# Patient Record
Sex: MALE | Race: BLACK OR AFRICAN AMERICAN | Employment: OTHER | URBAN - METROPOLITAN AREA
[De-identification: names, ages, dates, MRNs, and addresses within clinical notes are randomized per-mention and may not be internally consistent; named-entity substitution may affect disease eponyms.]

---

## 2017-05-30 ENCOUNTER — HOSPITAL ENCOUNTER (OUTPATIENT)
Dept: LAB | Age: 77
Discharge: HOME | End: 2017-05-30
Payer: MEDICARE

## 2017-05-30 ENCOUNTER — OFFICE VISIT (OUTPATIENT)
Dept: INTERNAL MEDICINE CLINIC | Age: 77
End: 2017-05-30

## 2017-05-30 VITALS
HEART RATE: 70 BPM | SYSTOLIC BLOOD PRESSURE: 142 MMHG | DIASTOLIC BLOOD PRESSURE: 70 MMHG | RESPIRATION RATE: 16 BRPM | HEIGHT: 71 IN | WEIGHT: 185.8 LBS | TEMPERATURE: 97.7 F | BODY MASS INDEX: 26.01 KG/M2 | OXYGEN SATURATION: 95 %

## 2017-05-30 DIAGNOSIS — J45.30 MILD PERSISTENT ASTHMA WITHOUT COMPLICATION: Primary | ICD-10-CM

## 2017-05-30 DIAGNOSIS — Z13.31 SCREENING FOR DEPRESSION: ICD-10-CM

## 2017-05-30 DIAGNOSIS — Z13.39 SCREENING FOR ALCOHOLISM: ICD-10-CM

## 2017-05-30 DIAGNOSIS — Z00.00 ROUTINE GENERAL MEDICAL EXAMINATION AT A HEALTH CARE FACILITY: ICD-10-CM

## 2017-05-30 DIAGNOSIS — I10 ESSENTIAL HYPERTENSION: ICD-10-CM

## 2017-05-30 DIAGNOSIS — Z86.010 PERSONAL HISTORY OF COLONIC POLYPS: ICD-10-CM

## 2017-05-30 DIAGNOSIS — R73.01 IFG (IMPAIRED FASTING GLUCOSE): ICD-10-CM

## 2017-05-30 DIAGNOSIS — E78.5 DYSLIPIDEMIA: ICD-10-CM

## 2017-05-30 PROCEDURE — 83036 HEMOGLOBIN GLYCOSYLATED A1C: CPT

## 2017-05-30 PROCEDURE — 36415 COLL VENOUS BLD VENIPUNCTURE: CPT

## 2017-05-30 PROCEDURE — 80061 LIPID PANEL: CPT

## 2017-05-30 PROCEDURE — 80053 COMPREHEN METABOLIC PANEL: CPT

## 2017-05-30 NOTE — MR AVS SNAPSHOT
Visit Information Date & Time Provider Department Dept. Phone Encounter #  
 5/30/2017  8:30 AM Francisco Leiva MD [facility] [phone] [id number] Follow-up Instructions Return in about 6 months (around 11/30/2017) for bp, chol, ifg, asthma. Upcoming Health Maintenance Date Due  
 MEDICARE YEARLY EXAM 5/26/2017 INFLUENZA AGE 9 TO ADULT 8/1/2017 GLAUCOMA SCREENING Q2Y 8/28/2017 COLONOSCOPY 2/18/2021 DTaP/Tdap/Td series (2 - Td) 5/25/2026 Allergies as of 5/30/2017  Review Complete On: 5/30/2017 By: Yamilka Pereyra LPN Severity Noted Reaction Type Reactions Melon Flavor  07/29/2010    Swelling Facial swelling with watermelon and cantelope Current Immunizations  Reviewed on 5/30/2017 Name Date Influenza High Dose Vaccine PF 11/28/2016, 11/23/2015, 11/10/2014 Influenza Vaccine 11/8/2013 Influenza Vaccine Split 11/9/2012  9:13 AM, 10/7/2011 Influenza Vaccine Whole 10/13/2010 Pneumococcal Conjugate (PCV-13) 3/9/2015 Pneumococcal Vaccine (Unspecified Type) 3/9/2015, 1/1/2002, 1/1/1999 TD Vaccine 1/1/1999 Reviewed by Francisco Leiva MD on 5/30/2017 at  8:38 AM  
You Were Diagnosed With   
  
 Codes Comments Mild persistent asthma without complication    -  Primary ICD-10-CM: J45.30 ICD-9-CM: 493.90 Personal history of colonic polyps     ICD-10-CM: Z86.010 
ICD-9-CM: V12.72 Essential hypertension     ICD-10-CM: I10 
ICD-9-CM: 401.9 IFG (impaired fasting glucose)     ICD-10-CM: R73.01 
ICD-9-CM: 790.21 Dyslipidemia     ICD-10-CM: E78.5 ICD-9-CM: 272.4 Routine general medical examination at a health care facility     ICD-10-CM: Z00.00 ICD-9-CM: V70.0 Screening for alcoholism     ICD-10-CM: Z13.89 ICD-9-CM: V79.1 Screening for depression     ICD-10-CM: Z13.89 ICD-9-CM: V79.0 Vitals BP Pulse Temp Resp Height(growth percentile) Weight(growth percentile) 142/70 70 97.7 °F (36.5 °C) (Oral) 16 5' 11\" (1.803 m) 185 lb 12.8 oz (84.3 kg) SpO2 BMI Smoking Status 95% 25.91 kg/m2 Former Smoker Vitals History BMI and BSA Data Body Mass Index Body Surface Area  
 25.91 kg/m 2 2.05 m 2 Preferred Pharmacy Pharmacy Name Phone [facility] [phone] Your Updated Medication List  
  
   
This list is accurate as of: 5/30/17  8:56 AM.  Always use your most recent med list.  
  
  
  
  
 ASMANEX TWISTHALER 220 mcg (60 doses) inhaler Generic drug:  mometasone Inhale 2 puffs by mouth  every day  
  
 finasteride 5 mg tablet Commonly known as:  PROSCAR Take 5 mg by mouth daily. MULTIVITAMIN PO Take  by mouth daily. pantoprazole 40 mg tablet Commonly known as:  PROTONIX Take 1 Tab by mouth daily. psyllium Powd Commonly known as:  METAMUCIL Take  by mouth daily. tamsulosin 0.4 mg capsule Commonly known as:  FLOMAX Take 0.4 mg by mouth nightly. VITAMIN D3 1,000 unit tablet Generic drug:  cholecalciferol Take  by mouth daily. ZyrTEC 10 mg Cap Generic drug:  Cetirizine Take 10 mg by mouth daily. We Performed the Following [facility] 68 [[id number] [facility]] HEMOGLOBIN A1C WITH EAG [63130 CPT(R)] LIPID PANEL [28217 CPT(R)] METABOLIC PANEL, COMPREHENSIVE [30873 CPT(R)] Follow-up Instructions Return in about 6 months (around 11/30/2017) for bp, chol, ifg, asthma. Patient Instructions Medicare Wellness Visit, Male The best way to live healthy is to have a healthy lifestyle by eating a well-balanced diet, exercising regularly, limiting alcohol and stopping smoking. Regular physical exams and screening tests are another way to keep healthy.   
Preventive exams provided by your health care provider can find health problems before they become diseases or illnesses. Preventive services including immunizations, screening tests, monitoring and exams can help you take care of your own health. All people over age 72 should have a pneumovax  and and a prevnar shot to prevent pneumonia. These are once in a lifetime unless you and your provider decide differently. All people over 65 should have a yearly flu shot and a tetanus vaccine every 10 years. Screening for diabetes mellitus with a blood sugar test should be done every year. Glaucoma is a disease of the eye due to increased ocular pressure that can lead to blindness and it should be done every year by an eye professional. 
 
Cardiovascular screening tests that check for elevated lipids (fatty part of blood) which can lead to heart disease and strokes should be done every 5 years. Colorectal screening that evaluates for blood or polyps in your colon should be done yearly as a stool test or every five years as a flexible sigmoidoscope or every 10 years as a colonoscopy up to age 76. Men up to age 76 may need a screening blood test for prostate cancer at certain intervals, depending on their personal and family history. This decision is between the patient and his provider. If you have been a smoker or had family history of abdominal aortic aneurysms, you and your provider may decide to schedule an ultrasound test of your aorta. Hepatitis C screening is also recommended for anyone born between [birth year] through [address]. A shingles vaccine is also recommended once in a lifetime after age 61. Your Medicare Wellness Exam is recommended annually. Here is a list of your current Health Maintenance items with a due date: 
Health Maintenance Due Topic Date Due  
 Annual Well Visit  05/26/2017 Introducing [facility]!    
 Francisco Caceres introduces [employer] patient portal. Now you can access parts of your medical record, email your doctor's office, and request medication refills online. 1. In your internet browser, go to [email or web address] 2. Click on the First Time User? Click Here link in the Sign In box. You will see the New Member Sign Up page. 3. Enter your [employer] Access Code exactly as it appears below. You will not need to use this code after youve completed the sign-up process. If you do not sign up before the expiration date, you must request a new code. · [employer] Access Code: [email or web address] Expires: 8/28/2017  8:56 AM 
 
4. Enter the last four digits of your Social Security Number (xxxx) and Date of Birth (mm/dd/yyyy) as indicated and click Submit. You will be taken to the next sign-up page. 5. Create a [employer] ID. This will be your [employer] login ID and cannot be changed, so think of one that is secure and easy to remember. 6. Create a [employer] password. You can change your password at any time. 7. Enter your Password Reset Question and Answer. This can be used at a later time if you forget your password. 8. Enter your e-mail address. You will receive e-mail notification when new information is available in [address] 9. Click Sign Up. You can now view and download portions of your medical record. 10. Click the Download Summary menu link to download a portable copy of your medical information. If you have questions, please visit the Frequently Asked Questions section of the [employer] website. Remember, [employer] is NOT to be used for urgent needs. For medical emergencies, dial 911. Now available from your iPhone and Android! Please provide this summary of care documentation to your next provider. Your primary care clinician is listed as Felice Manning. If you have any questions after today's visit, please call [phone].

## 2017-05-30 NOTE — PROGRESS NOTES
Patient received paperwork for advance directive during previous visit but has not completed at this time     Reviewed record In preparation for visit and have obtained necessary documentation      1. Have you been to the ER, urgent care clinic since your last visit? Hospitalized since your last visit? NO    2. Have you seen or consulted any other health care providers outside of the [address] [place] [address] since your last visit? Include any pap smears or colon screening.  NO

## 2017-05-30 NOTE — PROGRESS NOTES
HPI  Mr. Jovanna See is a 78y.o. year old male, he is seen today for HTN, high cholesterol, IFG, asthma, AWV. Still walking/light jogging about 2 miles 5 days a week. Asthma overall well controlled, now only uses albuterol rarely, and asmanex consistently if he has more cough/wheezing - not needing now. No cp or sob, gerd pretty well controlled with protonix, no dyspepsia or abdominal pain. +frequent belching and h/o hiatal hernia. No dizziness, lightheadedness, no melena or brbpr. No edema. BPH controlled with proscar and flomax - will see urology next month. Doesn't check BP much. Has occasional right sided neck pain if he moves his head - worries about carotid artery disease as his friend recently had surgery. Chief Complaint   Patient presents with    Annual Wellness Visit     Room 2// Fasting     Blood Pressure Check    Cholesterol Problem    Asthma        Prior to Admission medications    Medication Sig Start Date End Date Taking? Authorizing Provider   finasteride (PROSCAR) 5 mg tablet Take 5 mg by mouth daily. Yes Historical Provider   pantoprazole (PROTONIX) 40 mg tablet Take 1 Tab by mouth daily. 11/28/16  Yes Cherise Gaxiola MD   cholecalciferol (VITAMIN D3) 1,000 unit tablet Take  by mouth daily. Yes Historical Provider   tamsulosin (FLOMAX) 0.4 mg capsule Take 0.4 mg by mouth nightly. Yes Historical Provider   psyllium (METAMUCIL) powd Take  by mouth daily. Yes Historical Provider   [facility] TWISTHALER 220 mcg (60 doses) inhaler Inhale 2 puffs by mouth  every day  Patient taking differently: Inhale 2 puffs by mouth  every day prn 10/19/15  Yes Cherise Gaxiola MD   Cetirizine (ZYRTEC) 10 mg cap Take 10 mg by mouth daily. Yes Historical Provider   MULTIVITAMINS (MULTIVITAMIN PO) Take  by mouth daily.  7/22/10  Yes Historical Provider         Allergies   Allergen Reactions    Melon Flavor Swelling     Facial swelling with watermelon and cantelope         REVIEW OF SYSTEMS:  Per HPI    PHYSICAL EXAM:  Visit Vitals    /70    Pulse 70    Temp 97.7 °F (36.5 °C) (Oral)    Resp 16    Ht 5' 11\" (1.803 m)    Wt 185 lb 12.8 oz (84.3 kg)    SpO2 95%    BMI 25.91 kg/m2     Constitutional: Appears well-developed and well-nourished. No distress. HENT:   Head: Normocephalic and atraumatic. Eyes: No scleral icterus. Cardiovascular: Normal S1/S2, regular rhythm. No murmurs, rubs, or gallops. Pulmonary/Chest: Effort normal and breath sounds normal. No respiratory distress. No wheezes, rhonchi, or rales. Ext: No edema. Neurological: Alert. Psychiatric: Normal mood and affect. Behavior is normal.     Lab Results   Component Value Date/Time    Sodium 141 11/28/2016 08:57 AM    Potassium 4.5 11/28/2016 08:57 AM    Chloride 100 11/28/2016 08:57 AM    CO2 27 11/28/2016 08:57 AM    Anion gap 8 10/24/2015 09:05 PM    Glucose 100 11/28/2016 08:57 AM    BUN 13 11/28/2016 08:57 AM    Creatinine 0.88 11/28/2016 08:57 AM    BUN/Creatinine ratio 15 11/28/2016 08:57 AM    GFR est AA 98 11/28/2016 08:57 AM    GFR est non-AA 85 11/28/2016 08:57 AM    Calcium 9.2 11/28/2016 08:57 AM    Bilirubin, total 0.4 11/28/2016 08:57 AM    AST (SGOT) 21 11/28/2016 08:57 AM    Alk.  phosphatase 53 11/28/2016 08:57 AM    Protein, total 6.5 11/28/2016 08:57 AM    Albumin 4.5 11/28/2016 08:57 AM    Globulin 2.6 10/24/2015 09:05 PM    A-G Ratio 2.3 11/28/2016 08:57 AM    ALT (SGPT) 21 11/28/2016 08:57 AM     Lab Results   Component Value Date/Time    Hemoglobin A1c 6.1 11/28/2016 08:57 AM    Hemoglobin A1c 6.0 05/25/2016 09:05 AM    Hemoglobin A1c 6.0 11/12/2015 01:34 PM      Lab Results   Component Value Date/Time    Cholesterol, total 186 11/28/2016 08:57 AM    HDL Cholesterol 53 11/28/2016 08:57 AM    LDL, calculated 114 11/28/2016 08:57 AM    VLDL, calculated 19 11/28/2016 08:57 AM    Triglyceride 94 11/28/2016 08:57 AM    CHOL/HDL Ratio 3.5 07/29/2010 09:06 AM ASSESSMENT/PLAN  Earl House was seen today for annual wellness visit, blood pressure check, cholesterol problem and asthma. Diagnoses and all orders for this visit:    Mild persistent asthma without complication  Controlled - continue current medications - only uses asmanex certain times of the year  Personal history of colonic polyps  Up to date on colonoscopy  Essential hypertension  Off medications - controlled for age - normally lower at home  IFG (impaired fasting glucose)  -     HEMOGLOBIN A1C WITH EAG  Encouraged continued walking  Dyslipidemia  -     LIPID PANEL  -     METABOLIC PANEL, COMPREHENSIVE  On statin - check labs  Routine general medical examination at a health care facility    Screening for alcoholism    Screening for depression  -     Depression Screen Annual          There are no preventive care reminders to display for this patient. Follow-up Disposition:  Return in about 6 months (around 11/30/2017) for bp, chol, ifg, asthma. Reviewed plan of care. Patient has provided input and agrees with goals. The nurse provided the patient and/or family with advanced directive information if needed and encouraged the patient to provide a copy to the office when available. This is a Subsequent Medicare Annual Wellness Visit providing Personalized Prevention Plan Services (PPPS) (Performed 12 months after initial AWV and PPPS )    I have reviewed the patient's medical history in detail and updated the computerized patient record.      History     Past Medical History:   Diagnosis Date    Asthma     BPH     Elevated PSA     Peyronie disease       Past Surgical History:   Procedure Laterality Date    ABDOMEN SURGERY PROC UNLISTED      R inguinal hernia repair    HX CYST REMOVAL      cyst removal back    HX GI      colon polyp 1998; colonoscopy - needs every 5 yrs - 3/09 (-); repeat 3/2014 - Dr. Ange TANG ear surgery - tympanomastoidectomy (revision 1st surgery 1973 fo \"hole in TM\"; s\p choleostoma; b/l cataract surgery - [facility] at [facility]      Current Outpatient Prescriptions   Medication Sig Dispense Refill    finasteride (PROSCAR) 5 mg tablet Take 5 mg by mouth daily.  pantoprazole (PROTONIX) 40 mg tablet Take 1 Tab by mouth daily. 90 Tab 4    cholecalciferol (VITAMIN D3) 1,000 unit tablet Take  by mouth daily.  tamsulosin (FLOMAX) 0.4 mg capsule Take 0.4 mg by mouth nightly.  psyllium (METAMUCIL) powd Take  by mouth daily.  ASMANEX TWISTHALER 220 mcg (60 doses) inhaler Inhale 2 puffs by mouth  every day (Patient taking differently: Inhale 2 puffs by mouth  every day prn) 3 Inhaler 4    Cetirizine (ZYRTEC) 10 mg cap Take 10 mg by mouth daily.  MULTIVITAMINS (MULTIVITAMIN PO) Take  by mouth daily.        Allergies   Allergen Reactions    Melon Flavor Swelling     Facial swelling with watermelon and cantelope     Family History   Problem Relation Age of Onset    Cancer Mother      gall baldder    Kidney Disease Sister      Social History   Substance Use Topics    Smoking status: Former Smoker     Quit date: 1/1/1970    Smokeless tobacco: Never Used      Comment: started age [age] - <1ppd stopped 1970    Alcohol use Yes     Patient Active Problem List   Diagnosis Code    GERD (gastroesophageal reflux disease) K21.9    BPH (benign prostatic hyperplasia) N40.0    Asthma J45.909    Dyslipidemia E78.5    Personal history of colonic polyps Z86.010    Degenerative arthritis of lumbar spine M47.816    H. 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 No    Do you average more than 14 drinks per week? No      Functional Ability and Level of Safety:     Hearing Loss   mild    Activities of Daily Living   Self-care. Requires assistance with: no ADLs    Fall Risk     Fall Risk Assessment, last 12 mths 5/30/2017   Able to walk? Yes   Fall in past 12 months? No   Fall with injury? -     Abuse Screen   Patient is not abused    Review of Systems   Pertinent items are noted in HPI. Physical Examination     Evaluation of Cognitive Function:  Mood/affect:  happy  Appearance: age appropriate  Family member/caregiver input: n/a        Patient Care Team:  Mark Mahmood MD as PCP - Gerardo Mendoza MD (Cardiology)  Godwin Wilson MD as Physician (Ophthalmology)    Advice/Referrals/Counseling   Education and counseling provided:  Are appropriate based on today's review and evaluation      Assessment/Plan   Ruben Olvera was seen today for annual wellness visit, blood pressure check, cholesterol problem and asthma. Diagnoses and all orders for this visit:    Mild persistent asthma without complication    Personal history of colonic polyps    Essential hypertension    IFG (impaired fasting glucose)  -     HEMOGLOBIN A1C WITH EAG    Dyslipidemia  -     LIPID PANEL  -     METABOLIC PANEL, COMPREHENSIVE    Routine general medical examination at a health care facility    Screening for alcoholism    Screening for depression  -     Depression Screen Annual      Follow-up Disposition:  Return in about 6 months (around 11/30/2017) for bp, chol, ifg, asthma. Bradley Red

## 2017-05-30 NOTE — PATIENT INSTRUCTIONS

## 2017-05-30 NOTE — ACP (ADVANCE CARE PLANNING)
Advance Care Planning (ACP) Provider Conversation Snapshot    Date of ACP Conversation: 05/30/17  Persons included in Conversation:  patient   Length of ACP Conversation in minutes:  <16 minutes (Non-Billable)    Authorized Decision Maker (if patient is incapable of making informed decisions): This person is:   wife Aisha Colbert          For Patients with Decision Making Capacity:    Values/Goals: Exploration of values, goals, and preferences if recovery is not expected, even with continued medical treatment in the event of:  Imminent death  Severe, permanent brain injury  \"In these circumstances, what matters most to you? \"  Care focused more on comfort or quality of life.     Conversation Outcomes / Follow-Up Plan:   Recommended completion of Advance Directive form after review of ACP materials and conversation with prospective healthcare agent

## 2017-05-31 LAB
ALBUMIN SERPL-MCNC: 4.3 G/DL (ref 3.5–4.8)
ALBUMIN/GLOB SERPL: 2 {RATIO} (ref 1.2–2.2)
ALP SERPL-CCNC: 59 IU/L (ref 39–117)
ALT SERPL-CCNC: 25 IU/L (ref 0–44)
AST SERPL-CCNC: 23 IU/L (ref 0–40)
BILIRUB SERPL-MCNC: 0.4 MG/DL (ref 0–1.2)
BUN SERPL-MCNC: 11 MG/DL (ref 8–27)
BUN/CREAT SERPL: 13 (ref 10–24)
CALCIUM SERPL-MCNC: 9.3 MG/DL (ref 8.6–10.2)
CHLORIDE SERPL-SCNC: 101 MMOL/L (ref 96–106)
CHOLEST SERPL-MCNC: 180 MG/DL (ref 100–199)
CO2 SERPL-SCNC: 29 MMOL/L (ref 18–29)
CREAT SERPL-MCNC: 0.84 MG/DL (ref 0.76–1.27)
EST. AVERAGE GLUCOSE BLD GHB EST-MCNC: 131 MG/DL
GLOBULIN SER CALC-MCNC: 2.2 G/DL (ref 1.5–4.5)
GLUCOSE SERPL-MCNC: 99 MG/DL (ref 65–99)
HBA1C MFR BLD: 6.2 % (ref 4.8–5.6)
HDLC SERPL-MCNC: 52 MG/DL
INTERPRETATION, 910389: NORMAL
LDLC SERPL CALC-MCNC: 107 MG/DL (ref 0–99)
POTASSIUM SERPL-SCNC: 4.5 MMOL/L (ref 3.5–5.2)
PROT SERPL-MCNC: 6.5 G/DL (ref 6–8.5)
SODIUM SERPL-SCNC: 142 MMOL/L (ref 134–144)
TRIGL SERPL-MCNC: 106 MG/DL (ref 0–149)
VLDLC SERPL CALC-MCNC: 21 MG/DL (ref 5–40)

## 2017-11-30 ENCOUNTER — HOSPITAL ENCOUNTER (OUTPATIENT)
Dept: LAB | Age: 77
Discharge: HOME | End: 2017-11-30
Payer: MEDICARE

## 2017-11-30 ENCOUNTER — OFFICE VISIT (OUTPATIENT)
Dept: INTERNAL MEDICINE CLINIC | Age: 77
End: 2017-11-30

## 2017-11-30 VITALS
RESPIRATION RATE: 14 BRPM | HEART RATE: 75 BPM | OXYGEN SATURATION: 96 % | WEIGHT: 179.4 LBS | SYSTOLIC BLOOD PRESSURE: 134 MMHG | TEMPERATURE: 97.9 F | HEIGHT: 71 IN | BODY MASS INDEX: 25.11 KG/M2 | DIASTOLIC BLOOD PRESSURE: 74 MMHG

## 2017-11-30 DIAGNOSIS — M47.816 OSTEOARTHRITIS OF LUMBAR SPINE, UNSPECIFIED SPINAL OSTEOARTHRITIS COMPLICATION STATUS: ICD-10-CM

## 2017-11-30 DIAGNOSIS — Z23 ENCOUNTER FOR IMMUNIZATION: ICD-10-CM

## 2017-11-30 DIAGNOSIS — N40.0 BENIGN PROSTATIC HYPERPLASIA, UNSPECIFIED WHETHER LOWER URINARY TRACT SYMPTOMS PRESENT: ICD-10-CM

## 2017-11-30 DIAGNOSIS — K21.00 GASTROESOPHAGEAL REFLUX DISEASE WITH ESOPHAGITIS: ICD-10-CM

## 2017-11-30 DIAGNOSIS — R73.01 IFG (IMPAIRED FASTING GLUCOSE): ICD-10-CM

## 2017-11-30 DIAGNOSIS — J45.40 MODERATE PERSISTENT ASTHMA WITHOUT COMPLICATION: ICD-10-CM

## 2017-11-30 DIAGNOSIS — T14.8XXA MUSCLE STRAIN: ICD-10-CM

## 2017-11-30 DIAGNOSIS — M54.50 BILATERAL LOW BACK PAIN WITHOUT SCIATICA, UNSPECIFIED CHRONICITY: ICD-10-CM

## 2017-11-30 DIAGNOSIS — I10 ESSENTIAL HYPERTENSION: Primary | ICD-10-CM

## 2017-11-30 LAB
BILIRUB UR QL STRIP: NEGATIVE
GLUCOSE UR-MCNC: NEGATIVE MG/DL
KETONES P FAST UR STRIP-MCNC: NEGATIVE MG/DL
PH UR STRIP: 7.5 [PH] (ref 4.6–8)
PROT UR QL STRIP: NORMAL
SP GR UR STRIP: 1.01 (ref 1–1.03)
UA UROBILINOGEN AMB POC: NORMAL (ref 0.2–1)
URINALYSIS CLARITY POC: CLEAR
URINALYSIS COLOR POC: YELLOW
URINE BLOOD POC: NEGATIVE
URINE LEUKOCYTES POC: NEGATIVE
URINE NITRITES POC: NEGATIVE

## 2017-11-30 PROCEDURE — 80053 COMPREHEN METABOLIC PANEL: CPT

## 2017-11-30 PROCEDURE — 36415 COLL VENOUS BLD VENIPUNCTURE: CPT

## 2017-11-30 PROCEDURE — 80061 LIPID PANEL: CPT

## 2017-11-30 PROCEDURE — 83036 HEMOGLOBIN GLYCOSYLATED A1C: CPT

## 2017-11-30 RX ORDER — ALBUTEROL SULFATE 90 UG/1
1 AEROSOL, METERED RESPIRATORY (INHALATION)
Qty: 3 INHALER | Refills: 4 | Status: SHIPPED | OUTPATIENT
Start: 2017-11-30

## 2017-11-30 RX ORDER — OMEPRAZOLE 40 MG/1
40 CAPSULE, DELAYED RELEASE ORAL DAILY
COMMUNITY
End: 2018-11-14

## 2017-11-30 NOTE — PATIENT INSTRUCTIONS
Vaccine Information Statement    Influenza (Flu) Vaccine (Inactivated or Recombinant): What you need to know    Many Vaccine Information Statements are available in Korean and other languages. See www.immunize.org/vis  Hojas de Información Sobre Vacunas están disponibles en Español y en muchos otros idiomas. Visite www.immunize.org/vis    1. Why get vaccinated? Influenza (flu) is a contagious disease that spreads around the [place] every year, usually between October and May. Flu is caused by influenza viruses, and is spread mainly by coughing, sneezing, and close contact. Anyone can get flu. Flu strikes suddenly and can last several days. Symptoms vary by age, but can include:   fever/chills   sore throat   muscle aches   fatigue   cough   headache    runny or stuffy nose    Flu can also lead to pneumonia and blood infections, and cause diarrhea and seizures in children. If you have a medical condition, such as heart or lung disease, flu can make it worse. Flu is more dangerous for some people. Infants and young children, people 72years of age and older, pregnant women, and people with certain health conditions or a weakened immune system are at greatest risk. Each year thousands of people in the [facility] die from flu, and many more are hospitalized. Flu vaccine can:   keep you from getting flu,   make flu less severe if you do get it, and   keep you from spreading flu to your family and other people. 2. Inactivated and recombinant flu vaccines    A dose of flu vaccine is recommended every flu season. Children 6 months through 6years of age may need two doses during the same flu season. Everyone else needs only one dose each flu season.        Some inactivated flu vaccines contain a very small amount of a mercury-based preservative called thimerosal. Studies have not shown thimerosal in vaccines to be harmful, but flu vaccines that do not contain thimerosal are available. There is no live flu virus in flu shots. They cannot cause the flu. There are many flu viruses, and they are always changing. Each year a new flu vaccine is made to protect against three or four viruses that are likely to cause disease in the upcoming flu season. But even when the vaccine doesnt exactly match these viruses, it may still provide some protection    Flu vaccine cannot prevent:   flu that is caused by a virus not covered by the vaccine, or   illnesses that look like flu but are not. It takes about 2 weeks for protection to develop after vaccination, and protection lasts through the flu season. 3. Some people should not get this vaccine    Tell the person who is giving you the vaccine:     If you have any severe, life-threatening allergies. If you ever had a life-threatening allergic reaction after a dose of flu vaccine, or have a severe allergy to any part of this vaccine, you may be advised not to get vaccinated. Most, but not all, types of flu vaccine contain a small amount of egg protein.  If you ever had Guillain-Barré Syndrome (also called GBS). Some people with a history of GBS should not get this vaccine. This should be discussed with your doctor.  If you are not feeling well. It is usually okay to get flu vaccine when you have a mild illness, but you might be asked to come back when you feel better. 4. Risks of a vaccine reaction    With any medicine, including vaccines, there is a chance of reactions. These are usually mild and go away on their own, but serious reactions are also possible. Most people who get a flu shot do not have any problems with it.      Minor problems following a flu shot include:    soreness, redness, or swelling where the shot was given     hoarseness   sore, red or itchy eyes   cough   fever   aches   headache   itching   fatigue  If these problems occur, they usually begin soon after the shot and last 1 or 2 days. More serious problems following a flu shot can include the following:     There may be a small increased risk of Guillain-Barré Syndrome (GBS) after inactivated flu vaccine. This risk has been estimated at 1 or 2 additional cases per million people vaccinated. This is much lower than the risk of severe complications from flu, which can be prevented by flu vaccine.  Young children who get the flu shot along with pneumococcal vaccine (PCV13) and/or DTaP vaccine at the same time might be slightly more likely to have a seizure caused by fever. Ask your doctor for more information. Tell your doctor if a child who is getting flu vaccine has ever had a seizure. Problems that could happen after any injected vaccine:      People sometimes faint after a medical procedure, including vaccination. Sitting or lying down for about 15 minutes can help prevent fainting, and injuries caused by a fall. Tell your doctor if you feel dizzy, or have vision changes or ringing in the ears.  Some people get severe pain in the shoulder and have difficulty moving the arm where a shot was given. This happens very rarely.  Any medication can cause a severe allergic reaction. Such reactions from a vaccine are very rare, estimated at about 1 in a million doses, and would happen within a few minutes to a few hours after the vaccination. As with any medicine, there is a very remote chance of a vaccine causing a serious injury or death. The safety of vaccines is always being monitored. For more information, visit: www.cdc.gov/vaccinesafety/    5. What if there is a serious reaction? What should I look for?  Look for anything that concerns you, such as signs of a severe allergic reaction, very high fever, or unusual behavior.     Signs of a severe allergic reaction can include hives, swelling of the face and throat, difficulty breathing, a fast heartbeat, dizziness, and weakness - usually within a few minutes to a few hours after the vaccination. What should I do?  If you think it is a severe allergic reaction or other emergency that cant wait, call 9-1-1 and get the person to the nearest hospital. Otherwise, call your doctor.  Reactions should be reported to the Vaccine Adverse Event Reporting System (VAERS). Your doctor should file this report, or you can do it yourself through  the VAERS web site at www.vaers. [facility].gov, or by calling 5-741.379.3821. VAERS does not give medical advice. 6. The National Vaccine Injury Compensation Program    The [facility] Vaccine Injury Compensation Program (VICP) is a federal program that was created to compensate people who may have been injured by certain vaccines. Persons who believe they may have been injured by a vaccine can learn about the program and about filing a claim by calling 4-105.764.2477 or visiting the [employer] website at www.[facility].gov/vaccinecompensation. There is a time limit to file a claim for compensation. 7. How can I learn more?  Ask your healthcare provider. He or she can give you the vaccine package insert or suggest other sources of information.  Call your local or state health department.  Contact the Centers for Disease Control and Prevention (CDC):  - Call 8-902.829.6775 (0-688-YIM-INFO) or  - Visit CDCs website at www.cdc.gov/flu    Vaccine Information Statement   Inactivated Influenza Vaccine   8/7/2015  42 JANAY Gallagher [id number]    Department of Health and Human Services  Centers for Disease Control and Prevention    Office Use Only       Back Strain: Care Instructions  Your Care Instructions    Back strain happens when you overstretch, or pull, a muscle in your back. You may hurt your back in an accident or when you exercise or lift something. Most back pain will get better with rest and time. You can take care of yourself at home to help your back heal.  Follow-up care is a key part of your treatment and safety.  Be sure to make and go to all appointments, and call your doctor if you are having problems. It's also a good idea to know your test results and keep a list of the medicines you take. How can you care for yourself at home? · Try to stay as active as you can, but stop or reduce any activity that causes pain. · Put ice or a cold pack on the sore muscle for 10 to 20 minutes at a time to stop swelling. Try this every 1 to 2 hours for 3 days (when you are awake) or until the swelling goes down. Put a thin cloth between the ice pack and your skin. · After 2 or 3 days, apply a heating pad on low or a warm cloth to your back. Some doctors suggest that you go back and forth between hot and cold treatments. · Take pain medicines exactly as directed. ¨ If the doctor gave you a prescription medicine for pain, take it as prescribed. ¨ If you are not taking a prescription pain medicine, ask your doctor if you can take an over-the-counter medicine. · Try sleeping on your side with a pillow between your legs. Or put a pillow under your knees when you lie on your back. These measures can ease pain in your lower back. · Return to your usual level of activity slowly. When should you call for help? Call 911 anytime you think you may need emergency care. For example, call if:  ? · You are unable to move a leg at all. ?Call your doctor now or seek immediate medical care if:  ? · You have new or worse symptoms in your legs, belly, or buttocks. Symptoms may include:  ¨ Numbness or tingling. ¨ Weakness. ¨ Pain. ? · You lose bladder or bowel control. ? Watch closely for changes in your health, and be sure to contact your doctor if you are not getting better as expected. Where can you learn more? Go to http://sajan-jos.info/. Enter I204 in the search box to learn more about \"Back Strain: Care Instructions. \"  Current as of: March 21, 2017  Content Version: 11.4  © 4103-3592 [employer], [employer].  Care instructions adapted under license by [employer] (which disclaims liability or warranty for this information). If you have questions about a medical condition or this instruction, always ask your healthcare professional. [address] any warranty or liability for your use of this information.

## 2017-11-30 NOTE — PROGRESS NOTES
HPI  Mr. Wesley Phelps is a 78y.o. year old male, he is seen today for follow up HTN, high cholesterol, asthma, IFG. New mid right posterior back pain for the past few weeks. Worse with certain positions. Occasionally takes aleve for pain which helps. No pain with coughing or deep breath. No cp or sob. Can't remember any injury but is very active, yard work, using leaf blower. No n/v/abd pain. Has urinary frequency, not new. Asthma pretty well controlled. Only uses asmanex for spells intermittently when more sob. Has lost 6# with diet changes. Wife had a stroke and has been eating differently - cutting back on carbs. Walks 2 miles 5 days a week. Followed by urology for bph. Chief Complaint   Patient presents with    Blood Pressure Check     Room 1// Fasting     Cholesterol Problem    Back Pain     \"off and on pain in my middle back for a few weeks\"    Urinary Frequency        Prior to Admission medications    Medication Sig Start Date End Date Taking? Authorizing Provider   omeprazole (PRILOSEC) 40 mg capsule Take 40 mg by mouth daily. Yes Historical Provider   albuterol (PROVENTIL HFA, VENTOLIN HFA, PROAIR HFA) 90 mcg/actuation inhaler Take 1 Puff by inhalation every four (4) hours as needed for Wheezing. 11/30/17  Yes Deniz Woodruff MD   finasteride (PROSCAR) 5 mg tablet Take 5 mg by mouth daily. Yes Historical Provider   cholecalciferol (VITAMIN D3) 1,000 unit tablet Take  by mouth daily. Yes Historical Provider   tamsulosin (FLOMAX) 0.4 mg capsule Take 0.4 mg by mouth nightly. Yes Historical Provider   psyllium (METAMUCIL) powd Take  by mouth daily. Yes Historical Provider   [facility] TWISTHALER 220 mcg (60 doses) inhaler Inhale 2 puffs by mouth  every day  Patient taking differently: Inhale 2 puffs by mouth  every day prn 10/19/15  Yes Deniz Woodruff MD   Cetirizine (ZYRTEC) 10 mg cap Take 10 mg by mouth daily.    Yes Historical Provider   MULTIVITAMINS (MULTIVITAMIN PO) Take  by mouth daily. 7/22/10  Yes Historical Provider         Allergies   Allergen Reactions    Melon Flavor Swelling     Facial swelling with watermelon and cantelope         REVIEW OF SYSTEMS:  Per HPI    PHYSICAL EXAM:  Visit Vitals    /74 (BP 1 Location: Right arm, BP Patient Position: Sitting)    Pulse 75    Temp 97.9 °F (36.6 °C) (Oral)    Resp 14    Ht 5' 11\" (1.803 m)    Wt 179 lb 6.4 oz (81.4 kg)    SpO2 96%    BMI 25.02 kg/m2     Constitutional: Appears well-developed and well-nourished. No distress. HENT:   Head: Normocephalic and atraumatic. Eyes: No scleral icterus. Neck: no lad, no tm, supple   Cardiovascular: Normal S1/S2, regular rhythm. No murmurs, rubs, or gallops. Pulmonary/Chest: Effort normal and breath sounds normal. No respiratory distress. No wheezes, rhonchi, or rales. Abdomen: Soft, NT/ND, +BS, no rebound or guarding, no masses, no HSM appreciated. Back: no pain on palpation, FROM without pain, sitting up from lying down on exam table elicits pain right mid back  Ext: No edema. Neurological: Alert. Strength 5/5 b/l UE and LE. Psychiatric: Normal mood and affect. Behavior is normal.     Lab Results   Component Value Date/Time    Sodium 142 05/30/2017 08:57 AM    Potassium 4.5 05/30/2017 08:57 AM    Chloride 101 05/30/2017 08:57 AM    CO2 29 05/30/2017 08:57 AM    Anion gap 8 10/24/2015 09:05 PM    Glucose 99 05/30/2017 08:57 AM    BUN 11 05/30/2017 08:57 AM    Creatinine 0.84 05/30/2017 08:57 AM    BUN/Creatinine ratio 13 05/30/2017 08:57 AM    GFR est  05/30/2017 08:57 AM    GFR est non-AA 86 05/30/2017 08:57 AM    Calcium 9.3 05/30/2017 08:57 AM    Bilirubin, total 0.4 05/30/2017 08:57 AM    AST (SGOT) 23 05/30/2017 08:57 AM    Alk.  phosphatase 59 05/30/2017 08:57 AM    Protein, total 6.5 05/30/2017 08:57 AM    Albumin 4.3 05/30/2017 08:57 AM    Globulin 2.6 10/24/2015 09:05 PM    A-G Ratio 2.0 05/30/2017 08:57 AM    ALT (SGPT) 25 05/30/2017 08:57 AM     Lab Results   Component Value Date/Time    Hemoglobin A1c 6.2 05/30/2017 08:57 AM    Hemoglobin A1c 6.1 11/28/2016 08:57 AM    Hemoglobin A1c 6.0 05/25/2016 09:05 AM      Lab Results   Component Value Date/Time    Cholesterol, total 180 05/30/2017 08:57 AM    HDL Cholesterol 52 05/30/2017 08:57 AM    LDL, calculated 107 05/30/2017 08:57 AM    VLDL, calculated 21 05/30/2017 08:57 AM    Triglyceride 106 05/30/2017 08:57 AM    CHOL/HDL Ratio 3.5 07/29/2010 09:06 AM          ASSESSMENT/PLAN  Diagnoses and all orders for this visit:    1. Essential hypertension  -     LIPID PANEL  -     METABOLIC PANEL, COMPREHENSIVE  Controlled without medications  2. Encounter for immunization  -     Influenza virus vaccine ([address]) 72 years and older ([id number])  -     Administration fee () for Medicare insured patients    3. Bilateral low back pain without sciatica, unspecified chronicity  -     AMB POC URINALYSIS DIP STICK AUTO W/O MICRO  ua wnl  4. IFG (impaired fasting glucose)  -     HEMOGLOBIN A1C WITH EAG  Check labs - should be improved with lower carb diet  5. Moderate persistent asthma without complication  -     albuterol (PROVENTIL HFA, VENTOLIN HFA, PROAIR HFA) 90 mcg/actuation inhaler; Take 1 Puff by inhalation every four (4) hours as needed for Wheezing. Controlled- needs refill inhaler  6. Gastroesophageal reflux disease with esophagitis  Controlled with prilosec  7. Benign prostatic hyperplasia, unspecified whether lower urinary tract symptoms present  Stable, followed by urology  8. Muscle strain  Right side back - heat, lidocaine topical prn   9. Osteoarthritis of lumbar spine, unspecified spinal osteoarthritis complication status  Intermittent low back pain, stable        Health Maintenance Due   Topic Date Due    Influenza Age 5 to Adult  08/01/2017    GLAUCOMA SCREENING Q2Y  08/28/2017        Follow-up Disposition:  Return in about 6 months (around 5/30/2018) for bp, chol, ifg, asthma. Reviewed plan of care. Patient has provided input and agrees with goals. The nurse provided the patient and/or family with advanced directive information if needed and encouraged the patient to provide a copy to the office when available.

## 2017-11-30 NOTE — MR AVS SNAPSHOT
Visit Information Date & Time Provider Department Dept. Phone Encounter #  
 11/30/2017  8:30 AM MD Melissa Baker [phone] [id number] Follow-up Instructions Return in about 6 months (around 5/30/2018) for bp, chol, ifg, asthma. Upcoming Health Maintenance Date Due Influenza Age 5 to Adult 8/1/2017 GLAUCOMA SCREENING Q2Y 8/28/2017 MEDICARE YEARLY EXAM 5/31/2018 COLONOSCOPY 2/18/2021 DTaP/Tdap/Td series (2 - Td) 5/25/2026 Allergies as of 11/30/2017  Review Complete On: 11/30/2017 By: Tacho Magana LPN Severity Noted Reaction Type Reactions Melon Flavor  07/29/2010    Swelling Facial swelling with watermelon and cantelope Current Immunizations  Reviewed on 5/30/2017 Name Date Influenza High Dose Vaccine PF  Incomplete, 11/28/2016, 11/23/2015, 11/10/2014 Influenza Vaccine 11/8/2013 Influenza Vaccine Split 11/9/2012  9:13 AM, 10/7/2011 Influenza Vaccine Whole 10/13/2010 Pneumococcal Conjugate (PCV-13) 3/9/2015 Pneumococcal Vaccine (Unspecified Type) 3/9/2015 TD Vaccine 1/1/1999 ZZZ-RETIRED (DO NOT USE) Pneumococcal Vaccine (Unspecified Type) 1/1/2002, 1/1/1999 Not reviewed this visit You Were Diagnosed With   
  
 Codes Comments Bilateral low back pain without sciatica, unspecified chronicity    -  Primary ICD-10-CM: M54.5 ICD-9-CM: 724.2 Encounter for immunization     ICD-10-CM: G39 ICD-9-CM: V03.89 Essential hypertension     ICD-10-CM: I10 
ICD-9-CM: 401.9 IFG (impaired fasting glucose)     ICD-10-CM: R73.01 
ICD-9-CM: 790.21 Moderate persistent asthma without complication     UWA-49-DS: J45.40 ICD-9-CM: 493.90 Gastroesophageal reflux disease with esophagitis     ICD-10-CM: K21.0 ICD-9-CM: 530.11 Benign prostatic hyperplasia, unspecified whether lower urinary tract symptoms present     ICD-10-CM: N40.0 ICD-9-CM: 600.00 Muscle strain     ICD-10-CM: T14. Rajani [place] ICD-9-CM: 848.9 Osteoarthritis of lumbar spine, unspecified spinal osteoarthritis complication status     YYA-35-LW: M47.816 ICD-9-CM: 721.3 Vitals BP Pulse Temp Resp Height(growth percentile) Weight(growth percentile) 134/74 (BP 1 Location: Right arm, BP Patient Position: Sitting) 75 97.9 °F (36.6 °C) (Oral) 14 5' 11\" (1.803 m) 179 lb 6.4 oz (81.4 kg) SpO2 BMI Smoking Status 96% 25.02 kg/m2 Former Smoker Vitals History BMI and BSA Data Body Mass Index Body Surface Area 25.02 kg/m 2 2.02 m 2 Preferred Pharmacy Pharmacy Name Phone [address], [facility] [phone] Your Updated Medication List  
  
   
This list is accurate as of: 11/30/17  8:53 AM.  Always use your most recent med list.  
  
  
  
  
 albuterol 90 mcg/actuation inhaler Commonly known as:  PROVENTIL HFA, VENTOLIN HFA, PROAIR HFA Take 1 Puff by inhalation every four (4) hours as needed for Wheezing. ASMANEX TWISTHALER 220 mcg (60 doses) inhaler Generic drug:  mometasone Inhale 2 puffs by mouth  every day  
  
 finasteride 5 mg tablet Commonly known as:  PROSCAR Take 5 mg by mouth daily. MULTIVITAMIN PO Take  by mouth daily. omeprazole 40 mg capsule Commonly known as:  PRILOSEC Take 40 mg by mouth daily. psyllium Powd Commonly known as:  METAMUCIL Take  by mouth daily. tamsulosin 0.4 mg capsule Commonly known as:  FLOMAX Take 0.4 mg by mouth nightly. VITAMIN D3 1,000 unit tablet Generic drug:  cholecalciferol Take  by mouth daily. ZyrTEC 10 mg Cap Generic drug:  Cetirizine Take 10 mg by mouth daily. Prescriptions Sent to Pharmacy Refills  
 albuterol (PROVENTIL HFA, VENTOLIN HFA, PROAIR HFA) 90 mcg/actuation inhaler 4  Sig: Take 1 Puff by inhalation every four (4) hours as needed for Wheezing. Class: Normal  
 Pharmacy: [address], [address] [place] [address] #: [phone] Route: Inhalation We Performed the Following ADMIN INFLUENZA VIRUS VAC [ HCPCS] AMB POC URINALYSIS DIP STICK AUTO W/O MICRO [67389 CPT(R)] HEMOGLOBIN A1C WITH EAG [19164 CPT(R)] INFLUENZA VIRUS VACCINE, HIGH DOSE SEASONAL, PRESERVATIVE FREE [02078 CPT(R)] LIPID PANEL [10472 CPT(R)] METABOLIC PANEL, COMPREHENSIVE [68675 CPT(R)] Follow-up Instructions Return in about 6 months (around 5/30/2018) for bp, chol, ifg, asthma. Patient Instructions Vaccine Information Statement Influenza (Flu) Vaccine (Inactivated or Recombinant): What you need to know Many Vaccine Information Statements are available in English and other languages. See www.immunize.org/vis Hojas de Información Sobre Vacunas están disponibles en Español y en muchos otros idiomas. Visite www.immunize.org/vis 1. Why get vaccinated? Influenza (flu) is a contagious disease that spreads around the [facility] every year, usually between October and May. Flu is caused by influenza viruses, and is spread mainly by coughing, sneezing, and close contact. Anyone can get flu. Flu strikes suddenly and can last several days. Symptoms vary by age, but can include: 
 fever/chills  sore throat  muscle aches  fatigue  cough  headache  runny or stuffy nose Flu can also lead to pneumonia and blood infections, and cause diarrhea and seizures in children. If you have a medical condition, such as heart or lung disease, flu can make it worse. Flu is more dangerous for some people. Infants and young children, people 72years of age and older, pregnant women, and people with certain health conditions or a weakened immune system are at greatest risk. Each year thousands of people in the [facility] die from flu, and many more are hospitalized. Flu vaccine can: 
 keep you from getting flu, 
 make flu less severe if you do get it, and 
 keep you from spreading flu to your family and other people. 2. Inactivated and recombinant flu vaccines A dose of flu vaccine is recommended every flu season. Children 6 months through 6years of age may need two doses during the same flu season. Everyone else needs only one dose each flu season. Some inactivated flu vaccines contain a very small amount of a mercury-based preservative called thimerosal. Studies have not shown thimerosal in vaccines to be harmful, but flu vaccines that do not contain thimerosal are available. There is no live flu virus in flu shots. They cannot cause the flu. There are many flu viruses, and they are always changing. Each year a new flu vaccine is made to protect against three or four viruses that are likely to cause disease in the upcoming flu season. But even when the vaccine doesnt exactly match these viruses, it may still provide some protection Flu vaccine cannot prevent: 
 flu that is caused by a virus not covered by the vaccine, or 
 illnesses that look like flu but are not. It takes about 2 weeks for protection to develop after vaccination, and protection lasts through the flu season. 3. Some people should not get this vaccine Tell the person who is giving you the vaccine:  If you have any severe, life-threatening allergies. If you ever had a life-threatening allergic reaction after a dose of flu vaccine, or have a severe allergy to any part of this vaccine, you may be advised not to get vaccinated. Most, but not all, types of flu vaccine contain a small amount of egg protein.  If you ever had Guillain-Barré Syndrome (also called GBS). Some people with a history of GBS should not get this vaccine. This should be discussed with your doctor.  If you are not feeling well. It is usually okay to get flu vaccine when you have a mild illness, but you might be asked to come back when you feel better. 4. Risks of a vaccine reaction With any medicine, including vaccines, there is a chance of reactions. These are usually mild and go away on their own, but serious reactions are also possible. Most people who get a flu shot do not have any problems with it. Minor problems following a flu shot include:  
 soreness, redness, or swelling where the shot was given  hoarseness  sore, red or itchy eyes  cough  fever  aches  headache  itching  fatigue If these problems occur, they usually begin soon after the shot and last 1 or 2 days. More serious problems following a flu shot can include the following:  There may be a small increased risk of Guillain-Barré Syndrome (GBS) after inactivated flu vaccine. This risk has been estimated at 1 or 2 additional cases per million people vaccinated. This is much lower than the risk of severe complications from flu, which can be prevented by flu vaccine.  Young children who get the flu shot along with pneumococcal vaccine (PCV13) and/or DTaP vaccine at the same time might be slightly more likely to have a seizure caused by fever. Ask your doctor for more information. Tell your doctor if a child who is getting flu vaccine has ever had a seizure. Problems that could happen after any injected vaccine:  People sometimes faint after a medical procedure, including vaccination. Sitting or lying down for about 15 minutes can help prevent fainting, and injuries caused by a fall. Tell your doctor if you feel dizzy, or have vision changes or ringing in the ears.  Some people get severe pain in the shoulder and have difficulty moving the arm where a shot was given. This happens very rarely.  Any medication can cause a severe allergic reaction.  Such reactions from a vaccine are very rare, estimated at about 1 in a million doses, and would happen within a few minutes to a few hours after the vaccination. As with any medicine, there is a very remote chance of a vaccine causing a serious injury or death. The safety of vaccines is always being monitored. For more information, visit: www.cdc.gov/vaccinesafety/ 
 
5. What if there is a serious reaction? What should I look for?  Look for anything that concerns you, such as signs of a severe allergic reaction, very high fever, or unusual behavior. Signs of a severe allergic reaction can include hives, swelling of the face and throat, difficulty breathing, a fast heartbeat, dizziness, and weakness  usually within a few minutes to a few hours after the vaccination. What should I do?  If you think it is a severe allergic reaction or other emergency that cant wait, call 9-1-1 and get the person to the nearest hospital. Otherwise, call your doctor.  Reactions should be reported to the Vaccine Adverse Event Reporting System (VAERS). Your doctor should file this report, or you can do it yourself through  the VAERS web site at www.vaers. [facility].gov, or by calling 5-701.940.3632. VAERS does not give medical advice. 6. The National Vaccine Injury Compensation Program 
 
The [facility] Vaccine Injury Compensation Program (VICP) is a federal program that was created to compensate people who may have been injured by certain vaccines. Persons who believe they may have been injured by a vaccine can learn about the program and about filing a claim by calling 6-631.209.1568 or visiting the [employer] [place] [employer] website at www.[facility].gov/vaccinecompensation. There is a time limit to file a claim for compensation. 7. How can I learn more?  Ask your healthcare provider. He or she can give you the vaccine package insert or suggest other sources of information.  Call your local or state health department.  Contact the Centers for Disease Control and Prevention (CDC): 
- Call 3-518.562.5744 (1-800-CDC-INFO) or 
- Visit CDCs website at www.cdc.gov/flu Vaccine Information Statement Inactivated Influenza Vaccine 8/7/2015 
Lety Rasmussen [id number]-[id number] Department of [facility] and [employer] Centers for Disease Control and Prevention Office Use Only Back Strain: Care Instructions Your Care Instructions Back strain happens when you overstretch, or pull, a muscle in your back. You may hurt your back in an accident or when you exercise or lift something. Most back pain will get better with rest and time. You can take care of yourself at home to help your back heal. 
Follow-up care is a key part of your treatment and safety. Be sure to make and go to all appointments, and call your doctor if you are having problems. It's also a good idea to know your test results and keep a list of the medicines you take. How can you care for yourself at home? · Try to stay as active as you can, but stop or reduce any activity that causes pain. · Put ice or a cold pack on the sore muscle for 10 to 20 minutes at a time to stop swelling. Try this every 1 to 2 hours for 3 days (when you are awake) or until the swelling goes down. Put a thin cloth between the ice pack and your skin. · After 2 or 3 days, apply a heating pad on low or a warm cloth to your back. Some doctors suggest that you go back and forth between hot and cold treatments. · Take pain medicines exactly as directed. ¨ If the doctor gave you a prescription medicine for pain, take it as prescribed. ¨ If you are not taking a prescription pain medicine, ask your doctor if you can take an over-the-counter medicine. · Try sleeping on your side with a pillow between your legs. Or put a pillow under your knees when you lie on your back. These measures can ease pain in your lower back. · Return to your usual level of activity slowly. When should you call for help? Call 911 anytime you think you may need emergency care. For example, call if: 
? · You are unable to move a leg at all. ?Call your doctor now or seek immediate medical care if: 
? · You have new or worse symptoms in your legs, belly, or buttocks. Symptoms may include: ¨ Numbness or tingling. ¨ Weakness. ¨ Pain. ? · You lose bladder or bowel control. ? Watch closely for changes in your health, and be sure to contact your doctor if you are not getting better as expected. Where can you learn more? Go to http://sajan-jos.info/. Enter U627 in the search box to learn more about \"Back Strain: Care Instructions. \" Current as of: March 21, 2017 Content Version: 11.4 © 1753-9864 [employer]. Care instructions adapted under license by [employer] (which disclaims liability or warranty for this information). If you have questions about a medical condition or this instruction, always ask your healthcare professional. [place] [address] any warranty or liability for your use of this information. Introducing [facility]! Angelina Dave introduces The [employer] patient portal. Now you can access parts of your medical record, email your doctor's office, and request medication refills online. 1. In your internet browser, go to [email or web address] 2. Click on the First Time User? Click Here link in the Sign In box. You will see the New Member Sign Up page. 3. Enter your The [employer] Access Code exactly as it appears below. You will not need to use this code after youve completed the sign-up process. If you do not sign up before the expiration date, you must request a new code. · The [employer]ator Access Code: [email or web address] Expires: 2/28/2018  8:50 AM 
 
4. Enter the last four digits of your Social Security Number (xxxx) and Date of Birth (mm/dd/yyyy) as indicated and click Submit.  You will be taken to the next sign-up page. 5. Create a [employer] ID. This will be your [employer] login ID and cannot be changed, so think of one that is secure and easy to remember. 6. Create a [employer] password. You can change your password at any time. 7. Enter your Password Reset Question and Answer. This can be used at a later time if you forget your password. 8. Enter your e-mail address. You will receive e-mail notification when new information is available in [address]. 9. Click Sign Up. You can now view and download portions of your medical record. 10. Click the Download Summary menu link to download a portable copy of your medical information. If you have questions, please visit the Frequently Asked Questions section of the [employer] website. Remember, [employer] is NOT to be used for urgent needs. For medical emergencies, dial 911. Now available from your iPhone and Android! Please provide this summary of care documentation to your next provider. Your primary care clinician is listed as Felice Manning. If you have any questions after today's visit, please call [phone].

## 2017-12-01 LAB
ALBUMIN SERPL-MCNC: 4.7 G/DL (ref 3.5–4.8)
ALBUMIN/GLOB SERPL: 2.4 {RATIO} (ref 1.2–2.2)
ALP SERPL-CCNC: 58 IU/L (ref 39–117)
ALT SERPL-CCNC: 26 IU/L (ref 0–44)
AST SERPL-CCNC: 23 IU/L (ref 0–40)
BILIRUB SERPL-MCNC: 0.6 MG/DL (ref 0–1.2)
BUN SERPL-MCNC: 16 MG/DL (ref 8–27)
BUN/CREAT SERPL: 18 (ref 10–24)
CALCIUM SERPL-MCNC: 9.3 MG/DL (ref 8.6–10.2)
CHLORIDE SERPL-SCNC: 99 MMOL/L (ref 96–106)
CHOLEST SERPL-MCNC: 173 MG/DL (ref 100–199)
CO2 SERPL-SCNC: 30 MMOL/L (ref 18–29)
CREAT SERPL-MCNC: 0.87 MG/DL (ref 0.76–1.27)
EST. AVERAGE GLUCOSE BLD GHB EST-MCNC: 120 MG/DL
GFR SERPLBLD CREATININE-BSD FMLA CKD-EPI: 84 ML/MIN/1.73
GFR SERPLBLD CREATININE-BSD FMLA CKD-EPI: 98 ML/MIN/1.73
GLOBULIN SER CALC-MCNC: 2 G/DL (ref 1.5–4.5)
GLUCOSE SERPL-MCNC: 97 MG/DL (ref 65–99)
HBA1C MFR BLD: 5.8 % (ref 4.8–5.6)
HDLC SERPL-MCNC: 51 MG/DL
INTERPRETATION, 910389: NORMAL
LDLC SERPL CALC-MCNC: 105 MG/DL (ref 0–99)
POTASSIUM SERPL-SCNC: 4.4 MMOL/L (ref 3.5–5.2)
PROT SERPL-MCNC: 6.7 G/DL (ref 6–8.5)
SODIUM SERPL-SCNC: 141 MMOL/L (ref 134–144)
TRIGL SERPL-MCNC: 86 MG/DL (ref 0–149)
VLDLC SERPL CALC-MCNC: 17 MG/DL (ref 5–40)

## 2017-12-19 ENCOUNTER — TELEPHONE (OUTPATIENT)
Dept: INTERNAL MEDICINE CLINIC | Age: 77
End: 2017-12-19

## 2017-12-19 NOTE — TELEPHONE ENCOUNTER
Pt states received his lab results, some things were higher than he'd like,would like return call from nurse to further discuss   May be reached at [phone]

## 2018-01-05 ENCOUNTER — OFFICE VISIT (OUTPATIENT)
Dept: INTERNAL MEDICINE CLINIC | Age: 78
End: 2018-01-05

## 2018-01-05 VITALS
TEMPERATURE: 97.8 F | WEIGHT: 180.8 LBS | HEART RATE: 90 BPM | BODY MASS INDEX: 25.31 KG/M2 | OXYGEN SATURATION: 97 % | DIASTOLIC BLOOD PRESSURE: 70 MMHG | SYSTOLIC BLOOD PRESSURE: 130 MMHG | HEIGHT: 71 IN | RESPIRATION RATE: 14 BRPM

## 2018-01-05 DIAGNOSIS — M54.12 ACUTE CERVICAL RADICULOPATHY: Primary | ICD-10-CM

## 2018-01-05 DIAGNOSIS — I10 ESSENTIAL HYPERTENSION: ICD-10-CM

## 2018-01-05 RX ORDER — METHYLPREDNISOLONE 4 MG/1
TABLET ORAL
Qty: 1 DOSE PACK | Refills: 0 | Status: SHIPPED | OUTPATIENT
Start: 2018-01-05 | End: 2018-01-11

## 2018-01-05 RX ORDER — PANTOPRAZOLE SODIUM 40 MG/1
TABLET, DELAYED RELEASE ORAL
COMMUNITY
Start: 2017-11-16 | End: 2018-01-05 | Stop reason: ALTCHOICE

## 2018-01-05 NOTE — PROGRESS NOTES
HPI  Mr. Dipesh Espinoza is a 78y.o. year old male, he is seen today for HTN. Complains of right shoulder pain starting yesterday, aching, followed by tingling in fingers 2-3 right hand, feels like aching pain in shoulder and fingers. No weakness in arm, no neck pain. No known injury. Took 2 aleve for pain yesterday which helped some. No chest pain or sob. Checked BP yesterday and was normal when he had aching. This morning BP was 162/83. Chief Complaint   Patient presents with    Blood Pressure Check     Room 1// Pt reports high BP this am and tingling in 2 fingers on R hand // NON fasting     Shoulder Pain     \"bursitis\"         Prior to Admission medications    Medication Sig Start Date End Date Taking? Authorizing Provider   methylPREDNISolone (MEDROL DOSEPACK) 4 mg tablet use as directed 1/5/18 1/11/18 Yes Yazmin Rivas MD   omeprazole (PRILOSEC) 40 mg capsule Take 40 mg by mouth daily. Yes Historical Provider   albuterol (PROVENTIL HFA, VENTOLIN HFA, PROAIR HFA) 90 mcg/actuation inhaler Take 1 Puff by inhalation every four (4) hours as needed for Wheezing. 11/30/17  Yes Yazmin Rivas MD   finasteride (PROSCAR) 5 mg tablet Take 5 mg by mouth daily. Yes Historical Provider   cholecalciferol (VITAMIN D3) 1,000 unit tablet Take  by mouth daily. Yes Historical Provider   tamsulosin (FLOMAX) 0.4 mg capsule Take 0.4 mg by mouth nightly. Yes Historical Provider   psyllium (METAMUCIL) powd Take  by mouth daily. Yes Historical Provider   [facility] TWISTHALER 220 mcg (60 doses) inhaler Inhale 2 puffs by mouth  every day  Patient taking differently: Inhale 2 puffs by mouth  every day prn 10/19/15  Yes Yazmin Rivas MD   Cetirizine (ZYRTEC) 10 mg cap Take 10 mg by mouth daily. Yes Historical Provider   MULTIVITAMINS (MULTIVITAMIN PO) Take  by mouth daily.  7/22/10  Yes Historical Provider         Allergies   Allergen Reactions    Melon Flavor Swelling     Facial swelling with watermelon and vicenta         REVIEW OF SYSTEMS:  Per HPI    PHYSICAL EXAM:  Visit Vitals    /70    Pulse 90    Temp 97.8 °F (36.6 °C) (Oral)    Resp 14    Ht 5' 11\" (1.803 m)    Wt 180 lb 12.8 oz (82 kg)    SpO2 97%    BMI 25.22 kg/m2     Constitutional: Appears well-developed and well-nourished. No distress. HENT:   Head: Normocephalic and atraumatic. Eyes: No scleral icterus. Neck: no pain on palpation neck but looking to right and left elicits pain in right shoulder down right arm  Cardiovascular: Normal S1/S2, regular rhythm. No murmurs, rubs, or gallops. Pulmonary/Chest: Effort normal and breath sounds normal. No respiratory distress. No wheezes, rhonchi, or rales. Ext: No edema. 2+ right radial pulse  Neurological: Alert. Strength 5/5 b/l UE, +decreased sensation to light touch finger tips 2-3 right hand  Psychiatric: Normal mood and affect. Behavior is normal.     Lab Results   Component Value Date/Time    Sodium 141 11/30/2017 12:23 PM    Potassium 4.4 11/30/2017 12:23 PM    Chloride 99 11/30/2017 12:23 PM    CO2 30 11/30/2017 12:23 PM    Anion gap 8 10/24/2015 09:05 PM    Glucose 97 11/30/2017 12:23 PM    BUN 16 11/30/2017 12:23 PM    Creatinine 0.87 11/30/2017 12:23 PM    BUN/Creatinine ratio 18 11/30/2017 12:23 PM    GFR est AA 98 11/30/2017 12:23 PM    GFR est non-AA 84 11/30/2017 12:23 PM    Calcium 9.3 11/30/2017 12:23 PM    Bilirubin, total 0.6 11/30/2017 12:23 PM    AST (SGOT) 23 11/30/2017 12:23 PM    Alk.  phosphatase 58 11/30/2017 12:23 PM    Protein, total 6.7 11/30/2017 12:23 PM    Albumin 4.7 11/30/2017 12:23 PM    Globulin 2.6 10/24/2015 09:05 PM    A-G Ratio 2.4 11/30/2017 12:23 PM    ALT (SGPT) 26 11/30/2017 12:23 PM     Lab Results   Component Value Date/Time    Hemoglobin A1c 5.8 11/30/2017 12:23 PM    Hemoglobin A1c 6.2 05/30/2017 08:57 AM    Hemoglobin A1c 6.1 11/28/2016 08:57 AM      Lab Results   Component Value Date/Time    Cholesterol, total 173 11/30/2017 12:23 PM    HDL Cholesterol 51 11/30/2017 12:23 PM    LDL, calculated 105 11/30/2017 12:23 PM    VLDL, calculated 17 11/30/2017 12:23 PM    Triglyceride 86 11/30/2017 12:23 PM    CHOL/HDL Ratio 3.5 07/29/2010 09:06 AM          ASSESSMENT/PLAN  Diagnoses and all orders for this visit:    1. 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 Refer ortho  2. BMI 25.0-25.9,adult  Discussed weight - stable, he is active  3. Essential hypertension  Repeat bp normal - may have been high due to pain, aleve last night        There are no preventive care reminders to display for this patient. Follow-up Disposition:  Return if symptoms worsen or fail to improve. Reviewed plan of care. Patient has provided input and agrees with goals. The nurse provided the patient and/or family with advanced directive information if needed and encouraged the patient to provide a copy to the office when available.

## 2018-01-05 NOTE — PROGRESS NOTES
Lily Cordoba  Identified pt with two pt identifiers(name and ). Chief Complaint   Patient presents with    Blood Pressure Check     Room 1// Pt reports high BP this am and tingling in 2 fingers on R hand        1. Have you been to the ER, urgent care clinic since your last visit? Hospitalized since your last visit? NO    2. Have you seen or consulted any other health care providers outside of the [facility] since your last visit? Include any pap smears or colon screening.  NO      Dr Trang Jacobsen notified of reason for visit and vitals    Patient received paperwork for advance directive during previous visit but has not completed at this time     Reviewed record In preparation for visit, huddled with provider and have obtained necessary documentation      Health Maintenance Due   Topic    GLAUCOMA SCREENING Q2Y        Wt Readings from Last 3 Encounters:   18 180 lb 12.8 oz (82 kg)   17 179 lb 6.4 oz (81.4 kg)   17 185 lb 12.8 oz (84.3 kg)     Temp Readings from Last 3 Encounters:   17 97.9 °F (36.6 °C) (Oral)   17 97.7 °F (36.5 °C) (Oral)   16 97.8 °F (36.6 °C) (Oral)     BP Readings from Last 3 Encounters:   17 134/74   17 142/70   16 118/74     Pulse Readings from Last 3 Encounters:   17 75   17 70   16 75         Learning Assessment:  :     Learning Assessment 11/10/2014   PRIMARY LEARNER Patient   HIGHEST LEVEL OF EDUCATION - PRIMARY LEARNER  GRADUATED HIGH SCHOOL OR GED   BARRIERS PRIMARY LEARNER NONE     NONE   CO-LEARNER CAREGIVER No   CO-LEARNER NAME none   PRIMARY LANGUAGE ENGLISH   LEARNER PREFERENCE PRIMARY DEMONSTRATION   ANSWERED BY patients   RELATIONSHIP SELF       Depression Screening:  :     PHQ over the last two weeks 2017   Little interest or pleasure in doing things Not at all   Feeling down, depressed or hopeless Not at all   Total Score PHQ 2 0       Fall Risk Assessment:  :     Fall Risk Assessment, last 12 mths 5/30/2017   Able to walk? Yes   Fall in past 12 months? No   Fall with injury? -       Abuse Screening:  :     Abuse Screening Questionnaire 11/28/2016 11/10/2014   Do you ever feel afraid of your partner? N N   Are you in a relationship with someone who physically or mentally threatens you? N N   Is it safe for you to go home? Y Y           Patient is accompanied by wife. I have received verbal consent from Dany Trejo to discuss any/all medical information while they are present in the room. Medication reconciliation up to date and corrected with patient at this time.

## 2018-01-05 NOTE — PATIENT INSTRUCTIONS
Pinched Nerve in the Neck: Care Instructions  Your Care Instructions  A pinched nerve in the neck happens when a vertebra or disc in the upper part of your spine is damaged. This damage can happen because of an injury. Or it can just happen with age. The changes caused by the damage may put pressure on a nearby nerve root, pinching it. This causes symptoms such as sharp pain in your neck, shoulder, arm, hand, or back. You may also have tingling or numbness. Sometimes it makes your arm weaker. The symptoms are usually worse when you turn your head or strain your neck. For many people, the symptoms get better over time and finally go away. Early treatment usually includes medicines for pain and swelling. Sometimes physical therapy and special exercises may help. Follow-up care is a key part of your treatment and safety. Be sure to make and go to all appointments, and call your doctor if you are having problems. It's also a good idea to know your test results and keep a list of the medicines you take. How can you care for yourself at home? · Be safe with medicines. Read and follow all instructions on the label. ¨ If the doctor gave you a prescription medicine for pain, take it as prescribed. ¨ If you are not taking a prescription pain medicine, ask your doctor if you can take an over-the-counter medicine. · Try using a heating pad on a low or medium setting for 15 to 20 minutes every 2 or 3 hours. Try a warm shower in place of one session with the heating pad. You can also buy single-use heat wraps that last up to 8 hours. · You can also try an ice pack for 10 to 15 minutes every 2 to 3 hours. There isn't strong evidence that either heat or ice will help. But you can try them to see if they help you. · Don't spend too long in one position. Take short breaks to move around and change positions. · Wear a seat belt and shoulder harness when you are in a car.   · Sleep with a pillow under your head and neck that keeps your neck straight. · If you were given a neck brace (cervical collar) to limit neck motion, wear it as instructed for as many days as your doctor tells you to. Do not wear it longer than you were told to. Wearing a brace for too long can lead to neck stiffness and can weaken the neck muscles. · Follow your doctor's instructions for gentle neck-stretching exercises. · Do not smoke. Smoking can slow healing of your discs. If you need help quitting, talk to your doctor about stop-smoking programs and medicines. These can increase your chances of quitting for good. · Avoid strenuous work or exercise until your doctor says it is okay. When should you call for help? Call 911 anytime you think you may need emergency care. For example, call if:  ? · You are unable to move an arm or a leg at all. ?Call your doctor now or seek immediate medical care if:  ? · You have new or worse symptoms in your arms, legs, chest, belly, or buttocks. Symptoms may include:  ¨ Numbness or tingling. ¨ Weakness. ¨ Pain. ? · You lose bladder or bowel control. ? Watch closely for changes in your health, and be sure to contact your doctor if:  ? · You are not getting better as expected. Where can you learn more? Go to http://sajan-jos.info/. Enter B971 in the search box to learn more about \"Pinched Nerve in the Neck: Care Instructions. \"  Current as of: March 21, 2017  Content Version: 11.4  © 1795-7067 [employer]. Care instructions adapted under license by [employer] (which disclaims liability or warranty for this information). If you have questions about a medical condition or this instruction, always ask your healthcare professional. [address] any warranty or liability for your use of this information.

## 2018-01-12 ENCOUNTER — TELEPHONE (OUTPATIENT)
Dept: INTERNAL MEDICINE CLINIC | Age: 78
End: 2018-01-12

## 2018-01-12 RX ORDER — METHYLPREDNISOLONE 4 MG/1
TABLET ORAL
Qty: 1 DOSE PACK | Refills: 0 | Status: SHIPPED | OUTPATIENT
Start: 2018-01-12 | End: 2018-01-18

## 2018-01-12 NOTE — TELEPHONE ENCOUNTER
----- Message from Rubi Milton LPN sent at 8/23/0791  8:26 AM EST -----  Regarding: FW: Dr. Tracie Dickinson / Telephone      ----- Message -----     From: Denise Murdock     Sent: 1/11/2018  12:43 PM       To: Rubi Milton LPN  Subject: FW: Dr. Tracie Dickinson / Telephone                            ----- Message -----     From: Swati Cope     Sent: 1/11/2018  12:37 PM       To: [facility]  Subject: Dr. Tracie Dickinson / Telephone                            Pt stated he is still having pain from the pinched nereve.  (h)[phone]

## 2018-01-12 NOTE — TELEPHONE ENCOUNTER
Patient states his right arm is still hurting, pain is a 5/10 at minimum and a 8-9/10 at its worst. He reports pain down his right arm into his fingers. While he was taking prednisone the pain was eased, but since completing the prednisone the pain is back. The pain(now) is no worse but no better since visit on 1/5/18. He wants to know if he can have a repeat dose of prednisone or get a referral to ortho. Please advise.

## 2018-01-12 NOTE — TELEPHONE ENCOUNTER
Pt called and stated that he is waiting for a call back from the nurse regarding his nerve pain. Pt can be reached at [phone].

## 2018-05-31 ENCOUNTER — OFFICE VISIT (OUTPATIENT)
Dept: INTERNAL MEDICINE CLINIC | Facility: CLINIC | Age: 78
End: 2018-05-31

## 2018-05-31 ENCOUNTER — HOSPITAL ENCOUNTER (OUTPATIENT)
Dept: LAB | Age: 78
Discharge: HOME | End: 2018-05-31
Payer: MEDICARE

## 2018-05-31 VITALS
RESPIRATION RATE: 16 BRPM | TEMPERATURE: 97.8 F | DIASTOLIC BLOOD PRESSURE: 70 MMHG | WEIGHT: 182.2 LBS | HEART RATE: 66 BPM | HEIGHT: 71 IN | BODY MASS INDEX: 25.51 KG/M2 | SYSTOLIC BLOOD PRESSURE: 116 MMHG | OXYGEN SATURATION: 95 %

## 2018-05-31 DIAGNOSIS — Z13.39 SCREENING FOR ALCOHOLISM: ICD-10-CM

## 2018-05-31 DIAGNOSIS — I10 ESSENTIAL HYPERTENSION: ICD-10-CM

## 2018-05-31 DIAGNOSIS — Z00.00 MEDICARE ANNUAL WELLNESS VISIT, SUBSEQUENT: Primary | ICD-10-CM

## 2018-05-31 DIAGNOSIS — J45.40 MODERATE PERSISTENT ASTHMA WITHOUT COMPLICATION: ICD-10-CM

## 2018-05-31 DIAGNOSIS — Z13.31 SCREENING FOR DEPRESSION: ICD-10-CM

## 2018-05-31 DIAGNOSIS — Z71.89 ADVANCED DIRECTIVES, COUNSELING/DISCUSSION: ICD-10-CM

## 2018-05-31 DIAGNOSIS — K21.00 GASTROESOPHAGEAL REFLUX DISEASE WITH ESOPHAGITIS: ICD-10-CM

## 2018-05-31 DIAGNOSIS — E78.5 DYSLIPIDEMIA: ICD-10-CM

## 2018-05-31 DIAGNOSIS — Z86.010 PERSONAL HISTORY OF COLONIC POLYPS: ICD-10-CM

## 2018-05-31 PROCEDURE — 36415 COLL VENOUS BLD VENIPUNCTURE: CPT

## 2018-05-31 PROCEDURE — 80053 COMPREHEN METABOLIC PANEL: CPT

## 2018-05-31 PROCEDURE — 80061 LIPID PANEL: CPT

## 2018-05-31 NOTE — PROGRESS NOTES
HPI  Mr. Jin Bishop is a 78y.o. year old male, he is seen today for follow up HTN, high cholesterol. Has had some cough with post nasal drip, no wheezing or sob. Seems to be worse in the morning. No heartburn or indigestion. No chest pain, no weakness. Walks 2 miles 5 days a week. Sometimes tired in afternoon, will take a nap and feel better. Gets 6 - 6 1/2 hours at night, sleeps through the night. Hasn't been checking bp lately. No n/v/abd pain. Chief Complaint   Patient presents with    Blood Pressure Check     Room 2B// Fasting // eye exam up to date w/ Dr Tejal Peace Cholesterol Problem    Annual Wellness Visit        Prior to Admission medications    Medication Sig Start Date End Date Taking? Authorizing Provider   omeprazole (PRILOSEC) 40 mg capsule Take 40 mg by mouth daily. Yes Historical Provider   albuterol (PROVENTIL HFA, VENTOLIN HFA, PROAIR HFA) 90 mcg/actuation inhaler Take 1 Puff by inhalation every four (4) hours as needed for Wheezing. 11/30/17  Yes Jazmine Canseco MD   finasteride (PROSCAR) 5 mg tablet Take 5 mg by mouth daily. Yes Historical Provider   cholecalciferol (VITAMIN D3) 1,000 unit tablet Take  by mouth daily. Yes Historical Provider   tamsulosin (FLOMAX) 0.4 mg capsule Take 0.4 mg by mouth nightly. Yes Historical Provider   psyllium (METAMUCIL) powd Take  by mouth daily. Yes Historical Provider   [facility] TWISTHALER 220 mcg (60 doses) inhaler Inhale 2 puffs by mouth  every day  Patient taking differently: Inhale 2 puffs by mouth  every day prn 10/19/15  Yes Jazmine Canseco MD   Cetirizine (ZYRTEC) 10 mg cap Take 10 mg by mouth daily. Yes Historical Provider   MULTIVITAMINS (MULTIVITAMIN PO) Take  by mouth daily.  7/22/10  Yes Historical Provider         Allergies   Allergen Reactions    Melon Flavor Swelling     Facial swelling with watermelon and cantelope         REVIEW OF SYSTEMS:  Per HPI    PHYSICAL EXAM:  Visit Vitals    /70    Pulse 66  Temp 97.8 °F (36.6 °C) (Oral)    Resp 16    Ht 5' 11\" (1.803 m)    Wt 182 lb 3.2 oz (82.6 kg)    SpO2 95%    BMI 25.41 kg/m2     Constitutional: Appears well-developed and well-nourished. No distress. HENT:   Head: Normocephalic and atraumatic. Eyes: No scleral icterus. Cardiovascular: Normal S1/S2, regular rhythm. No murmurs, rubs, or gallops. Pulmonary/Chest: Effort normal and breath sounds normal. No respiratory distress. No wheezes, rhonchi, or rales. Ext: No edema. Neurological: Alert. Psychiatric: Normal mood and affect. Behavior is normal.     Lab Results   Component Value Date/Time    Sodium 141 11/30/2017 12:23 PM    Potassium 4.4 11/30/2017 12:23 PM    Chloride 99 11/30/2017 12:23 PM    CO2 30 (H) 11/30/2017 12:23 PM    Anion gap 8 10/24/2015 09:05 PM    Glucose 97 11/30/2017 12:23 PM    BUN 16 11/30/2017 12:23 PM    Creatinine 0.87 11/30/2017 12:23 PM    BUN/Creatinine ratio 18 11/30/2017 12:23 PM    GFR est AA 98 11/30/2017 12:23 PM    GFR est non-AA 84 11/30/2017 12:23 PM    Calcium 9.3 11/30/2017 12:23 PM    Bilirubin, total 0.6 11/30/2017 12:23 PM    AST (SGOT) 23 11/30/2017 12:23 PM    Alk. phosphatase 58 11/30/2017 12:23 PM    Protein, total 6.7 11/30/2017 12:23 PM    Albumin 4.7 11/30/2017 12:23 PM    Globulin 2.6 10/24/2015 09:05 PM    A-G Ratio 2.4 (H) 11/30/2017 12:23 PM    ALT (SGPT) 26 11/30/2017 12:23 PM     Lab Results   Component Value Date/Time    Hemoglobin A1c 5.8 (H) 11/30/2017 12:23 PM    Hemoglobin A1c 6.2 (H) 05/30/2017 08:57 AM    Hemoglobin A1c 6.1 (H) 11/28/2016 08:57 AM      Lab Results   Component Value Date/Time    Cholesterol, total 173 11/30/2017 12:23 PM    HDL Cholesterol 51 11/30/2017 12:23 PM    LDL, calculated 105 (H) 11/30/2017 12:23 PM    VLDL, calculated 17 11/30/2017 12:23 PM    Triglyceride 86 11/30/2017 12:23 PM    CHOL/HDL Ratio 3.5 07/29/2010 09:06 AM          ASSESSMENT/PLAN  Diagnoses and all orders for this visit:    1.  Medicare annual wellness visit, subsequent    2. Essential hypertension  Controlled on current regimen, continue   3. Dyslipidemia  -     METABOLIC PANEL, COMPREHENSIVE  -     LIPID PANEL  Check lipids  4. Moderate persistent asthma without complication  Controlled on current regimen, continue   5. Personal history of colonic polyps  Colonoscopy utd  6. Gastroesophageal reflux disease with esophagitis  Controlled on current regimen, continue   7. Advanced directives, counseling/discussion    8. Screening for alcoholism  -     Annual  Alcohol Screen 15 min ()    9. Screening for depression  -     Depression Screen Annual          Health Maintenance Due   Topic Date Due    GLAUCOMA SCREENING Q2Y  08/28/2017    MEDICARE YEARLY EXAM  05/31/2018        Follow-up Disposition:  Return in about 6 months (around 11/30/2018) for bp, chol, ifg, asthma. Reviewed plan of care. Patient has provided input and agrees with goals. The nurse provided the patient and/or family with advanced directive information if needed and encouraged the patient to provide a copy to the office when available. This is the Subsequent Medicare Annual Wellness Exam, performed 12 months or more after the Initial AWV or the last Subsequent AWV    I have reviewed the patient's medical history in detail and updated the computerized patient record.      History     Past Medical History:   Diagnosis Date    Asthma     BPH     Elevated PSA     Peyronie disease       Past Surgical History:   Procedure Laterality Date    ABDOMEN SURGERY PROC UNLISTED      R inguinal hernia repair    HX CYST REMOVAL      cyst removal back    HX GI      colon polyp 1998; colonoscopy - needs every 5 yrs - 3/09 (-); repeat 3/2014 - Dr. Cesia Alas HX HEENT      L ear surgery - tympanomastoidectomy (revision 1st surgery 1973 fo \"hole in TM\"; s\p choleostoma; b/l cataract surgery - [facility]      Current Outpatient Prescriptions   Medication Sig Dispense Refill    omeprazole (PRILOSEC) 40 mg capsule Take 40 mg by mouth daily.  albuterol (PROVENTIL HFA, VENTOLIN HFA, PROAIR HFA) 90 mcg/actuation inhaler Take 1 Puff by inhalation every four (4) hours as needed for Wheezing. 3 Inhaler 4    finasteride (PROSCAR) 5 mg tablet Take 5 mg by mouth daily.  cholecalciferol (VITAMIN D3) 1,000 unit tablet Take  by mouth daily.  tamsulosin (FLOMAX) 0.4 mg capsule Take 0.4 mg by mouth nightly.  psyllium (METAMUCIL) powd Take  by mouth daily.  ASMANEX TWISTHALER 220 mcg (60 doses) inhaler Inhale 2 puffs by mouth  every day (Patient taking differently: Inhale 2 puffs by mouth  every day prn) 3 Inhaler 4    Cetirizine (ZYRTEC) 10 mg cap Take 10 mg by mouth daily.  MULTIVITAMINS (MULTIVITAMIN PO) Take  by mouth daily.        Allergies   Allergen Reactions    Melon Flavor Swelling     Facial swelling with watermelon and cantelope     Family History   Problem Relation Age of Onset    Cancer Mother      gall baldder    Kidney Disease Sister      Social History   Substance Use Topics    Smoking status: Former Smoker     Quit date: 1/1/1970    Smokeless tobacco: Never Used      Comment: started age [age] - <1ppd stopped 1970    Alcohol use Yes     Patient Active Problem List   Diagnosis Code    GERD (gastroesophageal reflux disease) K21.9    BPH (benign prostatic hyperplasia) N40.0    Asthma J45.909    Dyslipidemia E78.5    Personal history of colonic polyps Z86.010    Degenerative arthritis of lumbar spine M47.816    H. 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 Functional Ability and Level of Safety:   Hearing Loss  Hearing is good. Activities of Daily Living  The home contains: no safety equipment. Patient does total self care    Fall Risk  Fall Risk Assessment, last 12 mths 5/31/2018   Able to walk? Yes   Fall in past 12 months? No   Fall with injury? -       Abuse Screen  Patient is not abused    Cognitive Screening   Evaluation of Cognitive Function:  Has your family/caregiver stated any concerns about your memory: no  Normal    Patient Care Team   Patient Care Team:  Robbie Rg MD as PCP - Preeti Yo MD (Cardiology)  Neo Vo MD as Physician (Ophthalmology)    Assessment/Plan   Education and counseling provided:  Are appropriate based on today's review and evaluation    Diagnoses and all orders for this visit:    1. Medicare annual wellness visit, subsequent    2. Essential hypertension    3. Dyslipidemia  -     METABOLIC PANEL, COMPREHENSIVE  -     LIPID PANEL    4. Moderate persistent asthma without complication    5. Personal history of colonic polyps    6. Gastroesophageal reflux disease with esophagitis    7. Advanced directives, counseling/discussion    8. Screening for alcoholism  -     Annual  Alcohol Screen 15 min ()    9.  Screening for depression  -     Depression Screen Annual        Health Maintenance Due   Topic Date Due    GLAUCOMA SCREENING Q2Y  08/28/2017

## 2018-05-31 NOTE — PATIENT INSTRUCTIONS
Medicare Wellness Visit, Male    The best way to live healthy is to have a lifestyle where you eat a well-balanced diet, exercise regularly, limit alcohol use, and quit all forms of tobacco/nicotine, if applicable. Regular preventive services are another way to keep healthy. Preventive services (vaccines, screening tests, monitoring & exams) can help personalize your care plan, which helps you manage your own care. Screening tests can find health problems at the earliest stages, when they are easiest to treat. [facility] follows the current, evidence-based guidelines published by the [facility] ([facility]) when recommending preventive services for our patients. Because we follow these guidelines, sometimes recommendations change over time as research supports it. (For example, a prostate screening blood test is no longer routinely recommended for men with no symptoms.)    Of course, you and your provider may decide to screen more often for some diseases, based on your risk and co-morbidities (chronic disease you are already diagnosed with). Preventive services for you include:    - Medicare offers their members a free annual wellness visit, which is time for you and your primary care provider to discuss and plan for your preventive service needs. Take advantage of this benefit every year!    -All people over age 72 should receive the recommended pneumonia vaccines. Current USPSTF guidelines recommend a series of two vaccines for the best pneumonia protection.     -All adults should have a yearly flu vaccine and a tetanus vaccine every 10 years.  All adults age 61 years should receive a shingles vaccine once in their lifetime.      -All adults age 38-68 years who are overweight should have a diabetes screening test once every three years.     -Other screening tests & preventive services for persons with diabetes include: an eye exam to screen for diabetic retinopathy, a kidney function test, a foot exam, and stricter control over your cholesterol.     -Cardiovascular screening for adults with routine risk involves an electrocardiogram (ECG) at intervals determined by the provider.     -Colorectal cancer screenings should be done for adults age 54-65 years with normal risk. There are a number of acceptable methods of screening for this type of cancer. Each test has its own benefits and drawbacks. Discuss with your provider what is most appropriate for you during your annual wellness visit. The different tests include: colonoscopy (considered the best screening method), a fecal occult blood test, a fecal DNA test, and sigmoidoscopy.    -All adults born between [facility] should be screened once for Hepatitis C.    -An Abdominal Aortic Aneurysm (AAA) Screening is recommended for men age 73-68 who has ever smoked in their lifetime.      Here is a list of your current Health Maintenance items (your personalized list of preventive services) with a due date:  Health Maintenance Due   Topic Date Due    Glaucoma Screening   08/28/2017    Annual Well Visit  05/31/2018

## 2018-05-31 NOTE — MR AVS SNAPSHOT
[address] [address] [phone] Patient: Alex Brown MRN: [id number] LJA:[id number] Visit Information Date & Time Provider Department Dept. Phone Encounter #  
 5/31/2018 12:15 PM Leobardo Gutierrez MD [facility] Internal Medicine of [address] [place] [address] [id number] Follow-up Instructions Return in about 6 months (around 11/30/2018) for bp, chol, ifg, asthma. Your Appointments 5/31/2018 12:15 PM  
ROUTINE CARE with Leobardo Gutierrez MD  
[facility] Internal Medicine of [facility]) Appt Note: 6mth f/u; bp, chol, ifg, asthma [address] [phone]  
  
   
 [address] [facility] Upcoming Health Maintenance Date Due  
 GLAUCOMA SCREENING Q2Y 8/28/2017 MEDICARE YEARLY EXAM 5/31/2018 Influenza Age 5 to Adult 8/1/2018 COLONOSCOPY 2/18/2021 DTaP/Tdap/Td series (2 - Td) 5/25/2026 Allergies as of 5/31/2018  Review Complete On: 5/31/2018 By: Leobardo Gutierrez MD  
  
 Severity Noted Reaction Type Reactions Melon Flavor  07/29/2010    Swelling Facial swelling with watermelon and cantelope Current Immunizations  Reviewed on 5/31/2018 Name Date Influenza High Dose Vaccine PF 11/30/2017, 11/28/2016, 11/23/2015, 11/10/2014 Influenza Vaccine 11/8/2013 Influenza Vaccine Split 11/9/2012  9:13 AM, 10/7/2011 Influenza Vaccine Whole 10/13/2010 Pneumococcal Conjugate (PCV-13) 3/9/2015 Pneumococcal Vaccine (Unspecified Type) 3/9/2015 TD Vaccine 1/1/1999 ZZZ-RETIRED (DO NOT USE) Pneumococcal Vaccine (Unspecified Type) 1/1/2002, 1/1/1999 Reviewed by Leobardo Gutierrez MD on 5/31/2018 at  9:30 AM  
You Were Diagnosed With   
  
 Codes Comments Dyslipidemia    -  Primary ICD-10-CM: E78.5 ICD-9-CM: 272.4 Essential hypertension     ICD-10-CM: I10 
ICD-9-CM: 401.9 Moderate persistent asthma without complication     BFL-16-NQ: J45.40 ICD-9-CM: 493.90 Personal history of colonic polyps     ICD-10-CM: Z86.010 
ICD-9-CM: V12.72 Gastroesophageal reflux disease with esophagitis     ICD-10-CM: K21.0 ICD-9-CM: 530.11 Medicare annual wellness visit, subsequent     ICD-10-CM: Z00.00 ICD-9-CM: V70.0 Advanced directives, counseling/discussion     ICD-10-CM: Z71.89 ICD-9-CM: V65.49 Screening for alcoholism     ICD-10-CM: Z13.89 ICD-9-CM: V79.1 Screening for depression     ICD-10-CM: Z13.89 ICD-9-CM: V79.0 Vitals BP Pulse Temp Resp Height(growth percentile) Weight(growth percentile) 116/70 66 97.8 °F (36.6 °C) (Oral) 16 5' 11\" (1.803 m) 182 lb 3.2 oz (82.6 kg) SpO2 BMI Smoking Status 95% 25.41 kg/m2 Former Smoker Vitals History BMI and BSA Data Body Mass Index Body Surface Area  
 25.41 kg/m 2 2.03 m 2 Preferred Pharmacy Pharmacy Name Phone [facility], [facility] [phone] Your Updated Medication List  
  
   
This list is accurate as of 5/31/18  9:40 AM.  Always use your most recent med list.  
  
  
  
  
 albuterol 90 mcg/actuation inhaler Commonly known as:  PROVENTIL HFA, VENTOLIN HFA, PROAIR HFA Take 1 Puff by inhalation every four (4) hours as needed for Wheezing. ASMANEX TWISTHALER 220 mcg (60 doses) inhaler Generic drug:  mometasone Inhale 2 puffs by mouth  every day  
  
 finasteride 5 mg tablet Commonly known as:  PROSCAR Take 5 mg by mouth daily. MULTIVITAMIN PO Take  by mouth daily. omeprazole 40 mg capsule Commonly known as:  PRILOSEC Take 40 mg by mouth daily. psyllium Powd Commonly known as:  METAMUCIL Take  by mouth daily. tamsulosin 0.4 mg capsule Commonly known as:  FLOMAX Take 0.4 mg by mouth nightly. VITAMIN D3 1,000 unit tablet Generic drug:  cholecalciferol Take  by mouth daily. ZyrTEC 10 mg Cap Generic drug:  Cetirizine Take 10 mg by mouth daily. We Performed the Following [address] [[id number] [facility]] LIPID PANEL [62382 CPT(R)] METABOLIC PANEL, COMPREHENSIVE [86496 CPT(R)] MS ANNUAL ALCOHOL SCREEN 15 MIN [id number] [facility]] Follow-up Instructions Return in about 6 months (around 11/30/2018) for bp, chol, ifg, asthma. Patient Instructions Medicare Wellness Visit, Male The best way to live healthy is to have a lifestyle where you eat a well-balanced diet, exercise regularly, limit alcohol use, and quit all forms of tobacco/nicotine, if applicable. Regular preventive services are another way to keep healthy. Preventive services (vaccines, screening tests, monitoring & exams) can help personalize your care plan, which helps you manage your own care. Screening tests can find health problems at the earliest stages, when they are easiest to treat. [facility] follows the current, evidence-based guidelines published by the [facility] Chava Ward ([facility]) when recommending preventive services for our patients. Because we follow these guidelines, sometimes recommendations change over time as research supports it. (For example, a prostate screening blood test is no longer routinely recommended for men with no symptoms.) Of course, you and your provider may decide to screen more often for some diseases, based on your risk and co-morbidities (chronic disease you are already diagnosed with). Preventive services for you include: - Medicare offers their members a free annual wellness visit, which is time for you and your primary care provider to discuss and plan for your preventive service needs. Take advantage of this benefit every year! 
 
-All people over age 72 should receive the recommended pneumonia vaccines. Current USPSTF guidelines recommend a series of two vaccines for the best pneumonia protection.  
 
-All adults should have a yearly flu vaccine and a tetanus vaccine every 10 years. All adults age 61 years should receive a shingles vaccine once in their lifetime.   
 
-All adults age 38-68 years who are overweight should have a diabetes screening test once every three years.  
 
-Other screening tests & preventive services for persons with diabetes include: an eye exam to screen for diabetic retinopathy, a kidney function test, a foot exam, and stricter control over your cholesterol.  
 
-Cardiovascular screening for adults with routine risk involves an electrocardiogram (ECG) at intervals determined by the provider.  
 
-Colorectal cancer screenings should be done for adults age 54-65 years with normal risk. There are a number of acceptable methods of screening for this type of cancer. Each test has its own benefits and drawbacks. Discuss with your provider what is most appropriate for you during your annual wellness visit. The different tests include: colonoscopy (considered the best screening method), a fecal occult blood test, a fecal DNA test, and sigmoidoscopy. 
 
-All adults born between [facility] should be screened once for Hepatitis C. 
 
-An Abdominal Aortic Aneurysm (AAA) Screening is recommended for men age 73-68 who has ever smoked in their lifetime. Here is a list of your current Health Maintenance items (your personalized list of preventive services) with a due date: 
Health Maintenance Due Topic Date Due  Glaucoma Screening   08/28/2017 [facility] Annual Well Visit  05/31/2018 Introducing [address]! [facility] introduces [employer] patient portal. Now you can access parts of your medical record, email your doctor's office, and request medication refills online. 1. In your internet browser, go to [email or web address] 2. Click on the First Time User? Click Here link in the Sign In box. You will see the New Member Sign Up page. 3. Enter your [employer] Access Code exactly as it appears below. You will not need to use this code after youve completed the sign-up process. If you do not sign up before the expiration date, you must request a new code. · [employer] Access Code: [email or web address] Expires: 8/29/2018  9:39 AM 
 
4. Enter the last four digits of your Social Security Number (xxxx) and Date of Birth (mm/dd/yyyy) as indicated and click Submit. You will be taken to the next sign-up page. 5. Create a [employer] ID. This will be your [employer]Post login ID and cannot be changed, so think of one that is secure and easy to remember. 6. Create a [employer] password. You can change your password at any time. 7. Enter your Password Reset Question and Answer. This can be used at a later time if you forget your password. 8. Enter your e-mail address. You will receive e-mail notification when new information is available in [address] 9. Click Sign Up. You can now view and download portions of your medical record. 10. Click the Download Summary menu link to download a portable copy of your medical information. If you have questions, please visit the Frequently Asked Questions section of the [employer] website. Remember, [employer] is NOT to be used for urgent needs. For medical emergencies, dial 911. Now available from your iPhone and Android! Please provide this summary of care documentation to your next provider. Your primary care clinician is listed as Felice Manning. If you have any questions after today's visit, please call [phone].

## 2018-05-31 NOTE — PROGRESS NOTES
Daryl Bourne  Identified pt with two pt identifiers(name and ). Chief Complaint   Patient presents with    Blood Pressure Check     Room 2B// Fasting     Cholesterol Problem       1. Have you been to the ER, urgent care clinic since your last visit? Hospitalized since your last visit? NO    2. Have you seen or consulted any other health care providers outside of the [address] [place] [address] since your last visit? Include any pap smears or colon screening. NO      Provider notified of reason for visit, vitals and flowsheets obtained on patients.      Patient received paperwork for advance directive during previous visit but has not completed at this time     Reviewed record In preparation for visit, huddled with provider and have obtained necessary documentation      Health Maintenance Due   Topic    GLAUCOMA SCREENING Q2Y     MEDICARE YEARLY EXAM        Wt Readings from Last 3 Encounters:   18 182 lb 3.2 oz (82.6 kg)   18 180 lb 12.8 oz (82 kg)   17 179 lb 6.4 oz (81.4 kg)     Temp Readings from Last 3 Encounters:   18 97.8 °F (36.6 °C) (Oral)   17 97.9 °F (36.6 °C) (Oral)   17 97.7 °F (36.5 °C) (Oral)     BP Readings from Last 3 Encounters:   18 130/70   17 134/74   17 142/70     Pulse Readings from Last 3 Encounters:   18 90   17 75   17 70     Vitals:    18 0914   Resp: 16   Weight: 182 lb 3.2 oz (82.6 kg)   Height: 5' 11\" (1.803 m)         Learning Assessment:  :     Learning Assessment 11/10/2014   PRIMARY LEARNER Patient   HIGHEST LEVEL OF EDUCATION - PRIMARY LEARNER  GRADUATED HIGH SCHOOL OR GED   BARRIERS PRIMARY LEARNER NONE     NONE   CO-LEARNER CAREGIVER No   CO-LEARNER NAME none   PRIMARY LANGUAGE ENGLISH   LEARNER PREFERENCE PRIMARY DEMONSTRATION   ANSWERED BY patients   RELATIONSHIP SELF       Depression Screening:  :     PHQ over the last two weeks 2017   Little interest or pleasure in doing things Not at all   Feeling down, depressed or hopeless Not at all   Total Score PHQ 2 0       Fall Risk Assessment:  :     Fall Risk Assessment, last 12 mths 5/30/2017   Able to walk? Yes   Fall in past 12 months? No   Fall with injury? -       Abuse Screening:  :     Abuse Screening Questionnaire 11/28/2016 11/10/2014   Do you ever feel afraid of your partner? N N   Are you in a relationship with someone who physically or mentally threatens you? N N   Is it safe for you to go home? Y Y       ADL Screening:  :     ADL Assessment 5/30/2017   Feeding yourself No Help Needed   Getting from bed to chair No Help Needed   Getting dressed No Help Needed   Bathing or showering No Help Needed   Walk across the room (includes cane/walker) No Help Needed   Using the telphone No Help Needed   Taking your medications No Help Needed   Preparing meals No Help Needed   Managing money (expenses/bills) No Help Needed   Moderately strenuous housework (laundry) No Help Needed   Shopping for personal items (toiletries/medicines) No Help Needed   Shopping for groceries No Help Needed   Driving No Help Needed   Climbing a flight of stairs No Help Needed   Getting to places beyond walking distances No Help Needed         Medication reconciliation up to date and corrected with patient at this time.

## 2018-06-01 LAB
ALBUMIN SERPL-MCNC: 4.4 G/DL (ref 3.5–4.8)
ALBUMIN/GLOB SERPL: 2.4 {RATIO} (ref 1.2–2.2)
ALP SERPL-CCNC: 51 IU/L (ref 39–117)
ALT SERPL-CCNC: 26 IU/L (ref 0–44)
AST SERPL-CCNC: 26 IU/L (ref 0–40)
BILIRUB SERPL-MCNC: 0.4 MG/DL (ref 0–1.2)
BUN SERPL-MCNC: 13 MG/DL (ref 8–27)
BUN/CREAT SERPL: 15 (ref 10–24)
CALCIUM SERPL-MCNC: 9.3 MG/DL (ref 8.6–10.2)
CHLORIDE SERPL-SCNC: 103 MMOL/L (ref 96–106)
CHOLEST SERPL-MCNC: 170 MG/DL (ref 100–199)
CO2 SERPL-SCNC: 28 MMOL/L (ref 18–29)
CREAT SERPL-MCNC: 0.84 MG/DL (ref 0.76–1.27)
GFR SERPLBLD CREATININE-BSD FMLA CKD-EPI: 86 ML/MIN/1.73
GFR SERPLBLD CREATININE-BSD FMLA CKD-EPI: 99 ML/MIN/1.73
GLOBULIN SER CALC-MCNC: 1.8 G/DL (ref 1.5–4.5)
GLUCOSE SERPL-MCNC: 99 MG/DL (ref 65–99)
HDLC SERPL-MCNC: 53 MG/DL
LDLC SERPL CALC-MCNC: 101 MG/DL (ref 0–99)
POTASSIUM SERPL-SCNC: 4.4 MMOL/L (ref 3.5–5.2)
PROT SERPL-MCNC: 6.2 G/DL (ref 6–8.5)
SODIUM SERPL-SCNC: 142 MMOL/L (ref 134–144)
TRIGL SERPL-MCNC: 81 MG/DL (ref 0–149)
VLDLC SERPL CALC-MCNC: 16 MG/DL (ref 5–40)

## 2018-07-30 ENCOUNTER — OFFICE VISIT (OUTPATIENT)
Dept: INTERNAL MEDICINE CLINIC | Facility: CLINIC | Age: 78
End: 2018-07-30

## 2018-07-30 VITALS
BODY MASS INDEX: 25.42 KG/M2 | TEMPERATURE: 98.2 F | RESPIRATION RATE: 18 BRPM | OXYGEN SATURATION: 93 % | SYSTOLIC BLOOD PRESSURE: 138 MMHG | HEART RATE: 83 BPM | HEIGHT: 71 IN | WEIGHT: 181.6 LBS | DIASTOLIC BLOOD PRESSURE: 70 MMHG

## 2018-07-30 DIAGNOSIS — G89.29 CHRONIC BILATERAL THORACIC BACK PAIN: ICD-10-CM

## 2018-07-30 DIAGNOSIS — J01.90 ACUTE NON-RECURRENT SINUSITIS, UNSPECIFIED LOCATION: Primary | ICD-10-CM

## 2018-07-30 DIAGNOSIS — M54.6 CHRONIC BILATERAL THORACIC BACK PAIN: ICD-10-CM

## 2018-07-30 DIAGNOSIS — J30.89 NON-SEASONAL ALLERGIC RHINITIS, UNSPECIFIED TRIGGER: ICD-10-CM

## 2018-07-30 RX ORDER — AMOXICILLIN AND CLAVULANATE POTASSIUM 875; 125 MG/1; MG/1
1 TABLET, FILM COATED ORAL EVERY 12 HOURS
Qty: 20 TAB | Refills: 0 | Status: SHIPPED | OUTPATIENT
Start: 2018-07-30 | End: 2018-08-09

## 2018-07-30 NOTE — PROGRESS NOTES
HPI  Tyshawn Fuller is a 70y.o. year old male patient of Jerrica Mayers MD who presents with c/o cough and hoarseness. Pt has history of has GERD (gastroesophageal reflux disease), BPH (benign prostatic hyperplasia), Asthma, Dyslipidemia, Personal history of colonic polyps, Degenerative arthritis of lumbar spine, H. pylori infection, HTN (hypertension), Allergic rhinosinusitis, Chronic mastoiditis, Dizziness, Advance care planning, and Prediabetes on his problem list..    Pt c/o cough and hoarsemess x 1 month. Coughing up yellow sputum. Denies SOB or wheezing. Has used albuterol three times in the last month. When he is in a conversation his voice will get hoarse and has to clear up throat a lot, states this is new over the last month. C/o persistent nasal congestion, can't smell. Taking zyrtec everyday. Not taking any cold or cough medicine. No coughing at night. No hemoptysis. Former smoker, 1970s quit, was a [employer] smoker. C/o fatigue over the last month. Denies fevers or chills. Denies sore throat. Drinks ETOH 1 drink a month. In the past 1960-1980s was a heavy drinker. No hx of cancer. C/o back aches in upper back, been going on for \"a long time\". Flares up more with movement. No pain with coughing or sitting still. Pain is 5/10, achy, feels like pulled muscle. Not taking anything for pain. Has done some banded stretches at home in past that helped. Pt saw Dr. Ramona Walker two months ago and c/o cough and PND and was continued on his zyrtec, asmanex and albuterol.     Patient Active Problem List   Diagnosis Code    GERD (gastroesophageal reflux disease) K21.9    BPH (benign prostatic hyperplasia) N40.0    Asthma J45.909    Dyslipidemia E78.5    Personal history of colonic polyps Z86.010    Degenerative arthritis of lumbar spine M47.816    H. pylori infection A04.8    HTN (hypertension) I10    Allergic rhinosinusitis J30.9    Chronic mastoiditis H70.10    Dizziness R42    Advance care planning Z71.89    Prediabetes R73.03     Past Medical History:   Diagnosis Date    Asthma     BPH     Elevated PSA     Peyronie disease      Past Surgical History:   Procedure Laterality Date    ABDOMEN SURGERY PROC UNLISTED      R inguinal hernia repair    HX CYST REMOVAL      cyst removal back    HX GI      colon polyp 1998; colonoscopy - needs every 5 yrs - 3/09 (-); repeat 3/2014 - Dr. Sheila Martinez HX HEENT      L ear surgery - tympanomastoidectomy (revision 1st surgery 1973 fo \"hole in TM\"; s\p choleostoma; b/l cataract surgery - followed at [address] [place] [address] Marital status:      Spouse name: N/A    Number of children: N/A    Years of education: N/A     Social History Main Topics    Smoking status: Former Smoker     Quit date: 1/1/1970    Smokeless tobacco: Never Used      Comment: started age [age] - <1ppd stopped 1970    Alcohol use Yes    Drug use: No    Sexual activity: Yes     Partners: Female     Other Topics Concern    None     Social History Narrative     Family History   Problem Relation Age of Onset    Cancer Mother      gall baldder    Kidney Disease Sister      Allergies   Allergen Reactions    Melon Flavor Swelling     Facial swelling with watermelon and cantelope       MEDICATIONS  Current Outpatient Prescriptions   Medication Sig    omeprazole (PRILOSEC) 40 mg capsule Take 40 mg by mouth daily.  albuterol (PROVENTIL HFA, VENTOLIN HFA, PROAIR HFA) 90 mcg/actuation inhaler Take 1 Puff by inhalation every four (4) hours as needed for Wheezing.  finasteride (PROSCAR) 5 mg tablet Take 5 mg by mouth daily.  cholecalciferol (VITAMIN D3) 1,000 unit tablet Take  by mouth daily.  tamsulosin (FLOMAX) 0.4 mg capsule Take 0.4 mg by mouth nightly.  psyllium (METAMUCIL) powd Take  by mouth daily.     ASMANEX TWISTHALER 220 mcg (60 doses) inhaler Inhale 2 puffs by mouth  every day (Patient taking differently: Inhale 2 puffs by mouth every day prn)    Cetirizine (ZYRTEC) 10 mg cap Take 10 mg by mouth daily.  MULTIVITAMINS (MULTIVITAMIN PO) Take  by mouth daily. No current facility-administered medications for this visit. REVIEW OF SYSTEMS  Per HPI        Visit Vitals    /70 (BP 1 Location: Left arm, BP Patient Position: Sitting)    Pulse 83    Temp 98.2 °F (36.8 °C) (Oral)    Resp 18    Ht 5' 11\" (1.803 m)    Wt 181 lb 9.6 oz (82.4 kg)    SpO2 93%    BMI 25.33 kg/m2         General: Well-developed, well-nourished. In no distress. A&O x 3. Head: Normocephalic, atraumatic. Eyes: Conjunctiva clear. Ears/Nose: TM's and ear canals normal bilaterally. Nares normal. Septum midline. Normal nasal mucosa. No drainage or sinus tenderness. Mouth/Throat: Lips, mucosa, and tongue normal. Oropharynx erythematous consistent with PND. Voice with mild hoarseness. Neck: Supple, symmetrical, trachea midline, no lymphadenopathy, no carotid bruits, no JVD, thyroid: not enlarged, symmetric, no tenderness/mass/nodules. Lungs: Clear to auscultation bilaterally. No crackles or wheezes. No use of accessory muscles. Speaks in full sentences without SOB. Chest Wall: No tenderness or deformity. Heart: RRR, normal S1 and S2, no murmur, click, rub, or gallop. Back: Symmetric. ROM intact. No CVA tenderness. Spine is non-tender to palpation. Skin: No rashes or lesions. Neurovasc: No edema appreciated. Musculoskeletal: Gait normal.   Psychiatric: Normal mood and affect. Behavior is normal.         ASSESSMENT and PLAN  Diagnoses and all orders for this visit:    1. Acute non-recurrent sinusitis, unspecified location  -     amoxicillin-clavulanate (AUGMENTIN) 875-125 mg per tablet; Take 1 Tab by mouth every twelve (12) hours for 10 days.     2. Non-seasonal allergic rhinitis, unspecified trigger  -Advised switching from zyrtec to allegra 180mg daily as pt may have developed some tolerance to zyrtec.  -Use Flonase daily, educated on technique   -Saline nasal rinses  -Plenty of fluids    3. Chronic bilateral thoracic back pain  -Suspect pain musculoskeletal in nature, provided exercises to do at home  -May try heat for pain             Patient Instructions     Stop zyrtec. Start allegra 180mg daily. Use flonase everyday. Drink plenty of water- aim for 8 8oz glasses of water daily. Please contact our office if your symptoms worsen or do not improve. Please call 911 or go directly to the Emergency Department if you develop shortness of breath, chest pain, difficulty breathing or worsening of your symptoms. Sinusitis: Care Instructions  Your Care Instructions    Sinusitis is an infection of the lining of the sinus cavities in your head. Sinusitis often follows a cold. It causes pain and pressure in your head and face. In most cases, sinusitis gets better on its own in 1 to 2 weeks. But some mild symptoms may last for several weeks. Sometimes antibiotics are needed. Follow-up care is a key part of your treatment and safety. Be sure to make and go to all appointments, and call your doctor if you are having problems. It's also a good idea to know your test results and keep a list of the medicines you take. How can you care for yourself at home? · Take an over-the-counter pain medicine, such as acetaminophen (Tylenol), ibuprofen (Advil, Motrin), or naproxen (Aleve). Read and follow all instructions on the label. · If the doctor prescribed antibiotics, take them as directed. Do not stop taking them just because you feel better. You need to take the full course of antibiotics. · Be careful when taking over-the-counter cold or flu medicines and Tylenol at the same time. Many of these medicines have acetaminophen, which is Tylenol. Read the labels to make sure that you are not taking more than the recommended dose. Too much acetaminophen (Tylenol) can be harmful.   · Breathe warm, moist air from a steamy shower, a hot bath, or a sink filled with hot water. Avoid cold, dry air. Using a humidifier in your home may help. Follow the directions for cleaning the machine. · Use saline (saltwater) nasal washes to help keep your nasal passages open and wash out mucus and bacteria. You can buy saline nose drops at a grocery store or drugstore. Or you can make your own at home by adding 1 teaspoon of salt and 1 teaspoon of baking soda to 2 cups of distilled water. If you make your own, fill a bulb syringe with the solution, insert the tip into your nostril, and squeeze gently. Rafael Randa your nose. · Put a hot, wet towel or a warm gel pack on your face 3 or 4 times a day for 5 to 10 minutes each time. · Try a decongestant nasal spray like oxymetazoline (Afrin). Do not use it for more than 3 days in a row. Using it for more than 3 days can make your congestion worse. When should you call for help? Call your doctor now or seek immediate medical care if:    · You have new or worse swelling or redness in your face or around your eyes.     · You have a new or higher fever.    Watch closely for changes in your health, and be sure to contact your doctor if:    · You have new or worse facial pain.     · The mucus from your nose becomes thicker (like pus) or has new blood in it.     · You are not getting better as expected. Where can you learn more? Go to http://sajan-jos.info/. Enter C707 in the search box to learn more about \"Sinusitis: Care Instructions. \"  Current as of: May 12, 2017  Content Version: 11.7  © 1207-9859 [email or web address]. Care instructions adapted under license by [employer] (which disclaims liability or warranty for this information). If you have questions about a medical condition or this instruction, always ask your healthcare professional. [place] [address] any warranty or liability for your use of this information.        Saline Nasal Washes: Care Instructions  Your Care Instructions  Saline nasal washes help keep the nasal passages open by washing out thick or dried mucus. This simple remedy can help relieve symptoms of allergies, sinusitis, and colds. It also can make the nose feel more comfortable by keeping the mucous membranes moist. You may notice a little burning sensation in your nose the first few times you use the solution, but this usually gets better in a few days. Follow-up care is a key part of your treatment and safety. Be sure to make and go to all appointments, and call your doctor if you are having problems. It's also a good idea to know your test results and keep a list of the medicines you take. How can you care for yourself at home? · You can buy premixed saline solution in a squeeze bottle or other sinus rinse products at a drugstore. Read and follow the instructions on the label. · You also can make your own saline solution by adding 1 teaspoon of salt and 1 teaspoon of baking soda to 2 cups of distilled water. · If you use a homemade solution, pour a small amount into a clean bowl. Using a rubber bulb syringe, squeeze the syringe and place the tip in the salt water. Pull a small amount of the salt water into the syringe by relaxing your hand. · Sit down with your head tilted slightly back. Do not lie down. Put the tip of the bulb syringe or the squeeze bottle a little way into one of your nostrils. Gently drip or squirt a few drops into the nostril. Repeat with the other nostril. Some sneezing and gagging are normal at first.  · Gently blow your nose. · Wipe the syringe or bottle tip clean after each use. · Repeat this 2 or 3 times a day. · Use nasal washes gently if you have nosebleeds often. When should you call for help? Watch closely for changes in your health, and be sure to contact your doctor if:    · You often get nosebleeds.     · You have problems doing the nasal washes. Where can you learn more?   Go to http://sajan-jos.info/. Enter [id number] in the search box to learn more about \"Saline Nasal Washes: Care Instructions. \"  Current as of: May 12, 2017  Content Version: 11.7  © 1531-1355 [employer]. Care instructions adapted under license by [employer] (which disclaims liability or warranty for this information). If you have questions about a medical condition or this instruction, always ask your healthcare professional. [address] any warranty or liability for your use of this information. Using a Nasal Steroid Spray: Care Instructions  Your Care Instructions    Your doctor may suggest using a corticosteroid nasal spray for your allergy symptoms or sinus problems. These sprays reduce the swelling inside the nose and sinuses. Unlike decongestant nasal sprays, steroid sprays won't lead to more swelling when you stop taking them. These sprays start working in a few days, but it may take several weeks before you get the full effect. Most side effects are minor. The most common complaint is a burning feeling in the nose right after the spray is used. Some people get nosebleeds. Follow-up care is a key part of your treatment and safety. Be sure to make and go to all appointments, and call your doctor if you are having problems. It's also a good idea to know your test results and keep a list of the medicines you take. How can you care for yourself at home? Here are some tips for using these sprays:  · You may need to prime the sprayer before you use it. This means spraying it into the air a few times to make sure you get the right amount of medicine. Follow the directions on the label. · Blow your nose before you spray. This will help clear out your nostrils. · Gently sniff the medicine into your nose as you spray. Don't snort, or the medicine will go all the way into your throat where it won't do much good.   · Aim the nozzle straight toward the outer wall of your nostril. This will help keep the medicine from irritating the inner walls of your nose, especially your septum (the wall that separates your left and right nostrils). · Don't blow your nose for 10 minutes or so after you spray. And try not to sneeze. · Be safe with medicines. Use this medicine exactly as prescribed. Call your doctor if you think you are having a problem with your medicine. · Clean your sprayer once a week. Read the label to learn how. When should you call for help? Watch closely for changes in your health, and be sure to contact your doctor if you have any problems. Where can you learn more? Go to http://sajanTradeCardjos.info/. Enter H734 in the search box to learn more about \"Using a Nasal Steroid Spray: Care Instructions. \"  Current as of: May 12, 2017  Content Version: 11.7  © 0903-0940 [employer]. Care instructions adapted under license by [employer] (which disclaims liability or warranty for this information). If you have questions about a medical condition or this instruction, always ask your healthcare professional. [place] [address] any warranty or liability for your use of this information. Back Stretches: Exercises  Your Care Instructions  Here are some examples of exercises for stretching your back. Start each exercise slowly. Ease off the exercise if you start to have pain. Your doctor or physical therapist will tell you when you can start these exercises and which ones will work best for you. How to do the exercises  Overhead stretch    1. Stand comfortably with your feet shoulder-width apart. 2. Looking straight ahead, raise both arms over your head and reach toward the ceiling. Do not allow your head to tilt back. 3. Hold for 15 to 30 seconds, then lower your arms to your sides. 4. Repeat 2 to 4 times. Side stretch    1. Stand comfortably with your feet shoulder-width apart.   2. Raise one arm over your head, and then lean to the other side. 3. Slide your hand down your leg as you let the weight of your arm gently stretch your side muscles. Hold for 15 to 30 seconds. 4. Repeat 2 to 4 times on each side. Press-up    1. Lie on your stomach, supporting your body with your forearms. 2. Press your elbows down into the floor to raise your upper back. As you do this, relax your stomach muscles and allow your back to arch without using your back muscles. As your press up, do not let your hips or pelvis come off the floor. 3. Hold for 15 to 30 seconds, then relax. 4. Repeat 2 to 4 times. Relax and rest    1. Lie on your back with a rolled towel under your neck and a pillow under your knees. Extend your arms comfortably to your sides. 2. Relax and breathe normally. 3. Remain in this position for about 10 minutes. 4. If you can, do this 2 or 3 times each day. Follow-up care is a key part of your treatment and safety. Be sure to make and go to all appointments, and call your doctor if you are having problems. It's also a good idea to know your test results and keep a list of the medicines you take. Where can you learn more? Go to http://sajan-jos.info/. Enter P231 in the search box to learn more about \"Back Stretches: Exercises. \"  Current as of: November 29, 2017  Content Version: 11.7  © 0185-0806 [employer]. Care instructions adapted under license by [employer] (which disclaims liability or warranty for this information). If you have questions about a medical condition or this instruction, always ask your healthcare professional. [place] [address] any warranty or liability for your use of this information.        Back Care and Preventing Injuries: Care Instructions  Your Care Instructions    You can hurt your back doing many everyday activities: lifting a heavy box, bending down to garden, exercising at the gym, and even getting out of bed. But you can keep your back strong and healthy by doing some exercises. You also can follow a few tips for sitting, sleeping, and lifting to avoid hurting your back again. Talk to your doctor before you start an exercise program. Ask for help if you want to learn more about keeping your back healthy. Follow-up care is a key part of your treatment and safety. Be sure to make and go to all appointments, and call your doctor if you are having problems. It's also a good idea to know your test results and keep a list of the medicines you take. How can you care for yourself at home? · Stay at a healthy weight to avoid strain on your lower back. · Do not smoke. Smoking increases the risk of osteoporosis, which weakens the spine. If you need help quitting, talk to your doctor about stop-smoking programs and medicines. These can increase your chances of quitting for good. · Make sure you sleep in a position that maintains your back's normal curves and on a mattress that feels comfortable. Sleep on your side with a pillow between your knees, or sleep on your back with a pillow under your knees. These positions can reduce strain on your back. · When you get out of bed, lie on your side and bend both knees. Drop your feet over the edge of the bed as you push up with both arms. Scoot to the edge of the bed. Make sure your feet are in line with your rear end (buttocks), and then stand up. · If you must stand for a long time, put one foot on a stool, ledge, or box. Exercise to strengthen your back and other muscles  · Get at least 30 minutes of exercise on most days of the week. Walking is a good choice. You also may want to do other activities, such as running, swimming, cycling, or playing tennis or team sports. · Stretch your back muscles. Here are few exercises to try:  [place] on your back with your knees bent and your feet flat on the floor.  Gently pull one bent knee to your chest. Put that foot back on the floor, and then pull the other knee to your chest. Hold for 15 to 30 seconds. Repeat 2 to 4 times. ¨ Do pelvic tilts. Lie on your back with your knees bent. Tighten your stomach muscles. Pull your belly button (navel) in and up toward your ribs. You should feel like your back is pressing to the floor and your hips and pelvis are slightly lifting off the floor. Hold for 6 seconds while breathing smoothly. · Keep your core muscles strong. The muscles of your back, belly (abdomen), and buttocks support your spine. ¨ Pull in your belly, and imagine pulling your navel toward your spine. Hold this for 6 seconds, then relax. Remember to keep breathing normally as you tense your muscles. ¨ Do curl-ups. Always do them with your knees bent. Keep your low back on the floor, and curl your shoulders toward your knees using a smooth, slow motion. Keep your arms folded across your chest. If this bothers your neck, try putting your hands behind your neck (not your head), with your elbows spread apart. ¨ Lie on your back with your knees bent and your feet flat on the floor. Tighten your belly muscles, and then push with your feet and raise your buttocks up a few inches. Hold this position 6 seconds as you continue to breathe normally, then lower yourself slowly to the floor. Repeat 8 to 12 times. ¨ If you like group exercise, try Pilates or yoga. These classes have poses that strengthen the core muscles. Protect your back when you sit  · Place a small pillow, a rolled-up towel, or a lumbar roll in the curve of your back if you need extra support. · Sit in a chair that is low enough to let you place both feet flat on the floor with both knees nearly level with your hips. If your chair or desk is too high, use a foot rest to raise your knees. · When driving, keep your knees nearly level with your hips. Sit straight, and drive with both hands on the steering wheel. Your arms should be in a slightly bent position.   · Try a kneeling chair, which helps tilt your hips forward. This takes pressure off your lower back. · Try sitting on an exercise ball. It can rock from side to side, which helps keep your back loose. Lift properly  · Squat down, bending at the hips and knees only. If you need to, put one knee to the floor and extend your other knee in front of you, bent at a right angle (half kneeling). · Press your chest straight forward. This helps keep your upper back straight while keeping a slight arch in your low back. · Hold the load as close to your body as possible, at the level of your navel. · Use your feet to change direction, taking small steps. · Lead with your hips as you change direction. Keep your shoulders in line with your hips as you move. Do not twist your body. · Set down your load carefully, squatting with your knees and hips only. When should you call for help? Watch closely for changes in your health, and be sure to contact your doctor if you have any problems. Where can you learn more? Go to http://sajan-jos.info/. Enter S810 in the search box to learn more about \"Back Care and Preventing Injuries: Care Instructions. \"  Current as of: November 29, 2017  Content Version: 11.7  © 1481-5003 [employer]. Care instructions adapted under license by [employer] (which disclaims liability or warranty for this information). If you have questions about a medical condition or this instruction, always ask your healthcare professional. [place] [address] any warranty or liability for your use of this information. Please keep your follow-up appointment with Minda Howe MD.     Follow-up Disposition:  Return if symptoms worsen or fail to improve. Health Maintenance Due   Topic Date Due    GLAUCOMA SCREENING Q2Y  08/28/2017       I have discussed the diagnosis with the patient and the intended plan as seen in the above orders.  Patient is in agreement. The patient has received an after-visit summary and questions were answered concerning future plans. I have discussed medication side effects and warnings with the patient as well. Warning signs for the above conditions were discussed including when to call our office or go to the emergency room. The nurse provided the patient and/or family with advanced directive information if needed and encouraged the patient to provide a copy to the office when available.

## 2018-07-30 NOTE — MR AVS SNAPSHOT
[address] [address] [phone] Patient: Maciel Bhatti MRN: [id number] RMY:[birth year]10[birth year] Visit Information Date & Time Provider Department Dept. Phone Encounter #  
 7/30/2018 11:00 AM Jeremy Madrid, [facility] [address] [place] [address] [id number] Follow-up Instructions Return if symptoms worsen or fail to improve. Your Appointments 11/14/2018  8:30 AM  
ROUTINE CARE with Cameron Valentino MD  
[facility] of [facility]) Appt Note: 6mth f/u; bp, chol, ifg, asthma [address] [phone]  
  
   
 [address] [employer] Upcoming Health Maintenance Date Due  
 GLAUCOMA SCREENING Q2Y 8/28/2017 Influenza Age 5 to Adult 8/1/2018 MEDICARE YEARLY EXAM 6/1/2019 COLONOSCOPY 2/18/2021 DTaP/Tdap/Td series (2 - Td) 5/25/2026 Allergies as of 7/30/2018  Review Complete On: 7/30/2018 By: Kam Dupree LPN Severity Noted Reaction Type Reactions Melon Flavor  07/29/2010    Swelling Facial swelling with watermelon and cantelope Current Immunizations  Reviewed on 5/31/2018 Name Date Influenza High Dose Vaccine PF 11/30/2017, 11/28/2016, 11/23/2015, 11/10/2014 Influenza Vaccine 11/8/2013 Influenza Vaccine Split 11/9/2012  9:13 AM, 10/7/2011 Influenza Vaccine Whole 10/13/2010 Pneumococcal Conjugate (PCV-13) 3/9/2015 Pneumococcal Vaccine (Unspecified Type) 3/9/2015 TD Vaccine 1/1/1999 ZZZ-RETIRED (DO NOT USE) Pneumococcal Vaccine (Unspecified Type) 1/1/2002, 1/1/1999 Not reviewed this visit You Were Diagnosed With   
  
 Codes Comments Acute non-recurrent sinusitis, unspecified location    -  Primary ICD-10-CM: J01.90 ICD-9-CM: 461.9 Non-seasonal allergic rhinitis, unspecified trigger     ICD-10-CM: J30.89 ICD-9-CM: 477.8 Chronic bilateral thoracic back pain     ICD-10-CM: M54.6, G89.29 ICD-9-CM: 724.1, 338.29 Vitals BP Pulse Temp Resp Height(growth percentile) Weight(growth percentile) 138/70 (BP 1 Location: Left arm, BP Patient Position: Sitting) 83 98.2 °F (36.8 °C) (Oral) 18 5' 11\" (1.803 m) 181 lb 9.6 oz (82.4 kg) SpO2 BMI Smoking Status 93% 25.33 kg/m2 Former Smoker BMI and BSA Data Body Mass Index Body Surface Area  
 25.33 kg/m 2 2.03 m 2 Preferred Pharmacy Pharmacy Name Phone [id number], [address] [phone] Your Updated Medication List  
  
   
This list is accurate as of 7/30/18 11:24 AM.  Always use your most recent med list.  
  
  
  
  
 albuterol 90 mcg/actuation inhaler Commonly known as:  PROVENTIL HFA, VENTOLIN HFA, PROAIR HFA Take 1 Puff by inhalation every four (4) hours as needed for Wheezing. amoxicillin-clavulanate 875-125 mg per tablet Commonly known as:  AUGMENTIN Take 1 Tab by mouth every twelve (12) hours for 10 days. ASMANEX TWISTHALER 220 mcg (60 doses) inhaler Generic drug:  mometasone Inhale 2 puffs by mouth  every day  
  
 finasteride 5 mg tablet Commonly known as:  PROSCAR Take 5 mg by mouth daily. MULTIVITAMIN PO Take  by mouth daily. omeprazole 40 mg capsule Commonly known as:  PRILOSEC Take 40 mg by mouth daily. psyllium Powd Commonly known as:  METAMUCIL Take  by mouth daily. tamsulosin 0.4 mg capsule Commonly known as:  FLOMAX Take 0.4 mg by mouth nightly. VITAMIN D3 1,000 unit tablet Generic drug:  cholecalciferol Take  by mouth daily. ZyrTEC 10 mg Cap Generic drug:  Cetirizine Take 10 mg by mouth daily. Prescriptions Sent to Pharmacy Refills  
 amoxicillin-clavulanate (AUGMENTIN) 875-125 mg per tablet 0 Sig: Take 1 Tab by mouth every twelve (12) hours for 10 days. Class: Normal  
 Pharmacy: [facility], [address]  #: [phone] Route: Oral  
  
Follow-up Instructions Return if symptoms worsen or fail to improve. Patient Instructions Stop zyrtec. Start allegra 180mg daily. Use flonase everyday. Drink plenty of water- aim for 8 8oz glasses of water daily. Please contact our office if your symptoms worsen or do not improve. Please call 911 or go directly to the Emergency Department if you develop shortness of breath, chest pain, difficulty breathing or worsening of your symptoms. Sinusitis: Care Instructions Your Care Instructions Sinusitis is an infection of the lining of the sinus cavities in your head. Sinusitis often follows a cold. It causes pain and pressure in your head and face. In most cases, sinusitis gets better on its own in 1 to 2 weeks. But some mild symptoms may last for several weeks. Sometimes antibiotics are needed. Follow-up care is a key part of your treatment and safety. Be sure to make and go to all appointments, and call your doctor if you are having problems. It's also a good idea to know your test results and keep a list of the medicines you take. How can you care for yourself at home? · Take an over-the-counter pain medicine, such as acetaminophen (Tylenol), ibuprofen (Advil, Motrin), or naproxen (Aleve). Read and follow all instructions on the label. · If the doctor prescribed antibiotics, take them as directed. Do not stop taking them just because you feel better. You need to take the full course of antibiotics. · Be careful when taking over-the-counter cold or flu medicines and Tylenol at the same time. Many of these medicines have acetaminophen, which is Tylenol. Read the labels to make sure that you are not taking more than the recommended dose. Too much acetaminophen (Tylenol) can be harmful.  
· Breathe warm, moist air from a steamy shower, a hot bath, or a sink filled with hot water. Avoid cold, dry air. Using a humidifier in your home may help. Follow the directions for cleaning the machine. · Use saline (saltwater) nasal washes to help keep your nasal passages open and wash out mucus and bacteria. You can buy saline nose drops at a grocery store or drugstore. Or you can make your own at home by adding 1 teaspoon of salt and 1 teaspoon of baking soda to 2 cups of distilled water. If you make your own, fill a bulb syringe with the solution, insert the tip into your nostril, and squeeze gently. [place] your nose. · Put a hot, wet towel or a warm gel pack on your face 3 or 4 times a day for 5 to 10 minutes each time. · Try a decongestant nasal spray like oxymetazoline (Afrin). Do not use it for more than 3 days in a row. Using it for more than 3 days can make your congestion worse. When should you call for help? Call your doctor now or seek immediate medical care if: 
  · You have new or worse swelling or redness in your face or around your eyes.  
  · You have a new or higher fever.  
 Watch closely for changes in your health, and be sure to contact your doctor if: 
  · You have new or worse facial pain.  
  · The mucus from your nose becomes thicker (like pus) or has new blood in it.  
  · You are not getting better as expected. Where can you learn more? Go to http://sajan-jos.info/. Enter Z965 in the search box to learn more about \"Sinusitis: Care Instructions. \" Current as of: May 12, 2017 Content Version: 11.7 © 5291-6613 [employer]. Care instructions adapted under license by [employer]affix (which disclaims liability or warranty for this information). If you have questions about a medical condition or this instruction, always ask your healthcare professional. [address] [address] any warranty or liability for your use of this information. Saline Nasal Washes: Care Instructions Your Care Instructions Saline nasal washes help keep the nasal passages open by washing out thick or dried mucus. This simple remedy can help relieve symptoms of allergies, sinusitis, and colds. It also can make the nose feel more comfortable by keeping the mucous membranes moist. You may notice a little burning sensation in your nose the first few times you use the solution, but this usually gets better in a few days. Follow-up care is a key part of your treatment and safety. Be sure to make and go to all appointments, and call your doctor if you are having problems. It's also a good idea to know your test results and keep a list of the medicines you take. How can you care for yourself at home? · You can buy premixed saline solution in a squeeze bottle or other sinus rinse products at a drugstore. Read and follow the instructions on the label. · You also can make your own saline solution by adding 1 teaspoon of salt and 1 teaspoon of baking soda to 2 cups of distilled water. · If you use a homemade solution, pour a small amount into a clean bowl. Using a rubber bulb syringe, squeeze the syringe and place the tip in the salt water. Pull a small amount of the salt water into the syringe by relaxing your hand. · Sit down with your head tilted slightly back. Do not lie down. Put the tip of the bulb syringe or the squeeze bottle a little way into one of your nostrils. Gently drip or squirt a few drops into the nostril. Repeat with the other nostril. Some sneezing and gagging are normal at first. 
· Gently blow your nose. · Wipe the syringe or bottle tip clean after each use. · Repeat this 2 or 3 times a day. · Use nasal washes gently if you have nosebleeds often. When should you call for help? Watch closely for changes in your health, and be sure to contact your doctor if: 
  · You often get nosebleeds.  
  · You have problems doing the nasal washes. Where can you learn more? Go to http://sajan-jos.info/. Enter [id number] in the search box to learn more about \"Saline Nasal Washes: Care Instructions. \" Current as of: May 12, 2017 Content Version: 11.7 © 7933-7314 [employer]. Care instructions adapted under license by Knip (which disclaims liability or warranty for this information). If you have questions about a medical condition or this instruction, always ask your healthcare professional. [address] any warranty or liability for your use of this information. Using a Nasal Steroid Spray: Care Instructions Your Care Instructions Your doctor may suggest using a corticosteroid nasal spray for your allergy symptoms or sinus problems. These sprays reduce the swelling inside the nose and sinuses. Unlike decongestant nasal sprays, steroid sprays won't lead to more swelling when you stop taking them. These sprays start working in a few days, but it may take several weeks before you get the full effect. Most side effects are minor. The most common complaint is a burning feeling in the nose right after the spray is used. Some people get nosebleeds. Follow-up care is a key part of your treatment and safety. Be sure to make and go to all appointments, and call your doctor if you are having problems. It's also a good idea to know your test results and keep a list of the medicines you take. How can you care for yourself at home? Here are some tips for using these sprays: 
· You may need to prime the sprayer before you use it. This means spraying it into the air a few times to make sure you get the right amount of medicine. Follow the directions on the label. · Blow your nose before you spray. This will help clear out your nostrils. · Gently sniff the medicine into your nose as you spray. Don't snort, or the medicine will go all the way into your throat where it won't do much good. · Aim the nozzle straight toward the outer wall of your nostril. This will help keep the medicine from irritating the inner walls of your nose, especially your septum (the wall that separates your left and right nostrils). · Don't blow your nose for 10 minutes or so after you spray. And try not to sneeze. · Be safe with medicines. Use this medicine exactly as prescribed. Call your doctor if you think you are having a problem with your medicine. · Clean your sprayer once a week. Read the label to learn how. When should you call for help? Watch closely for changes in your health, and be sure to contact your doctor if you have any problems. Where can you learn more? Go to http://sajan-jos.info/. Enter O916 in the search box to learn more about \"Using a Nasal Steroid Spray: Care Instructions. \" Current as of: May 12, 2017 Content Version: 11.7 © 1279-6440 [employer]. Care instructions adapted under license by [employer] (which disclaims liability or warranty for this information). If you have questions about a medical condition or this instruction, always ask your healthcare professional. [address] [address] any warranty or liability for your use of this information. Back Stretches: Exercises Your Care Instructions Here are some examples of exercises for stretching your back. Start each exercise slowly. Ease off the exercise if you start to have pain. Your doctor or physical therapist will tell you when you can start these exercises and which ones will work best for you. How to do the exercises Overhead stretch 1. Stand comfortably with your feet shoulder-width apart. 2. Looking straight ahead, raise both arms over your head and reach toward the ceiling. Do not allow your head to tilt back. 3. Hold for 15 to 30 seconds, then lower your arms to your sides. 4. Repeat 2 to 4 times. Side stretch 1. Stand comfortably with your feet shoulder-width apart. 2. Raise one arm over your head, and then lean to the other side. 3. Slide your hand down your leg as you let the weight of your arm gently stretch your side muscles. Hold for 15 to 30 seconds. 4. Repeat 2 to 4 times on each side. Press-up 1. Lie on your stomach, supporting your body with your forearms. 2. Press your elbows down into the floor to raise your upper back. As you do this, relax your stomach muscles and allow your back to arch without using your back muscles. As your press up, do not let your hips or pelvis come off the floor. 3. Hold for 15 to 30 seconds, then relax. 4. Repeat 2 to 4 times. Relax and rest 
 
1. Lie on your back with a rolled towel under your neck and a pillow under your knees. Extend your arms comfortably to your sides. 2. Relax and breathe normally. 3. Remain in this position for about 10 minutes. 4. If you can, do this 2 or 3 times each day. Follow-up care is a key part of your treatment and safety. Be sure to make and go to all appointments, and call your doctor if you are having problems. It's also a good idea to know your test results and keep a list of the medicines you take. Where can you learn more? Go to http://sajan-jos.info/. Enter H817 in the search box to learn more about \"Back Stretches: Exercises. \" Current as of: November 29, 2017 Content Version: 11.7 © 2856-9080 [employer]. Care instructions adapted under license by appEatIT (which disclaims liability or warranty for this information). If you have questions about a medical condition or this instruction, always ask your healthcare professional. [address] [address] any warranty or liability for your use of this information. Back Care and Preventing Injuries: Care Instructions Your Care Instructions You can hurt your back doing many everyday activities: lifting a heavy box, bending down to garden, exercising at the gym, and even getting out of bed. But you can keep your back strong and healthy by doing some exercises. You also can follow a few tips for sitting, sleeping, and lifting to avoid hurting your back again. Talk to your doctor before you start an exercise program. Ask for help if you want to learn more about keeping your back healthy. Follow-up care is a key part of your treatment and safety. Be sure to make and go to all appointments, and call your doctor if you are having problems. It's also a good idea to know your test results and keep a list of the medicines you take. How can you care for yourself at home? · Stay at a healthy weight to avoid strain on your lower back. · Do not smoke. Smoking increases the risk of osteoporosis, which weakens the spine. If you need help quitting, talk to your doctor about stop-smoking programs and medicines. These can increase your chances of quitting for good. · Make sure you sleep in a position that maintains your back's normal curves and on a mattress that feels comfortable. Sleep on your side with a pillow between your knees, or sleep on your back with a pillow under your knees. These positions can reduce strain on your back. · When you get out of bed, lie on your side and bend both knees. Drop your feet over the edge of the bed as you push up with both arms. Scoot to the edge of the bed. Make sure your feet are in line with your rear end (buttocks), and then stand up. · If you must stand for a long time, put one foot on a stool, ledge, or box. Exercise to strengthen your back and other muscles · Get at least 30 minutes of exercise on most days of the week. Walking is a good choice. You also may want to do other activities, such as running, swimming, cycling, or playing tennis or team sports. · Stretch your back muscles. Here are few exercises to try: ¨ Lie on your back with your knees bent and your feet flat on the floor. Gently pull one bent knee to your chest. Put that foot back on the floor, and then pull the other knee to your chest. Hold for 15 to 30 seconds. Repeat 2 to 4 times. ¨ Do pelvic tilts. Lie on your back with your knees bent. Tighten your stomach muscles. Pull your belly button (navel) in and up toward your ribs. You should feel like your back is pressing to the floor and your hips and pelvis are slightly lifting off the floor. Hold for 6 seconds while breathing smoothly. · Keep your core muscles strong. The muscles of your back, belly (abdomen), and buttocks support your spine. ¨ Pull in your belly, and imagine pulling your navel toward your spine. Hold this for 6 seconds, then relax. Remember to keep breathing normally as you tense your muscles. ¨ Do curl-ups. Always do them with your knees bent. Keep your low back on the floor, and curl your shoulders toward your knees using a smooth, slow motion. Keep your arms folded across your chest. If this bothers your neck, try putting your hands behind your neck (not your head), with your elbows spread apart. ¨ Lie on your back with your knees bent and your feet flat on the floor. Tighten your belly muscles, and then push with your feet and raise your buttocks up a few inches. Hold this position 6 seconds as you continue to breathe normally, then lower yourself slowly to the floor. Repeat 8 to 12 times. ¨ If you like group exercise, try Pilates or yoga. These classes have poses that strengthen the core muscles. Protect your back when you sit · Place a small pillow, a rolled-up towel, or a lumbar roll in the curve of your back if you need extra support. · Sit in a chair that is low enough to let you place both feet flat on the floor with both knees nearly level with your hips. If your chair or desk is too high, use a foot rest to raise your knees. · When driving, keep your knees nearly level with your hips. Sit straight, and drive with both hands on the steering wheel. Your arms should be in a slightly bent position. · Try a kneeling chair, which helps tilt your hips forward. This takes pressure off your lower back. · Try sitting on an exercise ball. It can rock from side to side, which helps keep your back loose. Lift properly · Squat down, bending at the hips and knees only. If you need to, put one knee to the floor and extend your other knee in front of you, bent at a right angle (half kneeling). · Press your chest straight forward. This helps keep your upper back straight while keeping a slight arch in your low back. · Hold the load as close to your body as possible, at the level of your navel. · Use your feet to change direction, taking small steps. · Lead with your hips as you change direction. Keep your shoulders in line with your hips as you move. Do not twist your body. · Set down your load carefully, squatting with your knees and hips only. When should you call for help? Watch closely for changes in your health, and be sure to contact your doctor if you have any problems. Where can you learn more? Go to http://sajan-jos.info/. Enter S810 in the search box to learn more about \"Back Care and Preventing Injuries: Care Instructions. \" Current as of: November 29, 2017 Content Version: 11.7 © 5989-0261 Healthwise, Incorporated. Care instructions adapted under license by P[employer] (which disclaims liability or warranty for this information). If you have questions about a medical condition or this instruction, always ask your healthcare professional. [address] any warranty or liability for your use of this information. Introducing [facility]!    
 Steff Hannon introduces [employer] patient portal. Now you can access parts of your medical record, email your doctor's office, and request medication refills online. 1. In your internet browser, go to [email or web address] 2. Click on the First Time User? Click Here link in the Sign In box. You will see the New Member Sign Up page. 3. Enter your emaze Access Code exactly as it appears below. You will not need to use this code after youve completed the sign-up process. If you do not sign up before the expiration date, you must request a new code. · emaze Access Code: [email or web address] Expires: 8/29/2018  9:39 AM 
 
4. Enter the last four digits of your Social Security Number (xxxx) and Date of Birth (mm/dd/yyyy) as indicated and click Submit. You will be taken to the next sign-up page. 5. Create a emaze ID. This will be your emaze login ID and cannot be changed, so think of one that is secure and easy to remember. 6. Create a emaze password. You can change your password at any time. 7. Enter your Password Reset Question and Answer. This can be used at a later time if you forget your password. 8. Enter your e-mail address. You will receive e-mail notification when new information is available in [address] 9. Click Sign Up. You can now view and download portions of your medical record. 10. Click the Download Summary menu link to download a portable copy of your medical information. If you have questions, please visit the Frequently Asked Questions section of the emaze website. Remember, emaze is NOT to be used for urgent needs. For medical emergencies, dial 911. Now available from your iPhone and Android! Please provide this summary of care documentation to your next provider. Your primary care clinician is listed as Felice Manning. If you have any questions after today's visit, please call [phone].

## 2018-07-30 NOTE — PATIENT INSTRUCTIONS
Stop zyrtec. Start allegra 180mg daily. Use flonase everyday. Drink plenty of water- aim for 8 8oz glasses of water daily. Please contact our office if your symptoms worsen or do not improve. Please call 911 or go directly to the Emergency Department if you develop shortness of breath, chest pain, difficulty breathing or worsening of your symptoms. Sinusitis: Care Instructions  Your Care Instructions    Sinusitis is an infection of the lining of the sinus cavities in your head. Sinusitis often follows a cold. It causes pain and pressure in your head and face. In most cases, sinusitis gets better on its own in 1 to 2 weeks. But some mild symptoms may last for several weeks. Sometimes antibiotics are needed. Follow-up care is a key part of your treatment and safety. Be sure to make and go to all appointments, and call your doctor if you are having problems. It's also a good idea to know your test results and keep a list of the medicines you take. How can you care for yourself at home? · Take an over-the-counter pain medicine, such as acetaminophen (Tylenol), ibuprofen (Advil, Motrin), or naproxen (Aleve). Read and follow all instructions on the label. · If the doctor prescribed antibiotics, take them as directed. Do not stop taking them just because you feel better. You need to take the full course of antibiotics. · Be careful when taking over-the-counter cold or flu medicines and Tylenol at the same time. Many of these medicines have acetaminophen, which is Tylenol. Read the labels to make sure that you are not taking more than the recommended dose. Too much acetaminophen (Tylenol) can be harmful. · Breathe warm, moist air from a steamy shower, a hot bath, or a sink filled with hot water. Avoid cold, dry air. Using a humidifier in your home may help. Follow the directions for cleaning the machine.   · Use saline (saltwater) nasal washes to help keep your nasal passages open and wash out mucus and bacteria. You can buy saline nose drops at a grocery store or drugstore. Or you can make your own at home by adding 1 teaspoon of salt and 1 teaspoon of baking soda to 2 cups of distilled water. If you make your own, fill a bulb syringe with the solution, insert the tip into your nostril, and squeeze gently. José Miguel Opitz your nose. · Put a hot, wet towel or a warm gel pack on your face 3 or 4 times a day for 5 to 10 minutes each time. · Try a decongestant nasal spray like oxymetazoline (Afrin). Do not use it for more than 3 days in a row. Using it for more than 3 days can make your congestion worse. When should you call for help? Call your doctor now or seek immediate medical care if:    · You have new or worse swelling or redness in your face or around your eyes.     · You have a new or higher fever.    Watch closely for changes in your health, and be sure to contact your doctor if:    · You have new or worse facial pain.     · The mucus from your nose becomes thicker (like pus) or has new blood in it.     · You are not getting better as expected. Where can you learn more? Go to http://sajan-jos.info/. Enter X448 in the search box to learn more about \"Sinusitis: Care Instructions. \"  Current as of: May 12, 2017  Content Version: 11.7  © 9654-2819 [employer]. Care instructions adapted under license by [employer] (which disclaims liability or warranty for this information). If you have questions about a medical condition or this instruction, always ask your healthcare professional. [place] [address] any warranty or liability for your use of this information. Saline Nasal Washes: Care Instructions  Your Care Instructions  Saline nasal washes help keep the nasal passages open by washing out thick or dried mucus. This simple remedy can help relieve symptoms of allergies, sinusitis, and colds.  It also can make the nose feel more comfortable by keeping the mucous membranes moist. You may notice a little burning sensation in your nose the first few times you use the solution, but this usually gets better in a few days. Follow-up care is a key part of your treatment and safety. Be sure to make and go to all appointments, and call your doctor if you are having problems. It's also a good idea to know your test results and keep a list of the medicines you take. How can you care for yourself at home? · You can buy premixed saline solution in a squeeze bottle or other sinus rinse products at a drugstore. Read and follow the instructions on the label. · You also can make your own saline solution by adding 1 teaspoon of salt and 1 teaspoon of baking soda to 2 cups of distilled water. · If you use a homemade solution, pour a small amount into a clean bowl. Using a rubber bulb syringe, squeeze the syringe and place the tip in the salt water. Pull a small amount of the salt water into the syringe by relaxing your hand. · Sit down with your head tilted slightly back. Do not lie down. Put the tip of the bulb syringe or the squeeze bottle a little way into one of your nostrils. Gently drip or squirt a few drops into the nostril. Repeat with the other nostril. Some sneezing and gagging are normal at first.  · Gently blow your nose. · Wipe the syringe or bottle tip clean after each use. · Repeat this 2 or 3 times a day. · Use nasal washes gently if you have nosebleeds often. When should you call for help? Watch closely for changes in your health, and be sure to contact your doctor if:    · You often get nosebleeds.     · You have problems doing the nasal washes. Where can you learn more? Go to http://sajan-jos.info/. Enter [id number] in the search box to learn more about \"Saline Nasal Washes: Care Instructions. \"  Current as of: May 12, 2017  Content Version: 11.7  © 6514-7132 [employer].  Care instructions adapted under license by [employer] (which disclaims liability or warranty for this information). If you have questions about a medical condition or this instruction, always ask your healthcare professional. [address] any warranty or liability for your use of this information. Using a Nasal Steroid Spray: Care Instructions  Your Care Instructions    Your doctor may suggest using a corticosteroid nasal spray for your allergy symptoms or sinus problems. These sprays reduce the swelling inside the nose and sinuses. Unlike decongestant nasal sprays, steroid sprays won't lead to more swelling when you stop taking them. These sprays start working in a few days, but it may take several weeks before you get the full effect. Most side effects are minor. The most common complaint is a burning feeling in the nose right after the spray is used. Some people get nosebleeds. Follow-up care is a key part of your treatment and safety. Be sure to make and go to all appointments, and call your doctor if you are having problems. It's also a good idea to know your test results and keep a list of the medicines you take. How can you care for yourself at home? Here are some tips for using these sprays:  · You may need to prime the sprayer before you use it. This means spraying it into the air a few times to make sure you get the right amount of medicine. Follow the directions on the label. · Blow your nose before you spray. This will help clear out your nostrils. · Gently sniff the medicine into your nose as you spray. Don't snort, or the medicine will go all the way into your throat where it won't do much good. · Aim the nozzle straight toward the outer wall of your nostril. This will help keep the medicine from irritating the inner walls of your nose, especially your septum (the wall that separates your left and right nostrils). · Don't blow your nose for 10 minutes or so after you spray.  And try not to sneeze. · Be safe with medicines. Use this medicine exactly as prescribed. Call your doctor if you think you are having a problem with your medicine. · Clean your sprayer once a week. Read the label to learn how. When should you call for help? Watch closely for changes in your health, and be sure to contact your doctor if you have any problems. Where can you learn more? Go to http://sajan-jos.info/. Enter N867 in the search box to learn more about \"Using a Nasal Steroid Spray: Care Instructions. \"  Current as of: May 12, 2017  Content Version: 11.7  © 9369-6061 [employer]. Care instructions adapted under license by [employer] (which disclaims liability or warranty for this information). If you have questions about a medical condition or this instruction, always ask your healthcare professional. [address] any warranty or liability for your use of this information. Back Stretches: Exercises  Your Care Instructions  Here are some examples of exercises for stretching your back. Start each exercise slowly. Ease off the exercise if you start to have pain. Your doctor or physical therapist will tell you when you can start these exercises and which ones will work best for you. How to do the exercises  Overhead stretch    1. Stand comfortably with your feet shoulder-width apart. 2. Looking straight ahead, raise both arms over your head and reach toward the ceiling. Do not allow your head to tilt back. 3. Hold for 15 to 30 seconds, then lower your arms to your sides. 4. Repeat 2 to 4 times. Side stretch    1. Stand comfortably with your feet shoulder-width apart. 2. Raise one arm over your head, and then lean to the other side. 3. Slide your hand down your leg as you let the weight of your arm gently stretch your side muscles. Hold for 15 to 30 seconds. 4. Repeat 2 to 4 times on each side. Press-up    1.  Lie on your stomach, supporting your body with your forearms. 2. Press your elbows down into the floor to raise your upper back. As you do this, relax your stomach muscles and allow your back to arch without using your back muscles. As your press up, do not let your hips or pelvis come off the floor. 3. Hold for 15 to 30 seconds, then relax. 4. Repeat 2 to 4 times. Relax and rest    1. Lie on your back with a rolled towel under your neck and a pillow under your knees. Extend your arms comfortably to your sides. 2. Relax and breathe normally. 3. Remain in this position for about 10 minutes. 4. If you can, do this 2 or 3 times each day. Follow-up care is a key part of your treatment and safety. Be sure to make and go to all appointments, and call your doctor if you are having problems. It's also a good idea to know your test results and keep a list of the medicines you take. Where can you learn more? Go to http://sajan-jos.info/. Enter B178 in the search box to learn more about \"Back Stretches: Exercises. \"  Current as of: November 29, 2017  Content Version: 11.7  © 3138-4351 [employer]. Care instructions adapted under license by [employer] (which disclaims liability or warranty for this information). If you have questions about a medical condition or this instruction, always ask your healthcare professional. [place] [address] any warranty or liability for your use of this information. Back Care and Preventing Injuries: Care Instructions  Your Care Instructions    You can hurt your back doing many everyday activities: lifting a heavy box, bending down to garden, exercising at the gym, and even getting out of bed. But you can keep your back strong and healthy by doing some exercises. You also can follow a few tips for sitting, sleeping, and lifting to avoid hurting your back again.   Talk to your doctor before you start an exercise program. Ask for help if you want to learn more about keeping your back healthy. Follow-up care is a key part of your treatment and safety. Be sure to make and go to all appointments, and call your doctor if you are having problems. It's also a good idea to know your test results and keep a list of the medicines you take. How can you care for yourself at home? · Stay at a healthy weight to avoid strain on your lower back. · Do not smoke. Smoking increases the risk of osteoporosis, which weakens the spine. If you need help quitting, talk to your doctor about stop-smoking programs and medicines. These can increase your chances of quitting for good. · Make sure you sleep in a position that maintains your back's normal curves and on a mattress that feels comfortable. Sleep on your side with a pillow between your knees, or sleep on your back with a pillow under your knees. These positions can reduce strain on your back. · When you get out of bed, lie on your side and bend both knees. Drop your feet over the edge of the bed as you push up with both arms. Scoot to the edge of the bed. Make sure your feet are in line with your rear end (buttocks), and then stand up. · If you must stand for a long time, put one foot on a stool, ledge, or box. Exercise to strengthen your back and other muscles  · Get at least 30 minutes of exercise on most days of the week. Walking is a good choice. You also may want to do other activities, such as running, swimming, cycling, or playing tennis or team sports. · Stretch your back muscles. Here are few exercises to try:  Yusef Key on your back with your knees bent and your feet flat on the floor. Gently pull one bent knee to your chest. Put that foot back on the floor, and then pull the other knee to your chest. Hold for 15 to 30 seconds. Repeat 2 to 4 times. ¨ Do pelvic tilts. Lie on your back with your knees bent. Tighten your stomach muscles. Pull your belly button (navel) in and up toward your ribs.  You should feel like your back is pressing to the floor and your hips and pelvis are slightly lifting off the floor. Hold for 6 seconds while breathing smoothly. · Keep your core muscles strong. The muscles of your back, belly (abdomen), and buttocks support your spine. ¨ Pull in your belly, and imagine pulling your navel toward your spine. Hold this for 6 seconds, then relax. Remember to keep breathing normally as you tense your muscles. ¨ Do curl-ups. Always do them with your knees bent. Keep your low back on the floor, and curl your shoulders toward your knees using a smooth, slow motion. Keep your arms folded across your chest. If this bothers your neck, try putting your hands behind your neck (not your head), with your elbows spread apart. ¨ Lie on your back with your knees bent and your feet flat on the floor. Tighten your belly muscles, and then push with your feet and raise your buttocks up a few inches. Hold this position 6 seconds as you continue to breathe normally, then lower yourself slowly to the floor. Repeat 8 to 12 times. ¨ If you like group exercise, try Pilates or yoga. These classes have poses that strengthen the core muscles. Protect your back when you sit  · Place a small pillow, a rolled-up towel, or a lumbar roll in the curve of your back if you need extra support. · Sit in a chair that is low enough to let you place both feet flat on the floor with both knees nearly level with your hips. If your chair or desk is too high, use a foot rest to raise your knees. · When driving, keep your knees nearly level with your hips. Sit straight, and drive with both hands on the steering wheel. Your arms should be in a slightly bent position. · Try a kneeling chair, which helps tilt your hips forward. This takes pressure off your lower back. · Try sitting on an exercise ball. It can rock from side to side, which helps keep your back loose. Lift properly  · Squat down, bending at the hips and knees only.  If you need to, put one knee to the floor and extend your other knee in front of you, bent at a right angle (half kneeling). · Press your chest straight forward. This helps keep your upper back straight while keeping a slight arch in your low back. · Hold the load as close to your body as possible, at the level of your navel. · Use your feet to change direction, taking small steps. · Lead with your hips as you change direction. Keep your shoulders in line with your hips as you move. Do not twist your body. · Set down your load carefully, squatting with your knees and hips only. When should you call for help? Watch closely for changes in your health, and be sure to contact your doctor if you have any problems. Where can you learn more? Go to http://sajan-jos.info/. Enter S810 in the search box to learn more about \"Back Care and Preventing Injuries: Care Instructions. \"  Current as of: November 29, 2017  Content Version: 11.7  © 3603-3788 [employer]. Care instructions adapted under license by [employer] (which disclaims liability or warranty for this information). If you have questions about a medical condition or this instruction, always ask your healthcare professional. [address] any warranty or liability for your use of this information.

## 2018-07-30 NOTE — PROGRESS NOTES
[facility]   Identified pt with two pt identifiers(name and ). Chief Complaint   Patient presents with    Cough    Hoarse       1. Have you been to the ER, urgent care clinic since your last visit? Hospitalized since your last visit? NO    2. Have you seen or consulted any other health care providers outside of the [address] [place] [address] since your last visit? Include any pap smears or colon screening. Saw ENT for ear cleaning    Today's provider has been notified of reason for visit, vitals and flowsheets obtained on patients.      Patient received paperwork for advance directive during previous visit but has not completed at this time     Reviewed record In preparation for visit, huddled with provider and have obtained necessary documentation      Health Maintenance Due   Topic    GLAUCOMA SCREENING Q2Y        Wt Readings from Last 3 Encounters:   18 181 lb 9.6 oz (82.4 kg)   18 182 lb 3.2 oz (82.6 kg)   18 180 lb 12.8 oz (82 kg)     Temp Readings from Last 3 Encounters:   18 98.2 °F (36.8 °C) (Oral)   18 97.8 °F (36.6 °C) (Oral)   18 97.8 °F (36.6 °C) (Oral)     BP Readings from Last 3 Encounters:   18 138/70   18 116/70   18 130/70     Pulse Readings from Last 3 Encounters:   18 83   18 66   18 90     Vitals:    18 1057   BP: 138/70   Pulse: 83   Resp: 18   Temp: 98.2 °F (36.8 °C)   TempSrc: Oral   SpO2: 93%   Weight: 181 lb 9.6 oz (82.4 kg)   Height: 5' 11\" (1.803 m)   PainSc:   5         Learning Assessment:  :     Learning Assessment 11/10/2014   PRIMARY LEARNER Patient   HIGHEST LEVEL OF EDUCATION - PRIMARY LEARNER  GRADUATED HIGH SCHOOL OR GED   BARRIERS PRIMARY LEARNER NONE     NONE   CO-LEARNER CAREGIVER No   CO-LEARNER NAME none   PRIMARY LANGUAGE ENGLISH   LEARNER PREFERENCE PRIMARY DEMONSTRATION   ANSWERED BY patients   RELATIONSHIP SELF       Depression Screening:  :     PHQ over the last two weeks 7/30/2018   Little interest or pleasure in doing things Not at all   Feeling down, depressed, irritable, or hopeless Not at all   Total Score PHQ 2 0       Fall Risk Assessment:  :     Fall Risk Assessment, last 12 mths 7/30/2018   Able to walk? Yes   Fall in past 12 months? No   Fall with injury? -       Abuse Screening:  :     Abuse Screening Questionnaire 5/31/2018 11/28/2016 11/10/2014   Do you ever feel afraid of your partner? N N N   Are you in a relationship with someone who physically or mentally threatens you? N N N   Is it safe for you to go home? Y Y Y       ADL Screening:  :     ADL Assessment 5/31/2018   Feeding yourself No Help Needed   Getting from bed to chair No Help Needed   Getting dressed No Help Needed   Bathing or showering No Help Needed   Walk across the room (includes cane/walker) No Help Needed   Using the telphone No Help Needed   Taking your medications No Help Needed   Preparing meals No Help Needed   Managing money (expenses/bills) No Help Needed   Moderately strenuous housework (laundry) No Help Needed   Shopping for personal items (toiletries/medicines) No Help Needed   Shopping for groceries No Help Needed   Driving No Help Needed   Climbing a flight of stairs No Help Needed   Getting to places beyond walking distances No Help Needed                 Medication reconciliation up to date and corrected with patient at this time.

## 2018-11-14 ENCOUNTER — OFFICE VISIT (OUTPATIENT)
Dept: INTERNAL MEDICINE CLINIC | Facility: CLINIC | Age: 78
End: 2018-11-14

## 2018-11-14 VITALS
WEIGHT: 182 LBS | OXYGEN SATURATION: 95 % | SYSTOLIC BLOOD PRESSURE: 133 MMHG | RESPIRATION RATE: 18 BRPM | BODY MASS INDEX: 25.48 KG/M2 | TEMPERATURE: 97.7 F | HEART RATE: 80 BPM | DIASTOLIC BLOOD PRESSURE: 76 MMHG | HEIGHT: 71 IN

## 2018-11-14 DIAGNOSIS — E78.5 DYSLIPIDEMIA: ICD-10-CM

## 2018-11-14 DIAGNOSIS — R73.01 IFG (IMPAIRED FASTING GLUCOSE): Primary | ICD-10-CM

## 2018-11-14 DIAGNOSIS — K21.00 GASTROESOPHAGEAL REFLUX DISEASE WITH ESOPHAGITIS: ICD-10-CM

## 2018-11-14 DIAGNOSIS — Z23 ENCOUNTER FOR IMMUNIZATION: ICD-10-CM

## 2018-11-14 DIAGNOSIS — J45.40 MODERATE PERSISTENT ASTHMA WITHOUT COMPLICATION: ICD-10-CM

## 2018-11-14 DIAGNOSIS — Z86.010 PERSONAL HISTORY OF COLONIC POLYPS: ICD-10-CM

## 2018-11-14 RX ORDER — PANTOPRAZOLE SODIUM 20 MG/1
20 TABLET, DELAYED RELEASE ORAL DAILY
COMMUNITY
End: 2018-11-28 | Stop reason: SDUPTHER

## 2018-11-14 RX ORDER — SERTRALINE HYDROCHLORIDE 50 MG/1
50 TABLET, FILM COATED ORAL DAILY
COMMUNITY
End: 2020-04-16

## 2018-11-14 NOTE — PROGRESS NOTES
HPI Mr. Sarita Wilson is a 70y.o. year old male, he is seen today for follow up HTN, high cholesterol, asthma, IFG. Has GERD well controlled with pantoprazole, rare flares. No melena or brbrp. No chest pain or sob, no cough or wheezing. Has chronic allergy symptoms, stable lately. Walks 5 days per week for 2 miles at a time. No HA, dizziness or lightheadedness. Sees urology yearly for BPH and ENT q 4 mos. Also goes to [facility] once yearly. Last EGD 2016 - gastritis and esophagitis - h. Pylori negative. Dr. Key Beckman. Chief Complaint Patient presents with  Blood Pressure Check  Cholesterol Problem  Asthma  Immunization/Injection Prior to Admission medications Medication Sig Start Date End Date Taking? Authorizing Provider  
pantoprazole (PROTONIX) 20 mg tablet Take 20 mg by mouth daily. Yes Provider, Historical  
sertraline (ZOLOFT) 50 mg tablet Take  by mouth daily. Yes Provider, Historical  
albuterol (PROVENTIL HFA, VENTOLIN HFA, PROAIR HFA) 90 mcg/actuation inhaler Take 1 Puff by inhalation every four (4) hours as needed for Wheezing. 11/30/17  Yes Charlotte Gonzalez MD  
finasteride (PROSCAR) 5 mg tablet Take 5 mg by mouth daily. Yes Provider, Historical  
cholecalciferol (VITAMIN D3) 1,000 unit tablet Take  by mouth daily. Yes Provider, Historical  
tamsulosin (FLOMAX) 0.4 mg capsule Take 0.4 mg by mouth nightly. Yes Provider, Historical  
psyllium (METAMUCIL) powd Take  by mouth daily. Yes Provider, Historical  
ASMANEX TWISTHALER 220 mcg (60 doses) inhaler Inhale 2 puffs by mouth  every day Patient taking differently: Inhale 2 puffs by mouth  every day prn 10/19/15  Yes Charlotte Gonzalez MD  
Cetirizine (ZYRTEC) 10 mg cap Take 10 mg by mouth daily. Yes Provider, Historical  
MULTIVITAMINS (MULTIVITAMIN PO) Take  by mouth daily. 7/22/10  Yes Provider, Historical  
omeprazole (PRILOSEC) 40 mg capsule Take 40 mg by mouth daily.     Provider, Historical  
 
 
 Allergies Allergen Reactions  Melon Flavor Swelling Facial swelling with watermelon and cantelope REVIEW OF SYSTEMS: 
Per HPI PHYSICAL EXAM: 
Visit Vitals /76 Pulse 80 Temp 97.7 °F (36.5 °C) (Oral) Resp 18 Ht 5' 11\" (1.803 m) Wt 182 lb (82.6 kg) SpO2 95% BMI 25.38 kg/m² Constitutional: Appears well-developed and well-nourished. No distress. HENT:  
Head: Normocephalic and atraumatic. Eyes: No scleral icterus. Mouth: OP clear without lesions, no pharyngeal exudate Neck: no lad, no tm, supple Cardiovascular: Normal S1/S2, regular rhythm. No murmurs, rubs, or gallops. Pulmonary/Chest: Effort normal and breath sounds normal. No respiratory distress. No wheezes, rhonchi, or rales. Abdomen: Soft, NT/ND, +BS, no rebound or guarding, no masses, no HSM appreciated. Ext: No edema. Neurological: Alert. Psychiatric: Normal mood and affect. Behavior is normal. 
  
Lab Results Component Value Date/Time Sodium 142 05/31/2018 12:21 PM  
 Potassium 4.4 05/31/2018 12:21 PM  
 Chloride 103 05/31/2018 12:21 PM  
 CO2 28 05/31/2018 12:21 PM  
 Anion gap 8 10/24/2015 09:05 PM  
 Glucose 99 05/31/2018 12:21 PM  
 BUN 13 05/31/2018 12:21 PM  
 Creatinine 0.84 05/31/2018 12:21 PM  
 BUN/Creatinine ratio 15 05/31/2018 12:21 PM  
 GFR est AA 99 05/31/2018 12:21 PM  
 GFR est non-AA 86 05/31/2018 12:21 PM  
 Calcium 9.3 05/31/2018 12:21 PM  
 Bilirubin, total 0.4 05/31/2018 12:21 PM  
 AST (SGOT) 26 05/31/2018 12:21 PM  
 Alk. phosphatase 51 05/31/2018 12:21 PM  
 Protein, total 6.2 05/31/2018 12:21 PM  
 Albumin 4.4 05/31/2018 12:21 PM  
 Globulin 2.6 10/24/2015 09:05 PM  
 A-G Ratio 2.4 (H) 05/31/2018 12:21 PM  
 ALT (SGPT) 26 05/31/2018 12:21 PM  
 
Lab Results Component Value Date/Time Hemoglobin A1c 5.8 (H) 11/30/2017 12:23 PM  
 Hemoglobin A1c 6.2 (H) 05/30/2017 08:57 AM  
 Hemoglobin A1c 6.1 (H) 11/28/2016 08:57 AM  
  
Lab Results Component Value Date/Time Cholesterol, total 170 05/31/2018 12:21 PM  
 HDL Cholesterol 53 05/31/2018 12:21 PM  
 LDL, calculated 101 (H) 05/31/2018 12:21 PM  
 VLDL, calculated 16 05/31/2018 12:21 PM  
 Triglyceride 81 05/31/2018 12:21 PM  
 CHOL/HDL Ratio 3.5 07/29/2010 09:06 AM  
  
 
 
ASSESSMENT/PLAN Diagnoses and all orders for this visit: 
 
1. IFG (impaired fasting glucose) 
-     HEMOGLOBIN A1C WITH EAG Check labs - continue exercise 2. Encounter for immunization 
-     INFLUENZA VACCINE INACTIVATED (IIV), SUBUNIT, ADJUVANTED, IM 
-     ADMIN INFLUENZA VIRUS VAC 3. Moderate persistent asthma without complication Controlled on current regimen, continue 4. Dyslipidemia -     METABOLIC PANEL, COMPREHENSIVE 
-     LIPID PANEL Not on meds - check lipids 5. Gastroesophageal reflux disease with esophagitis Followed by Dr. Xiomy King - symptoms well controlled with pantoprazole 6. Personal history of colonic polyps Up to date on colonoscopy - none last colonoscopy in 2016 - repeat in 2021 Health Maintenance Due Topic Date Due  Shingrix Vaccine Age 50> (1 of 2) 07/13/1992  GLAUCOMA SCREENING Q2Y  08/28/2017  Influenza Age 5 to Adult  08/01/2018 Follow-up Disposition: 
Return in about 6 months (around 5/14/2019) for chol, ifg, AWV. Reviewed plan of care. Patient has provided input and agrees with goals. The nurse provided the patient and/or family with advanced directive information if needed and encouraged the patient to provide a copy to the office when available.

## 2018-11-14 NOTE — LETTER
11/15/2018 5:15 PM 
 
[facility] [id number] Dear Jeanette Rowe: 
 
Please find your most recent results below. Resulted Orders METABOLIC PANEL, COMPREHENSIVE Result Value Ref Range Glucose 90 65 - 99 mg/dL BUN 15 8 - 27 mg/dL Creatinine 0.80 0.76 - 1.27 mg/dL GFR est non-AA 87 >59 mL/min/1.73 GFR est  >59 mL/min/1.73  
 BUN/Creatinine ratio 19 10 - 24 Sodium 141 134 - 144 mmol/L Potassium 4.5 3.5 - 5.2 mmol/L Chloride 101 96 - 106 mmol/L  
 CO2 28 20 - 29 mmol/L Calcium 9.2 8.6 - 10.2 mg/dL Protein, total 6.7 6.0 - 8.5 g/dL Albumin 4.7 3.5 - 4.8 g/dL GLOBULIN, TOTAL 2.0 1.5 - 4.5 g/dL A-G Ratio 2.4 (H) 1.2 - 2.2 Bilirubin, total 0.3 0.0 - 1.2 mg/dL Alk. phosphatase 58 39 - 117 IU/L  
 AST (SGOT) 23 0 - 40 IU/L  
 ALT (SGPT) 22 0 - 44 IU/L Narrative Performed at:  [address]  [id number] : Lalita Arroyo MD, Phone:  [phone] LIPID PANEL Result Value Ref Range Cholesterol, total 196 100 - 199 mg/dL Triglyceride 95 0 - 149 mg/dL HDL Cholesterol 55 >39 mg/dL VLDL, calculated 19 5 - 40 mg/dL LDL, calculated 122 (H) 0 - 99 mg/dL Narrative Performed at:  [address]  [id number] : Lalita Arroyo MD, Phone:  [phone] HEMOGLOBIN A1C WITH EAG Result Value Ref Range Hemoglobin A1c 5.8 (H) 4.8 - 5.6 % Comment:  
            Prediabetes: 5.7 - 6.4 Diabetes: >6.4 Glycemic control for adults with diabetes: <7.0 Estimated average glucose 120 mg/dL Narrative Performed at:  [address]  [id number] : Lalita Arroyo MD, Phone:  [phone] RECOMMENDATIONS: 
None. Keep up the good work! Please call me if you have any questions: [phone] Sincerely, 
 
 
 Chrissy Cooper MD

## 2018-11-14 NOTE — PROGRESS NOTES
Scottie Russell  Identified pt with two pt identifiers(name and ). Chief Complaint Patient presents with  Blood Pressure Check  Cholesterol Problem  Asthma  Immunization/Injection Reviewed record In preparation for visit and have obtained necessary documentation. Has info on advanced directive but has not filled them out. 1. Have you been to the ER, urgent care clinic or hospitalized since your last visit? No  
 
2. Have you seen or consulted any other health care providers outside of the [address] [place] [address] since your last visit? Include any pap smears or colon screening. No 
 
Vitals reviewed with provider. Health Maintenance reviewed:  
Eye exam last year with Dr. Daniela Arce After verbal order read back of , patient received High Dose Flu Shot (Adjuvanted Fluad) in left arm. [address] [address]40-343-29 Lot [id number] Exp 19 Patient tolerated procedure without complaints and received VIS. Health Maintenance Due Topic  Shingrix Vaccine Age 50> (1 of 2)  GLAUCOMA SCREENING Q2Y  Influenza Age 5 to Adult Wt Readings from Last 3 Encounters:  
18 182 lb (82.6 kg) 18 181 lb 9.6 oz (82.4 kg) 18 182 lb 3.2 oz (82.6 kg) Temp Readings from Last 3 Encounters:  
18 97.7 °F (36.5 °C) (Oral) 18 98.2 °F (36.8 °C) (Oral) 18 97.8 °F (36.6 °C) (Oral) BP Readings from Last 3 Encounters:  
18 133/76  
18 138/70  
18 116/70 Pulse Readings from Last 3 Encounters:  
18 80  
18 83  
18 66 Vitals:  
 18 5919 18 9355 BP: (!) 166/98 133/76 Pulse: 80 Resp: 18 Temp: 97.7 °F (36.5 °C) TempSrc: Oral   
SpO2: 95% Weight: 182 lb (82.6 kg) Height: 5' 11\" (1.803 m) PainSc:   2 PainLoc: Abdomen Learning Assessment:  
:  
 
 
Learning Assessment 11/10/2014 PRIMARY LEARNER Patient HIGHEST LEVEL OF EDUCATION - PRIMARY LEARNER  GRADUATED HIGH SCHOOL OR GED  
BARRIERS PRIMARY LEARNER NONE  
  NONE  
CO-LEARNER CAREGIVER No  
CO-LEARNER NAME none PRIMARY LANGUAGE ENGLISH  
LEARNER PREFERENCE PRIMARY DEMONSTRATION  
ANSWERED BY patients RELATIONSHIP SELF Depression Screening:  
:  
 
 
PHQ over the last two weeks 7/30/2018 Little interest or pleasure in doing things Not at all Feeling down, depressed, irritable, or hopeless Not at all Total Score PHQ 2 0 Fall Risk Assessment:  
:  
 
 
Fall Risk Assessment, last 12 mths 7/30/2018 Able to walk? Yes Fall in past 12 months? No  
Fall with injury? -  
  
 
Abuse Screening:  
:  
 
 
Abuse Screening Questionnaire 5/31/2018 11/28/2016 11/10/2014 Do you ever feel afraid of your partner? N N N Are you in a relationship with someone who physically or mentally threatens you? N N N Is it safe for you to go home? Lalo Rivas  
  
 
ADL Screening:  
:  
 
 

## 2018-11-14 NOTE — PATIENT INSTRUCTIONS
Vaccine Information Statement Influenza (Flu) Vaccine (Inactivated or Recombinant): What you need to know Many Vaccine Information Statements are available in Wolof and other languages. See www.immunize.org/vis Hojas de Información Sobre Vacunas están disponibles en Español y en muchos otros idiomas. Visite www.immunize.org/vis 1. Why get vaccinated? Influenza (flu) is a contagious disease that spreads around the [place] every year, usually between October and May. Flu is caused by influenza viruses, and is spread mainly by coughing, sneezing, and close contact. Anyone can get flu. Flu strikes suddenly and can last several days. Symptoms vary by age, but can include: 
 fever/chills  sore throat  muscle aches  fatigue  cough  headache  runny or stuffy nose Flu can also lead to pneumonia and blood infections, and cause diarrhea and seizures in children. If you have a medical condition, such as heart or lung disease, flu can make it worse. Flu is more dangerous for some people. Infants and young children, people 72years of age and older, pregnant women, and people with certain health conditions or a weakened immune system are at greatest risk. Each year thousands of people in the [facility] die from flu, and many more are hospitalized. Flu vaccine can: 
 keep you from getting flu, 
 make flu less severe if you do get it, and 
 keep you from spreading flu to your family and other people. 2. Inactivated and recombinant flu vaccines A dose of flu vaccine is recommended every flu season. Children 6 months through 6years of age may need two doses during the same flu season. Everyone else needs only one dose each flu season.   
 
 
Some inactivated flu vaccines contain a very small amount of a mercury-based preservative called thimerosal. Studies have not shown thimerosal in vaccines to be harmful, but flu vaccines that do not contain thimerosal are available. There is no live flu virus in flu shots. They cannot cause the flu. There are many flu viruses, and they are always changing. Each year a new flu vaccine is made to protect against three or four viruses that are likely to cause disease in the upcoming flu season. But even when the vaccine doesnt exactly match these viruses, it may still provide some protection Flu vaccine cannot prevent: 
 flu that is caused by a virus not covered by the vaccine, or 
 illnesses that look like flu but are not. It takes about 2 weeks for protection to develop after vaccination, and protection lasts through the flu season. 3. Some people should not get this vaccine Tell the person who is giving you the vaccine:  If you have any severe, life-threatening allergies. If you ever had a life-threatening allergic reaction after a dose of flu vaccine, or have a severe allergy to any part of this vaccine, you may be advised not to get vaccinated. Most, but not all, types of flu vaccine contain a small amount of egg protein.  If you ever had Guillain-Barré Syndrome (also called GBS). Some people with a history of GBS should not get this vaccine. This should be discussed with your doctor.  If you are not feeling well. It is usually okay to get flu vaccine when you have a mild illness, but you might be asked to come back when you feel better. 4. Risks of a vaccine reaction With any medicine, including vaccines, there is a chance of reactions. These are usually mild and go away on their own, but serious reactions are also possible. Most people who get a flu shot do not have any problems with it. Minor problems following a flu shot include:  
 soreness, redness, or swelling where the shot was given  hoarseness  sore, red or itchy eyes  cough  fever  aches  headache  itching  fatigue If these problems occur, they usually begin soon after the shot and last 1 or 2 days. More serious problems following a flu shot can include the following:  There may be a small increased risk of Guillain-Barré Syndrome (GBS) after inactivated flu vaccine. This risk has been estimated at 1 or 2 additional cases per million people vaccinated. This is much lower than the risk of severe complications from flu, which can be prevented by flu vaccine.  Young children who get the flu shot along with pneumococcal vaccine (PCV13) and/or DTaP vaccine at the same time might be slightly more likely to have a seizure caused by fever. Ask your doctor for more information. Tell your doctor if a child who is getting flu vaccine has ever had a seizure. Problems that could happen after any injected vaccine:  People sometimes faint after a medical procedure, including vaccination. Sitting or lying down for about 15 minutes can help prevent fainting, and injuries caused by a fall. Tell your doctor if you feel dizzy, or have vision changes or ringing in the ears.  Some people get severe pain in the shoulder and have difficulty moving the arm where a shot was given. This happens very rarely.  Any medication can cause a severe allergic reaction. Such reactions from a vaccine are very rare, estimated at about 1 in a million doses, and would happen within a few minutes to a few hours after the vaccination. As with any medicine, there is a very remote chance of a vaccine causing a serious injury or death. The safety of vaccines is always being monitored. For more information, visit: www.cdc.gov/vaccinesafety/ 
 
5. What if there is a serious reaction? What should I look for?  Look for anything that concerns you, such as signs of a severe allergic reaction, very high fever, or unusual behavior.  
 
Signs of a severe allergic reaction can include hives, swelling of the face and throat, difficulty breathing, a fast heartbeat, dizziness, and weakness  usually within a few minutes to a few hours after the vaccination. What should I do?  If you think it is a severe allergic reaction or other emergency that cant wait, call 9-1-1 and get the person to the nearest hospital. Otherwise, call your doctor.  Reactions should be reported to the Vaccine Adverse Event Reporting System (VAERS). Your doctor should file this report, or you can do it yourself through  the VAERS web site at www.vaers. [facility].gov, or by calling 7-490.808.8977. VAERS does not give medical advice. 6. The National Vaccine Injury Compensation Program 
 
The [facility] Vaccine Injury Compensation Program (VICP) is a federal program that was created to compensate people who may have been injured by certain vaccines. Persons who believe they may have been injured by a vaccine can learn about the program and about filing a claim by calling 0-520.696.7420 or visiting the [employer] website at www.[facility].gov/vaccinecompensation. There is a time limit to file a claim for compensation. 7. How can I learn more?  Ask your healthcare provider. He or she can give you the vaccine package insert or suggest other sources of information.  Call your local or state health department.  Contact the Centers for Disease Control and Prevention (CDC): 
- Call 2-907.283.7831 (1-800-CDC-INFO) or 
- Visit CDCs website at www.cdc.gov/flu Vaccine Information Statement Inactivated Influenza Vaccine 8/7/2015 
42 [facility] [id number]-[id number] Department of Health and [employer] Centers for Disease Control and Prevention Office Use Only

## 2018-11-15 LAB
ALBUMIN SERPL-MCNC: 4.7 G/DL (ref 3.5–4.8)
ALBUMIN/GLOB SERPL: 2.4 {RATIO} (ref 1.2–2.2)
ALP SERPL-CCNC: 58 IU/L (ref 39–117)
ALT SERPL-CCNC: 22 IU/L (ref 0–44)
AST SERPL-CCNC: 23 IU/L (ref 0–40)
BILIRUB SERPL-MCNC: 0.3 MG/DL (ref 0–1.2)
BUN SERPL-MCNC: 15 MG/DL (ref 8–27)
BUN/CREAT SERPL: 19 (ref 10–24)
CALCIUM SERPL-MCNC: 9.2 MG/DL (ref 8.6–10.2)
CHLORIDE SERPL-SCNC: 101 MMOL/L (ref 96–106)
CHOLEST SERPL-MCNC: 196 MG/DL (ref 100–199)
CO2 SERPL-SCNC: 28 MMOL/L (ref 20–29)
CREAT SERPL-MCNC: 0.8 MG/DL (ref 0.76–1.27)
EST. AVERAGE GLUCOSE BLD GHB EST-MCNC: 120 MG/DL
GLOBULIN SER CALC-MCNC: 2 G/DL (ref 1.5–4.5)
GLUCOSE SERPL-MCNC: 90 MG/DL (ref 65–99)
HBA1C MFR BLD: 5.8 % (ref 4.8–5.6)
HDLC SERPL-MCNC: 55 MG/DL
LDLC SERPL CALC-MCNC: 122 MG/DL (ref 0–99)
POTASSIUM SERPL-SCNC: 4.5 MMOL/L (ref 3.5–5.2)
PROT SERPL-MCNC: 6.7 G/DL (ref 6–8.5)
SODIUM SERPL-SCNC: 141 MMOL/L (ref 134–144)
TRIGL SERPL-MCNC: 95 MG/DL (ref 0–149)
VLDLC SERPL CALC-MCNC: 19 MG/DL (ref 5–40)

## 2018-11-28 RX ORDER — PANTOPRAZOLE SODIUM 20 MG/1
20 TABLET, DELAYED RELEASE ORAL DAILY
Qty: 90 TAB | Refills: 3 | Status: SHIPPED | OUTPATIENT
Start: 2018-11-28 | End: 2019-11-18 | Stop reason: SDUPTHER

## 2018-11-28 NOTE — TELEPHONE ENCOUNTER
----- Message from Karina Estevez sent at 11/28/2018  8:46 AM EST -----  Regarding: Dr. Yazmin Whaley  Pt requesting refill of Rx \"Pantoprazole 40 mg\" sent to [facility]. Best contact [phone].

## 2019-02-10 NOTE — PROGRESS NOTES
HPI  Kit Maurice is a 70y.o. year old male patient of Billy Mccormick MD who presents with c/o UC f/u for HTN. Pt has history of has GERD (gastroesophageal reflux disease), BPH (benign prostatic hyperplasia), Asthma, Dyslipidemia, Personal history of colonic polyps, Degenerative arthritis of lumbar spine, H. pylori infection, Allergic rhinosinusitis, Chronic mastoiditis, Dizziness, Advance care planning, and Prediabetes on their problem list..    Pt seen in ED on 1-27 for dizziness that occurred while working outside, /87, started on HCTZ 12.5mg in ED, told likely dehydrated. Has been on chlorthalidone in the past but was able to come off of it bc BP was low, had started an exercise program.  States normally a little high at doctor's office. Denies CP, palpitations, SOB, or MORAN. Denies lightheadedness or dizziness or blurred vision. Walks 2 miles 5 times a week when weather cooperates.        Patient Active Problem List   Diagnosis Code    GERD (gastroesophageal reflux disease) K21.9    BPH (benign prostatic hyperplasia) N40.0    Asthma J45.909    Dyslipidemia E78.5    Personal history of colonic polyps Z86.010    Degenerative arthritis of lumbar spine M47.816    H. pylori infection A04.8    Allergic rhinosinusitis J30.9    Chronic mastoiditis H70.10    Dizziness R42    Advance care planning Z71.89    Prediabetes R73.03     Past Medical History:   Diagnosis Date    Asthma     BPH     Elevated PSA     Peyronie disease      Past Surgical History:   Procedure Laterality Date    ABDOMEN SURGERY PROC UNLISTED      R inguinal hernia repair    HX CYST REMOVAL      cyst removal back    HX GI      colon polyp 1998; colonoscopy - needs every 5 yrs - 3/09 (-); repeat 3/2014 - Dr. Gong Apt    HX HEENT      L ear surgery - tympanomastoidectomy (revision 1st surgery 1973 fo \"hole in TM\"; s\p choleostoma; b/l cataract surgery - followed at [address] [place] [address] History    Marital status:      Spouse name: Not on file    Number of children: Not on file    Years of education: Not on file    Highest education level: Not on file   Tobacco Use    Smoking status: Former Smoker     Last attempt to quit: 1970     Years since quittin.1    Smokeless tobacco: Never Used    Tobacco comment: started age [age] - <1ppd stopped    Substance and Sexual Activity    Alcohol use: Yes    Drug use: No    Sexual activity: Yes     Partners: Female     Family History   Problem Relation Age of Onset    Cancer Mother         gall baldder    Kidney Disease Sister      Allergies   Allergen Reactions    Melon Flavor Swelling     Facial swelling with watermelon and cantelope       MEDICATIONS  Current Outpatient Medications   Medication Sig    pantoprazole (PROTONIX) 20 mg tablet Take 1 Tab by mouth daily.  sertraline (ZOLOFT) 50 mg tablet Take  by mouth daily.  albuterol (PROVENTIL HFA, VENTOLIN HFA, PROAIR HFA) 90 mcg/actuation inhaler Take 1 Puff by inhalation every four (4) hours as needed for Wheezing.  finasteride (PROSCAR) 5 mg tablet Take 5 mg by mouth daily.  cholecalciferol (VITAMIN D3) 1,000 unit tablet Take  by mouth daily.  tamsulosin (FLOMAX) 0.4 mg capsule Take 0.4 mg by mouth nightly.  psyllium (METAMUCIL) powd Take  by mouth daily.  ASMANEX TWISTHALER 220 mcg (60 doses) inhaler Inhale 2 puffs by mouth  every day (Patient taking differently: Inhale 2 puffs by mouth  every day prn)    Cetirizine (ZYRTEC) 10 mg cap Take 10 mg by mouth daily.  MULTIVITAMINS (MULTIVITAMIN PO) Take  by mouth daily. No current facility-administered medications for this visit. REVIEW OF SYSTEMS  Per HPI        Visit Vitals  /68   Pulse 80   Temp 97.6 °F (36.4 °C) (Oral)   Resp 18   Ht 5' 11\" (1.803 m)   Wt 187 lb 6.4 oz (85 kg)   SpO2 94%   BMI 26.14 kg/m²       General: Well-developed, well-nourished. In no distress. A&O x 3.   Head: Normocephalic, atraumatic. Eyes: Conjunctiva clear. Lungs: Clear to auscultation bilaterally. No crackles or wheezes. No use of accessory muscles. Speaks in full sentences without SOB. Chest Wall: No tenderness or deformity. Heart: RRR, normal S1 and S2, no murmur, click, rub, or gallop. Skin: No rashes or lesions. Neurovasc: No edema appreciated. Dorsalis pedis pulses are 2+ on the right side, and 2+ on the left side. Posterior tibial pulses are 2+ on the right side, and 2+ on the left side. Musculoskeletal: Gait normal.   Psychiatric: Normal mood and affect. Behavior is normal.       ASSESSMENT and PLAN  Diagnoses and all orders for this visit:    1. Essential hypertension  -     hydroCHLOROthiazide (HYDRODIURIL) 12.5 mg tablet; take 1 tablet by mouth once daily  Controlled on new regimen, continue. Reviewed common ADR. Check BMP at next appt. Monitor home BP as below. Patient Instructions     Continue to check blood pressures at home once every few days after sitting quietly for 15 minutes. Notify us if it is consistently over 140/90 or below 110/60. If we have not checked your blood pressure device in the past 2 years, please bring it at your next visit to be sure it correlates with ours. High Blood Pressure: Care Instructions  Overview    It's normal for blood pressure to go up and down throughout the day. But if it stays up, you have high blood pressure. Another name for high blood pressure is hypertension. Despite what a lot of people think, high blood pressure usually doesn't cause headaches or make you feel dizzy or lightheaded. It usually has no symptoms. But it does increase your risk of stroke, heart attack, and other problems. You and your doctor will talk about your risks of these problems based on your blood pressure. Your doctor will give you a goal for your blood pressure. Your goal will be based on your health and your age.   Lifestyle changes, such as eating healthy and being active, are always important to help lower blood pressure. You might also take medicine to reach your blood pressure goal.  Follow-up care is a key part of your treatment and safety. Be sure to make and go to all appointments, and call your doctor if you are having problems. It's also a good idea to know your test results and keep a list of the medicines you take. How can you care for yourself at home? Medical treatment  · If you stop taking your medicine, your blood pressure will go back up. You may take one or more types of medicine to lower your blood pressure. Be safe with medicines. Take your medicine exactly as prescribed. Call your doctor if you think you are having a problem with your medicine. · Talk to your doctor before you start taking aspirin every day. Aspirin can help certain people lower their risk of a heart attack or stroke. But taking aspirin isn't right for everyone, because it can cause serious bleeding. · See your doctor regularly. You may need to see the doctor more often at first or until your blood pressure comes down. · If you are taking blood pressure medicine, talk to your doctor before you take decongestants or anti-inflammatory medicine, such as ibuprofen. Some of these medicines can raise blood pressure. · Learn how to check your blood pressure at home. Lifestyle changes  · Stay at a healthy weight. This is especially important if you put on weight around the waist. Losing even 10 pounds can help you lower your blood pressure. · If your doctor recommends it, get more exercise. Walking is a good choice. Bit by bit, increase the amount you walk every day. Try for at least 30 minutes on most days of the week. You also may want to swim, bike, or do other activities. · Avoid or limit alcohol. Talk to your doctor about whether you can drink any alcohol. · Try to limit how much sodium you eat to less than 2,300 milligrams (mg) a day.  Your doctor may ask you to try to eat less than 1,500 mg a day. · Eat plenty of fruits (such as bananas and oranges), vegetables, legumes, whole grains, and low-fat dairy products. · Lower the amount of saturated fat in your diet. Saturated fat is found in animal products such as milk, cheese, and meat. Limiting these foods may help you lose weight and also lower your risk for heart disease. · Do not smoke. Smoking increases your risk for heart attack and stroke. If you need help quitting, talk to your doctor about stop-smoking programs and medicines. These can increase your chances of quitting for good. When should you call for help? Call 911 anytime you think you may need emergency care. This may mean having symptoms that suggest that your blood pressure is causing a serious heart or blood vessel problem. Your blood pressure may be over 180/120.   For example, call 911 if:    · You have symptoms of a heart attack. These may include:  ? Chest pain or pressure, or a strange feeling in the chest.  ? Sweating. ? Shortness of breath. ? Nausea or vomiting. ? Pain, pressure, or a strange feeling in the back, neck, jaw, or upper belly or in one or both shoulders or arms. ? Lightheadedness or sudden weakness. ? A fast or irregular heartbeat.     · You have symptoms of a stroke. These may include:  ? Sudden numbness, tingling, weakness, or loss of movement in your face, arm, or leg, especially on only one side of your body. ? Sudden vision changes. ? Sudden trouble speaking. ? Sudden confusion or trouble understanding simple statements. ? Sudden problems with walking or balance. ? A sudden, severe headache that is different from past headaches.     · You have severe back or belly pain.    Do not wait until your blood pressure comes down on its own. Get help right away.   Call your doctor now or seek immediate care if:    · Your blood pressure is much higher than normal (such as 180/120 or higher), but you don't have symptoms.   · You think high blood pressure is causing symptoms, such as:  ? Severe headache.  ? Blurry vision.    Watch closely for changes in your health, and be sure to contact your doctor if:    · Your blood pressure measures higher than your doctor recommends at least 2 times. That means the top number is higher or the bottom number is higher, or both.     · You think you may be having side effects from your blood pressure medicine. Where can you learn more? Go to http://sajan-jos.info/. Enter C398 in the search box to learn more about \"High Blood Pressure: Care Instructions. \"  Current as of: July 22, 2018  Content Version: 11.9  © 1478-3022 [employer]. Care instructions adapted under license by [employer] (which disclaims liability or warranty for this information). If you have questions about a medical condition or this instruction, always ask your healthcare professional. [place] [address] any warranty or liability for your use of this information. Please keep your follow-up appointment with Gloria Graham MD.     Follow-up Disposition:  Return in about 1 month (around 3/11/2019), or if symptoms worsen or fail to improve. Health Maintenance Due   Topic Date Due    Shingrix Vaccine Age 49> (1 of 2) 07/13/1992    GLAUCOMA SCREENING Q2Y  08/28/2017       I have discussed the diagnosis with the patient and the intended plan as seen in the above orders. Patient is in agreement. The patient has received an after-visit summary and questions were answered concerning future plans. I have discussed medication side effects and warnings with the patient as well. Warning signs for the above conditions were discussed including when to call our office or go to the emergency room.      The nurse provided the patient and/or family with advanced directive information if needed and encouraged the patient to provide a copy to the office when available.

## 2019-02-11 ENCOUNTER — OFFICE VISIT (OUTPATIENT)
Dept: INTERNAL MEDICINE CLINIC | Facility: CLINIC | Age: 79
End: 2019-02-11

## 2019-02-11 VITALS
HEIGHT: 71 IN | OXYGEN SATURATION: 94 % | DIASTOLIC BLOOD PRESSURE: 68 MMHG | HEART RATE: 80 BPM | SYSTOLIC BLOOD PRESSURE: 120 MMHG | TEMPERATURE: 97.6 F | WEIGHT: 187.4 LBS | RESPIRATION RATE: 18 BRPM | BODY MASS INDEX: 26.23 KG/M2

## 2019-02-11 DIAGNOSIS — I10 ESSENTIAL HYPERTENSION: Primary | ICD-10-CM

## 2019-02-11 RX ORDER — HYDROCHLOROTHIAZIDE 12.5 MG/1
TABLET ORAL
Refills: 0 | COMMUNITY
Start: 2019-01-28 | End: 2019-02-11 | Stop reason: SDUPTHER

## 2019-02-11 RX ORDER — HYDROCHLOROTHIAZIDE 12.5 MG/1
TABLET ORAL
Qty: 90 TAB | Refills: 1 | Status: SHIPPED | OUTPATIENT
Start: 2019-02-11 | End: 2020-04-16

## 2019-02-11 NOTE — PROGRESS NOTES
Gideon Trevino  Identified pt with two pt identifiers(name and ). Chief Complaint   Patient presents with   [facility] ED Follow-up     [facility]  HTN       Reviewed record In preparation for visit and have obtained necessary documentation. Has info on advanced directive but has not filled them out. 1. Have you been to the ER, urgent care clinic or hospitalized since your last visit? No     2. Have you seen or consulted any other health care providers outside of the [address] [place] [address] since your last visit? Include any pap smears or colon screening. No    Vitals reviewed with provider.     Health Maintenance reviewed:     Health Maintenance Due   Topic    Shingrix Vaccine Age 49> (1 of 2)    GLAUCOMA SCREENING Q2Y           Wt Readings from Last 3 Encounters:   19 187 lb 6.4 oz (85 kg)   18 182 lb (82.6 kg)   18 181 lb 9.6 oz (82.4 kg)        Temp Readings from Last 3 Encounters:   19 97.6 °F (36.4 °C) (Oral)   18 97.7 °F (36.5 °C) (Oral)   18 98.2 °F (36.8 °C) (Oral)        BP Readings from Last 3 Encounters:   19 138/69   18 133/76   18 138/70        Pulse Readings from Last 3 Encounters:   19 80   18 80   18 83        Vitals:    19 1032 19 1033   BP: 159/81 138/69   Pulse: 80    Resp: 18    Temp: 97.6 °F (36.4 °C)    TempSrc: Oral    SpO2: 94%    Weight: 187 lb 6.4 oz (85 kg)    Height: 5' 11\" (1.803 m)    PainSc:   0 - No pain           Learning Assessment:   :       Learning Assessment 11/10/2014   PRIMARY LEARNER Patient   HIGHEST LEVEL OF EDUCATION - PRIMARY LEARNER  GRADUATED HIGH SCHOOL OR GED   BARRIERS PRIMARY LEARNER NONE     NONE   CO-LEARNER CAREGIVER No   CO-LEARNER NAME none   PRIMARY LANGUAGE ENGLISH   LEARNER PREFERENCE PRIMARY DEMONSTRATION   ANSWERED BY patients   RELATIONSHIP SELF        Depression Screening:   :       PHQ over the last two weeks 2018   Little interest or pleasure in doing things Not at all   Feeling down, depressed, irritable, or hopeless Not at all   Total Score PHQ 2 0        Fall Risk Assessment:   :       Fall Risk Assessment, last 12 mths 7/30/2018   Able to walk? Yes   Fall in past 12 months? No   Fall with injury? -        Abuse Screening:   :       Abuse Screening Questionnaire 5/31/2018 11/28/2016 11/10/2014   Do you ever feel afraid of your partner? N N N   Are you in a relationship with someone who physically or mentally threatens you? N N N   Is it safe for you to go home?  Y Y Y        ADL Screening:   :       ADL Assessment 5/31/2018   Feeding yourself No Help Needed   Getting from bed to chair No Help Needed   Getting dressed No Help Needed   Bathing or showering No Help Needed   Walk across the room (includes cane/walker) No Help Needed   Using the telphone No Help Needed   Taking your medications No Help Needed   Preparing meals No Help Needed   Managing money (expenses/bills) No Help Needed   Moderately strenuous housework (laundry) No Help Needed   Shopping for personal items (toiletries/medicines) No Help Needed   Shopping for groceries No Help Needed   Driving No Help Needed   Climbing a flight of stairs No Help Needed   Getting to places beyond walking distances No Help Needed

## 2019-02-11 NOTE — PATIENT INSTRUCTIONS
Continue to check blood pressures at home once every few days after sitting quietly for 15 minutes. Notify us if it is consistently over 140/90 or below 110/60. If we have not checked your blood pressure device in the past 2 years, please bring it at your next visit to be sure it correlates with ours. High Blood Pressure: Care Instructions  Overview    It's normal for blood pressure to go up and down throughout the day. But if it stays up, you have high blood pressure. Another name for high blood pressure is hypertension. Despite what a lot of people think, high blood pressure usually doesn't cause headaches or make you feel dizzy or lightheaded. It usually has no symptoms. But it does increase your risk of stroke, heart attack, and other problems. You and your doctor will talk about your risks of these problems based on your blood pressure. Your doctor will give you a goal for your blood pressure. Your goal will be based on your health and your age. Lifestyle changes, such as eating healthy and being active, are always important to help lower blood pressure. You might also take medicine to reach your blood pressure goal.  Follow-up care is a key part of your treatment and safety. Be sure to make and go to all appointments, and call your doctor if you are having problems. It's also a good idea to know your test results and keep a list of the medicines you take. How can you care for yourself at home? Medical treatment  · If you stop taking your medicine, your blood pressure will go back up. You may take one or more types of medicine to lower your blood pressure. Be safe with medicines. Take your medicine exactly as prescribed. Call your doctor if you think you are having a problem with your medicine. · Talk to your doctor before you start taking aspirin every day. Aspirin can help certain people lower their risk of a heart attack or stroke.  But taking aspirin isn't right for everyone, because it can cause serious bleeding. · See your doctor regularly. You may need to see the doctor more often at first or until your blood pressure comes down. · If you are taking blood pressure medicine, talk to your doctor before you take decongestants or anti-inflammatory medicine, such as ibuprofen. Some of these medicines can raise blood pressure. · Learn how to check your blood pressure at home. Lifestyle changes  · Stay at a healthy weight. This is especially important if you put on weight around the waist. Losing even 10 pounds can help you lower your blood pressure. · If your doctor recommends it, get more exercise. Walking is a good choice. Bit by bit, increase the amount you walk every day. Try for at least 30 minutes on most days of the week. You also may want to swim, bike, or do other activities. · Avoid or limit alcohol. Talk to your doctor about whether you can drink any alcohol. · Try to limit how much sodium you eat to less than 2,300 milligrams (mg) a day. Your doctor may ask you to try to eat less than 1,500 mg a day. · Eat plenty of fruits (such as bananas and oranges), vegetables, legumes, whole grains, and low-fat dairy products. · Lower the amount of saturated fat in your diet. Saturated fat is found in animal products such as milk, cheese, and meat. Limiting these foods may help you lose weight and also lower your risk for heart disease. · Do not smoke. Smoking increases your risk for heart attack and stroke. If you need help quitting, talk to your doctor about stop-smoking programs and medicines. These can increase your chances of quitting for good. When should you call for help? Call 911 anytime you think you may need emergency care. This may mean having symptoms that suggest that your blood pressure is causing a serious heart or blood vessel problem. Your blood pressure may be over 180/120.   For example, call 911 if:    · You have symptoms of a heart attack. These may include:  ?  Chest pain or pressure, or a strange feeling in the chest.  ? Sweating. ? Shortness of breath. ? Nausea or vomiting. ? Pain, pressure, or a strange feeling in the back, neck, jaw, or upper belly or in one or both shoulders or arms. ? Lightheadedness or sudden weakness. ? A fast or irregular heartbeat.     · You have symptoms of a stroke. These may include:  ? Sudden numbness, tingling, weakness, or loss of movement in your face, arm, or leg, especially on only one side of your body. ? Sudden vision changes. ? Sudden trouble speaking. ? Sudden confusion or trouble understanding simple statements. ? Sudden problems with walking or balance. ? A sudden, severe headache that is different from past headaches.     · You have severe back or belly pain.    Do not wait until your blood pressure comes down on its own. Get help right away.   Call your doctor now or seek immediate care if:    · Your blood pressure is much higher than normal (such as 180/120 or higher), but you don't have symptoms.     · You think high blood pressure is causing symptoms, such as:  ? Severe headache.  ? Blurry vision.    Watch closely for changes in your health, and be sure to contact your doctor if:    · Your blood pressure measures higher than your doctor recommends at least 2 times. That means the top number is higher or the bottom number is higher, or both.     · You think you may be having side effects from your blood pressure medicine. Where can you learn more? Go to http://sajan-jos.info/. Enter X296 in the search box to learn more about \"High Blood Pressure: Care Instructions. \"  Current as of: July 22, 2018  Content Version: 11.9  © 8774-3880 [employer]. Care instructions adapted under license by [employer] (which disclaims liability or warranty for this information).  If you have questions about a medical condition or this instruction, always ask your healthcare professional. [employer] disclaims any warranty or liability for your use of this information.

## 2019-04-01 RX ORDER — TAMSULOSIN HYDROCHLORIDE 0.4 MG/1
0.4 CAPSULE ORAL
Qty: 14 CAP | Refills: 0 | Status: SHIPPED | OUTPATIENT
Start: 2019-04-01

## 2019-04-01 NOTE — TELEPHONE ENCOUNTER
Pt ran out of the following medication and is waiting on his mail order from [address]. Pt would like to get a two week supply of the following medication sent to [facility] in [place]. tamsulosin (FLOMAX) 0.4 mg capsule  Taking  --  --  Provider, Historical     Take 0.4 mg by mouth nightly.

## 2019-05-13 NOTE — PROGRESS NOTES
HPI Mr. Selvin Alvarado is a 70y.o. year old male, he is seen today for IFG, high cholesterol, asthma, AWV. Has been well. Little cough occasionally, no wheezing, no sob, no chest pain. No HA. Had a spell of dizziness and went to  - says he relates to dehydration and felt better after drinking more water. BP was elevated and now taking hctz 12.5mg daily - feels well. Weight is down 8# in 3 mos. Normal weight by his scales is 175# without clothes - has lost about 2-3 # by his scales. Has been avoiding fried foods to help gerd symptoms. Prescribed zoloft by [place] physician, rarely takes, only if he feels down. Doesn't even take weekly. Says it helps. Not normally sad, down, depressed. No SI. Chief Complaint Patient presents with  Cholesterol Problem Room 2A// fasting // referral to optometry  Blood sugar problem [facility] Annual Wellness Visit Prior to Admission medications Medication Sig Start Date End Date Taking? Authorizing Provider  
tamsulosin (FLOMAX) 0.4 mg capsule Take 1 Cap by mouth nightly. 4/1/19  Yes Radha Victor NP  
hydroCHLOROthiazide (HYDRODIURIL) 12.5 mg tablet take 1 tablet by mouth once daily 2/11/19  Yes Radha Victor NP  
pantoprazole (PROTONIX) 20 mg tablet Take 1 Tab by mouth daily. 11/28/18  Yes Bianca Scherer MD  
sertraline (ZOLOFT) 50 mg tablet Take  by mouth daily. Yes Provider, Historical  
albuterol (PROVENTIL HFA, VENTOLIN HFA, PROAIR HFA) 90 mcg/actuation inhaler Take 1 Puff by inhalation every four (4) hours as needed for Wheezing. 11/30/17  Yes Bianca Scherer MD  
finasteride (PROSCAR) 5 mg tablet Take 5 mg by mouth daily. Yes Provider, Historical  
cholecalciferol (VITAMIN D3) 1,000 unit tablet Take  by mouth daily. Yes Provider, Historical  
psyllium (METAMUCIL) powd Take  by mouth daily. Yes Provider, Historical  
ASMANEX TWISTHALER 220 mcg (60 doses) inhaler Inhale 2 puffs by mouth  every day Patient taking differently: Inhale 2 puffs by mouth  every day prn 10/19/15  Yes Bonnie Denny MD  
Cetirizine (ZYRTEC) 10 mg cap Take 10 mg by mouth daily. Yes Provider, Historical  
MULTIVITAMINS (MULTIVITAMIN PO) Take  by mouth daily. 7/22/10  Yes Provider, Historical  
 
 
 
Allergies Allergen Reactions  Melon Flavor Swelling Facial swelling with watermelon and cantelope REVIEW OF SYSTEMS: 
Per HPI PHYSICAL EXAM: 
Visit Vitals /70 Pulse 66 Temp 97.7 °F (36.5 °C) (Oral) Resp 14 Ht 5' 11\" (1.803 m) Wt 179 lb 3.2 oz (81.3 kg) SpO2 95% BMI 24.99 kg/m² Constitutional: Appears well-developed and well-nourished. No distress. HENT:  
Head: Normocephalic and atraumatic. Eyes: No scleral icterus. Neck: no lad, no tm, supple Cardiovascular: Normal S1/S2, regular rhythm. No murmurs, rubs, or gallops. Pulmonary/Chest: Effort normal and breath sounds normal. No respiratory distress. No wheezes, rhonchi, or rales. Abdomen: Soft, NT/ND, +BS, no rebound or guarding, no masses, no HSM appreciated. Back: nontender to palpation Ext: No edema. Neurological: Alert. Psychiatric: Normal mood and affect. Behavior is normal. 
  
Lab Results Component Value Date/Time Sodium 141 11/14/2018 09:03 AM  
 Potassium 4.5 11/14/2018 09:03 AM  
 Chloride 101 11/14/2018 09:03 AM  
 CO2 28 11/14/2018 09:03 AM  
 Anion gap 8 10/24/2015 09:05 PM  
 Glucose 90 11/14/2018 09:03 AM  
 BUN 15 11/14/2018 09:03 AM  
 Creatinine 0.80 11/14/2018 09:03 AM  
 BUN/Creatinine ratio 19 11/14/2018 09:03 AM  
 GFR est  11/14/2018 09:03 AM  
 GFR est non-AA 87 11/14/2018 09:03 AM  
 Calcium 9.2 11/14/2018 09:03 AM  
 Bilirubin, total 0.3 11/14/2018 09:03 AM  
 AST (SGOT) 23 11/14/2018 09:03 AM  
 Alk.  phosphatase 58 11/14/2018 09:03 AM  
 Protein, total 6.7 11/14/2018 09:03 AM  
 Albumin 4.7 11/14/2018 09:03 AM  
 Globulin 2.6 10/24/2015 09:05 PM  
 A-G Ratio 2.4 (H) 11/14/2018 09:03 AM  
 ALT (SGPT) 22 11/14/2018 09:03 AM  
 
Lab Results Component Value Date/Time Hemoglobin A1c 5.8 (H) 11/14/2018 09:03 AM  
 Hemoglobin A1c 5.8 (H) 11/30/2017 12:23 PM  
 Hemoglobin A1c 6.2 (H) 05/30/2017 08:57 AM  
  
Lab Results Component Value Date/Time Cholesterol, total 196 11/14/2018 09:03 AM  
 HDL Cholesterol 55 11/14/2018 09:03 AM  
 LDL, calculated 122 (H) 11/14/2018 09:03 AM  
 VLDL, calculated 19 11/14/2018 09:03 AM  
 Triglyceride 95 11/14/2018 09:03 AM  
 CHOL/HDL Ratio 3.5 07/29/2010 09:06 AM  
  
 
 
ASSESSMENT/PLAN Diagnoses and all orders for this visit: 
 
1. Medicare annual wellness visit, subsequent 2. Gastroesophageal reflux disease with esophagitis Controlled with diet changes, protonix. May cut back to every other day if able. 3. Benign prostatic hyperplasia, unspecified whether lower urinary tract symptoms present Followed by urology 4. Dyslipidemia -     METABOLIC PANEL, COMPREHENSIVE 
-     LIPID PANEL 5. Prediabetes 
-     HEMOGLOBIN A1C WITH EAG 6. Moderate persistent asthma without complication Controlled on current regimen, continue 7. Glaucoma screening 
-     REFERRAL TO OPTOMETRY 8. Advanced directives, counseling/discussion 9. Screening for depression 
-     Peggy 70 10. Essential hypertension Controlled on current regimen, continue  
 
 
rec taking zoloft 25mg daily if feeling down instead of 50mg prn Health Maintenance Due Topic Date Due  Pneumococcal 65+ years (2 of 2 - PCV13) 03/09/2016  GLAUCOMA SCREENING Q2Y  08/28/2017  MEDICARE YEARLY EXAM  06/01/2019 Follow-up and Dispositions · Return in about 6 months (around 11/14/2019) for asthma, ifg. 
  
  
 
 
Reviewed plan of care. Patient has provided input and agrees with goals.   
 
The nurse provided the patient and/or family with advanced directive information if needed and encouraged the patient to provide a copy to the office when available. This is the Subsequent Medicare Annual Wellness Exam, performed 12 months or more after the Initial AWV or the last Subsequent AWV I have reviewed the patient's medical history in detail and updated the computerized patient record. History Past Medical History:  
Diagnosis Date  Asthma  BPH  Elevated PSA  Peyronie disease Past Surgical History:  
Procedure Laterality Date  ABDOMEN SURGERY PROC UNLISTED    
 R inguinal hernia repair  HX CYST REMOVAL    
 cyst removal back  HX GI    
 colon polyp 1998; colonoscopy - needs every 5 yrs - 3/09 (-); repeat 3/2014 - Dr. Martha Michaels  HX HEENT    
 L ear surgery - tympanomastoidectomy (revision 1st surgery 1973 fo \"hole in TM\"; s\p choleostoma; b/l cataract surgery - followed at [facility]   
 
Current Outpatient Medications Medication Sig Dispense Refill  tamsulosin (FLOMAX) 0.4 mg capsule Take 1 Cap by mouth nightly. 14 Cap 0  
 hydroCHLOROthiazide (HYDRODIURIL) 12.5 mg tablet take 1 tablet by mouth once daily 90 Tab 1  
 pantoprazole (PROTONIX) 20 mg tablet Take 1 Tab by mouth daily. 90 Tab 3  
 sertraline (ZOLOFT) 50 mg tablet Take  by mouth daily.  albuterol (PROVENTIL HFA, VENTOLIN HFA, PROAIR HFA) 90 mcg/actuation inhaler Take 1 Puff by inhalation every four (4) hours as needed for Wheezing. 3 Inhaler 4  
 finasteride (PROSCAR) 5 mg tablet Take 5 mg by mouth daily.  cholecalciferol (VITAMIN D3) 1,000 unit tablet Take  by mouth daily.  psyllium (METAMUCIL) powd Take  by mouth daily.  ASMANEX TWISTHALER 220 mcg (60 doses) inhaler Inhale 2 puffs by mouth  every day (Patient taking differently: Inhale 2 puffs by mouth  every day prn) 3 Inhaler 4  
 Cetirizine (ZYRTEC) 10 mg cap Take 10 mg by mouth daily.  MULTIVITAMINS (MULTIVITAMIN PO) Take  by mouth daily. Allergies Allergen Reactions  Melon Flavor Swelling Facial swelling with watermelon and cantelope Family History Problem Relation Age of Onset  Cancer Mother   
     gall baldder  Kidney Disease Sister Social History Tobacco Use  Smoking status: Former Smoker Last attempt to quit: 1970 Years since quittin.3  Smokeless tobacco: Never Used  Tobacco comment: started age [age] - <1ppd stopped 5 Substance Use Topics  Alcohol use: Yes Patient Active Problem List  
Diagnosis Code  GERD (gastroesophageal reflux disease) K21.9  BPH (benign prostatic hyperplasia) N40.0  Asthma J45.909  Dyslipidemia E78.5  Personal history of colonic polyps Z86.010  
 Degenerative arthritis of lumbar spine M47.816  
 H. 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  
 Prediabetes R73.03  
 Essential hypertension I10 Depression Risk Factor Screening:  
 
3 most recent PHQ Screens 2019 Little interest or pleasure in doing things Not at all Feeling down, depressed, irritable, or hopeless Not at all Total Score PHQ 2 0 Alcohol Risk Factor Screening: You do not drink alcohol or very rarely. Functional Ability and Level of Safety:  
Hearing Loss Hearing is good. Activities of Daily Living The home contains: grab bars Patient does total self care Fall Risk Fall Risk Assessment, last 12 mths 2018 Able to walk? Yes Fall in past 12 months? No  
Fall with injury? -  
 
 
Abuse Screen Patient is not abused Cognitive Screening Evaluation of Cognitive Function: 
Has your family/caregiver stated any concerns about your memory: no 
Normal 
 
Patient Care Team  
Patient Care Team: 
Kasandra Dye MD as PCP - Polo Gonzalez MD (Cardiology) Bob Johnson MD as Physician (Ophthalmology) Assessment/Plan Education and counseling provided: 
Are appropriate based on today's review and evaluation Diagnoses and all orders for this visit: 
 
1. Medicare annual wellness visit, subsequent 2. Gastroesophageal reflux disease with esophagitis 3. Benign prostatic hyperplasia, unspecified whether lower urinary tract symptoms present 4. Dyslipidemia -     METABOLIC PANEL, COMPREHENSIVE 
-     LIPID PANEL 5. Prediabetes 
-     HEMOGLOBIN A1C WITH EAG 6. Moderate persistent asthma without complication 7. Glaucoma screening 
-     REFERRAL TO OPTOMETRY 8. Advanced directives, counseling/discussion 9. Screening for depression 
-     Peggy Jorge 10. Essential hypertension Health Maintenance Due Topic Date Due  Pneumococcal 65+ years (2 of 2 - PCV13) 03/09/2016  GLAUCOMA SCREENING Q2Y  08/28/2017  MEDICARE YEARLY EXAM  06/01/2019

## 2019-05-14 ENCOUNTER — OFFICE VISIT (OUTPATIENT)
Dept: INTERNAL MEDICINE CLINIC | Facility: CLINIC | Age: 79
End: 2019-05-14

## 2019-05-14 VITALS
HEART RATE: 66 BPM | OXYGEN SATURATION: 95 % | WEIGHT: 179.2 LBS | HEIGHT: 71 IN | DIASTOLIC BLOOD PRESSURE: 70 MMHG | TEMPERATURE: 97.7 F | BODY MASS INDEX: 25.09 KG/M2 | RESPIRATION RATE: 14 BRPM | SYSTOLIC BLOOD PRESSURE: 120 MMHG

## 2019-05-14 DIAGNOSIS — Z13.31 SCREENING FOR DEPRESSION: ICD-10-CM

## 2019-05-14 DIAGNOSIS — R73.03 PREDIABETES: ICD-10-CM

## 2019-05-14 DIAGNOSIS — J45.40 MODERATE PERSISTENT ASTHMA WITHOUT COMPLICATION: ICD-10-CM

## 2019-05-14 DIAGNOSIS — Z00.00 MEDICARE ANNUAL WELLNESS VISIT, SUBSEQUENT: Primary | ICD-10-CM

## 2019-05-14 DIAGNOSIS — K21.00 GASTROESOPHAGEAL REFLUX DISEASE WITH ESOPHAGITIS: ICD-10-CM

## 2019-05-14 DIAGNOSIS — I10 ESSENTIAL HYPERTENSION: ICD-10-CM

## 2019-05-14 DIAGNOSIS — N40.0 BENIGN PROSTATIC HYPERPLASIA, UNSPECIFIED WHETHER LOWER URINARY TRACT SYMPTOMS PRESENT: ICD-10-CM

## 2019-05-14 DIAGNOSIS — Z71.89 ADVANCED DIRECTIVES, COUNSELING/DISCUSSION: ICD-10-CM

## 2019-05-14 DIAGNOSIS — E78.5 DYSLIPIDEMIA: ICD-10-CM

## 2019-05-14 DIAGNOSIS — Z13.5 GLAUCOMA SCREENING: ICD-10-CM

## 2019-05-14 NOTE — ACP (ADVANCE CARE PLANNING)
Advance Care Planning    Advance Care Planning (ACP) Provider Conversation Snapshot    Date of ACP Conversation: 05/14/19  Persons included in Conversation:  patient  Length of ACP Conversation in minutes:  <16 minutes (Non-Billable)    Authorized Decision Maker (if patient is incapable of making informed decisions): This person is:   daughter Irish Friedman          For Patients with Decision Making Capacity:   Values/Goals: Exploration of values, goals, and preferences if recovery is not expected, even with continued medical treatment in the event of:  Imminent death  Severe, permanent brain injury  \"In these circumstances, what matters most to you? \"  Care focused more on comfort or quality of life.     Conversation Outcomes / Follow-Up Plan:   Recommended completion of Advance Directive form after review of ACP materials and conversation with prospective healthcare agent

## 2019-05-14 NOTE — PROGRESS NOTES
Kendrick Kaiser  Identified pt with two pt identifiers(name and ). Chief Complaint Patient presents with  Cholesterol Problem Room 2A//  Blood sugar problem [facility] Annual Wellness Visit 1. Have you been to the ER, urgent care clinic since your last visit? Hospitalized since your last visit? NO 
 
2. Have you seen or consulted any other health care providers outside of the [address] [place] [address] since your last visit? Include any pap smears or colon screening. NO Provider notified of reason for visit, vitals and flowsheets obtained on patients. Patient received paperwork for advance directive during previous visit but has not completed at this time Reviewed record In preparation for visit, huddled with provider and have obtained necessary documentation Health Maintenance Due Topic  Shingrix Vaccine Age 50> (1 of 2)  Pneumococcal 65+ years (2 of 2 - PCV13)  GLAUCOMA SCREENING Q2Y  MEDICARE YEARLY EXAM   
 
 
Wt Readings from Last 3 Encounters:  
19 187 lb 6.4 oz (85 kg)  
18 182 lb (82.6 kg) 18 181 lb 9.6 oz (82.4 kg) Temp Readings from Last 3 Encounters:  
19 97.6 °F (36.4 °C) (Oral)  
18 97.7 °F (36.5 °C) (Oral) 18 98.2 °F (36.8 °C) (Oral) BP Readings from Last 3 Encounters:  
19 120/68  
18 133/76  
18 138/70 Pulse Readings from Last 3 Encounters:  
19 80  
18 80  
18 83 Vitals:  
 19 0256 Resp: 14 Height: 5' 11\" (1.803 m) Learning Assessment: 
:  
 
Learning Assessment 11/10/2014 PRIMARY LEARNER Patient HIGHEST LEVEL OF EDUCATION - PRIMARY LEARNER  GRADUATED HIGH SCHOOL OR GED  
BARRIERS PRIMARY LEARNER NONE  
  NONE  
CO-LEARNER CAREGIVER No  
CO-LEARNER NAME none PRIMARY LANGUAGE ENGLISH  
LEARNER PREFERENCE PRIMARY DEMONSTRATION  
ANSWERED BY patients RELATIONSHIP SELF Depression Screening: 
:  
 
 3 most recent PHQ Screens 7/30/2018 Little interest or pleasure in doing things Not at all Feeling down, depressed, irritable, or hopeless Not at all Total Score PHQ 2 0 Fall Risk Assessment: 
:  
 
Fall Risk Assessment, last 12 mths 7/30/2018 Able to walk? Yes Fall in past 12 months? No  
Fall with injury? -  
 
 
Abuse Screening: 
:  
 
Abuse Screening Questionnaire 5/31/2018 11/28/2016 11/10/2014 Do you ever feel afraid of your partner? N N N Are you in a relationship with someone who physically or mentally threatens you? N N N Is it safe for you to go home? Su Saad  
 
 
ADL Screening: 
:  
 
ADL Assessment 5/31/2018 Feeding yourself No Help Needed Getting from bed to chair No Help Needed Getting dressed No Help Needed Bathing or showering No Help Needed Walk across the room (includes cane/walker) No Help Needed Using the telphone No Help Needed Taking your medications No Help Needed Preparing meals No Help Needed Managing money (expenses/bills) No Help Needed Moderately strenuous housework (laundry) No Help Needed Shopping for personal items (toiletries/medicines) No Help Needed Shopping for groceries No Help Needed Driving No Help Needed Climbing a flight of stairs No Help Needed Getting to places beyond walking distances No Help Needed Medication reconciliation up to date and corrected with patient at this time.

## 2019-05-14 NOTE — PATIENT INSTRUCTIONS
Medicare Wellness Visit, Male The best way to live healthy is to have a lifestyle where you eat a well-balanced diet, exercise regularly, limit alcohol use, and quit all forms of tobacco/nicotine, if applicable. Regular preventive services are another way to keep healthy. Preventive services (vaccines, screening tests, monitoring & exams) can help personalize your care plan, which helps you manage your own care. Screening tests can find health problems at the earliest stages, when they are easiest to treat. [facility] follows the current, evidence-based guidelines published by the [facility] ([facility]) when recommending preventive services for our patients. Because we follow these guidelines, sometimes recommendations change over time as research supports it. (For example, a prostate screening blood test is no longer routinely recommended for men with no symptoms.) Of course, you and your doctor may decide to screen more often for some diseases, based on your risk and co-morbidities (chronic disease you are already diagnosed with). Preventive services for you include: - Medicare offers their members a free annual wellness visit, which is time for you and your primary care provider to discuss and plan for your preventive service needs. Take advantage of this benefit every year! 
-All adults over age 72 should receive the recommended pneumonia vaccines. Current USPSTF guidelines recommend a series of two vaccines for the best pneumonia protection.  
-All adults should have a flu vaccine yearly and an ECG.  All adults age 61 and older should receive a shingles vaccine once in their lifetime.   
-All adults age 38-68 who are overweight should have a diabetes screening test once every three years.  
-Other screening tests & preventive services for persons with diabetes include: an eye exam to screen for diabetic retinopathy, a kidney function test, a foot exam, and stricter control over your cholesterol.  
-Cardiovascular screening for adults with routine risk involves an electrocardiogram (ECG) at intervals determined by the provider.  
-Colorectal cancer screening should be done for adults age 54-65 with no increased risk factors for colorectal cancer. There are a number of acceptable methods of screening for this type of cancer. Each test has its own benefits and drawbacks. Discuss with your provider what is most appropriate for you during your annual wellness visit. The different tests include: colonoscopy (considered the best screening method), a fecal occult blood test, a fecal DNA test, and sigmoidoscopy. 
-All adults born between [facility] should be screened once for Hepatitis C. 
-An Abdominal Aortic Aneurysm (AAA) Screening is recommended for men age 73-68 who has ever smoked in their lifetime. Here is a list of your current Health Maintenance items (your personalized list of preventive services) with a due date: 
Health Maintenance Due Topic Date Due  Pneumococcal Vaccine (2 of 2 - PCV13) 03/09/2016  Glaucoma Screening   08/28/2017 [facility] Annual Well Visit  06/01/2019

## 2019-05-15 LAB
ALBUMIN SERPL-MCNC: 4.4 G/DL (ref 3.5–4.8)
ALBUMIN/GLOB SERPL: 1.9 {RATIO} (ref 1.2–2.2)
ALP SERPL-CCNC: 53 IU/L (ref 39–117)
ALT SERPL-CCNC: 22 IU/L (ref 0–44)
AST SERPL-CCNC: 24 IU/L (ref 0–40)
BILIRUB SERPL-MCNC: 0.4 MG/DL (ref 0–1.2)
BUN SERPL-MCNC: 13 MG/DL (ref 8–27)
BUN/CREAT SERPL: 14 (ref 10–24)
CALCIUM SERPL-MCNC: 9.4 MG/DL (ref 8.6–10.2)
CHLORIDE SERPL-SCNC: 102 MMOL/L (ref 96–106)
CHOLEST SERPL-MCNC: 176 MG/DL (ref 100–199)
CO2 SERPL-SCNC: 29 MMOL/L (ref 20–29)
CREAT SERPL-MCNC: 0.95 MG/DL (ref 0.76–1.27)
EST. AVERAGE GLUCOSE BLD GHB EST-MCNC: 120 MG/DL
GLOBULIN SER CALC-MCNC: 2.3 G/DL (ref 1.5–4.5)
GLUCOSE SERPL-MCNC: 90 MG/DL (ref 65–99)
HBA1C MFR BLD: 5.8 % (ref 4.8–5.6)
HDLC SERPL-MCNC: 53 MG/DL
LDLC SERPL CALC-MCNC: 109 MG/DL (ref 0–99)
POTASSIUM SERPL-SCNC: 4.3 MMOL/L (ref 3.5–5.2)
PROT SERPL-MCNC: 6.7 G/DL (ref 6–8.5)
SODIUM SERPL-SCNC: 144 MMOL/L (ref 134–144)
TRIGL SERPL-MCNC: 72 MG/DL (ref 0–149)
VLDLC SERPL CALC-MCNC: 14 MG/DL (ref 5–40)

## 2019-10-28 ENCOUNTER — OFFICE VISIT (OUTPATIENT)
Dept: INTERNAL MEDICINE CLINIC | Facility: CLINIC | Age: 79
End: 2019-10-28

## 2019-10-28 VITALS
HEIGHT: 71 IN | DIASTOLIC BLOOD PRESSURE: 80 MMHG | HEART RATE: 75 BPM | BODY MASS INDEX: 25.56 KG/M2 | RESPIRATION RATE: 14 BRPM | WEIGHT: 182.6 LBS | TEMPERATURE: 97.7 F | OXYGEN SATURATION: 93 % | SYSTOLIC BLOOD PRESSURE: 134 MMHG

## 2019-10-28 DIAGNOSIS — R35.0 FREQUENCY OF URINATION: ICD-10-CM

## 2019-10-28 DIAGNOSIS — G89.29 CHRONIC RIGHT-SIDED THORACIC BACK PAIN: ICD-10-CM

## 2019-10-28 DIAGNOSIS — N30.01 ACUTE CYSTITIS WITH HEMATURIA: Primary | ICD-10-CM

## 2019-10-28 DIAGNOSIS — M54.6 CHRONIC RIGHT-SIDED THORACIC BACK PAIN: ICD-10-CM

## 2019-10-28 LAB
BILIRUB UR QL STRIP: NEGATIVE
GLUCOSE UR-MCNC: NEGATIVE MG/DL
KETONES P FAST UR STRIP-MCNC: NEGATIVE MG/DL
PH UR STRIP: 8.5 [PH] (ref 4.6–8)
PROT UR QL STRIP: NEGATIVE
SP GR UR STRIP: 1.01 (ref 1–1.03)
UA UROBILINOGEN AMB POC: ABNORMAL (ref 0.2–1)
URINALYSIS CLARITY POC: ABNORMAL
URINALYSIS COLOR POC: YELLOW
URINE BLOOD POC: ABNORMAL
URINE LEUKOCYTES POC: ABNORMAL
URINE NITRITES POC: NEGATIVE

## 2019-10-28 RX ORDER — CEPHALEXIN 500 MG/1
500 CAPSULE ORAL 2 TIMES DAILY
Qty: 14 CAP | Refills: 0 | Status: SHIPPED | OUTPATIENT
Start: 2019-10-28 | End: 2019-11-04

## 2019-10-28 NOTE — PROGRESS NOTES
HPI  Mr. Aziza Womack is a 70y.o. year old male, he is seen today for possible UTI. Has had symptoms of urinary frequency and dysuria. No n/v/abd pain. No hematuria. Chronic back pain for about a year, right thoracic region, worse with certain positions, not getting better. No f/c. Chief Complaint   Patient presents with    Bladder Infection     Room 2A// urinary burning and frequency x 4 days // fasting         Prior to Admission medications    Medication Sig Start Date End Date Taking? Authorizing Provider   cephALEXin (KEFLEX) 500 mg capsule Take 1 Cap by mouth two (2) times a day for 7 days. 10/28/19 11/4/19 Yes Manfred Woodson MD   tamsulosin (FLOMAX) 0.4 mg capsule Take 1 Cap by mouth nightly. 4/1/19  Yes Rupali Smith NP   hydroCHLOROthiazide (HYDRODIURIL) 12.5 mg tablet take 1 tablet by mouth once daily 2/11/19  Yes Rupali Smith NP   pantoprazole (PROTONIX) 20 mg tablet Take 1 Tab by mouth daily. 11/28/18  Yes Manfred Woodson MD   sertraline (ZOLOFT) 50 mg tablet Take  by mouth daily. Yes Provider, Historical   albuterol (PROVENTIL HFA, VENTOLIN HFA, PROAIR HFA) 90 mcg/actuation inhaler Take 1 Puff by inhalation every four (4) hours as needed for Wheezing. 11/30/17  Yes Manfred Woodson MD   finasteride (PROSCAR) 5 mg tablet Take 5 mg by mouth daily. Yes Provider, Historical   cholecalciferol (VITAMIN D3) 1,000 unit tablet Take  by mouth daily. Yes Provider, Historical   psyllium (METAMUCIL) powd Take  by mouth daily. Yes Provider, Historical   ASMANEX TWISTHALER 220 mcg (60 doses) inhaler Inhale 2 puffs by mouth  every day  Patient taking differently: Inhale 2 puffs by mouth  every day prn 10/19/15  Yes Manfred Woodson MD   Cetirizine (ZYRTEC) 10 mg cap Take 10 mg by mouth daily. Yes Provider, Historical   MULTIVITAMINS (MULTIVITAMIN PO) Take  by mouth daily.  7/22/10  Yes Provider, Historical         Allergies   Allergen Reactions    Melon Flavor Swelling Facial swelling with watermelon and cantelope         REVIEW OF SYSTEMS:  Per HPI    PHYSICAL EXAM:  Visit Vitals  /80   Pulse 75   Temp 97.7 °F (36.5 °C) (Oral)   Resp 14   Ht 5' 11\" (1.803 m)   Wt 182 lb 9.6 oz (82.8 kg)   SpO2 93%   BMI 25.47 kg/m²     Constitutional: Appears well-developed and well-nourished. No distress. HENT:   Head: Normocephalic and atraumatic. Eyes: No scleral icterus. Cardiovascular: Normal S1/S2, regular rhythm. No murmurs, rubs, or gallops. Pulmonary/Chest: Effort normal and breath sounds normal. No respiratory distress. No wheezes, rhonchi, or rales. Abdomen: Soft, NT/ND, +BS, no rebound or guarding, no masses, no HSM appreciated. Back: no pain on palpation spine  Ext: No edema. Neurological: Alert. Psychiatric: Normal mood and affect. Behavior is normal.     Lab Results   Component Value Date/Time    Sodium 144 05/14/2019 09:40 AM    Potassium 4.3 05/14/2019 09:40 AM    Chloride 102 05/14/2019 09:40 AM    CO2 29 05/14/2019 09:40 AM    Anion gap 8 10/24/2015 09:05 PM    Glucose 90 05/14/2019 09:40 AM    BUN 13 05/14/2019 09:40 AM    Creatinine 0.95 05/14/2019 09:40 AM    BUN/Creatinine ratio 14 05/14/2019 09:40 AM    GFR est AA 90 05/14/2019 09:40 AM    GFR est non-AA 77 05/14/2019 09:40 AM    Calcium 9.4 05/14/2019 09:40 AM    Bilirubin, total 0.4 05/14/2019 09:40 AM    AST (SGOT) 24 05/14/2019 09:40 AM    Alk.  phosphatase 53 05/14/2019 09:40 AM    Protein, total 6.7 05/14/2019 09:40 AM    Albumin 4.4 05/14/2019 09:40 AM    Globulin 2.6 10/24/2015 09:05 PM    A-G Ratio 1.9 05/14/2019 09:40 AM    ALT (SGPT) 22 05/14/2019 09:40 AM     Lab Results   Component Value Date/Time    Hemoglobin A1c 5.8 (H) 05/14/2019 09:40 AM    Hemoglobin A1c 5.8 (H) 11/14/2018 09:03 AM    Hemoglobin A1c 5.8 (H) 11/30/2017 12:23 PM      Lab Results   Component Value Date/Time    Cholesterol, total 176 05/14/2019 09:40 AM    HDL Cholesterol 53 05/14/2019 09:40 AM    LDL, calculated 109 (H) 05/14/2019 09:40 AM    VLDL, calculated 14 05/14/2019 09:40 AM    Triglyceride 72 05/14/2019 09:40 AM    CHOL/HDL Ratio 3.5 07/29/2010 09:06 AM          ASSESSMENT/PLAN  Diagnoses and all orders for this visit:    1. Acute cystitis with hematuria  -     CULTURE, URINE  -     cephALEXin (KEFLEX) 500 mg capsule; Take 1 Cap by mouth two (2) times a day for 7 days. Treat as above  2. Frequency of urination  -     AMB POC URINALYSIS DIP STICK AUTO W/O MICRO    3. Chronic right-sided thoracic back pain  -     XR SPINE THORAC 3 V; Future  Given chronicity of pain will get films, though suspect msk in nature - consider PT - can discuss at follow up in 3 weeks        Health Maintenance Due   Topic Date Due    Pneumococcal 65+ years (2 of 2 - PCV13) 03/09/2016    GLAUCOMA SCREENING Q2Y  08/28/2017    Influenza Age 9 to Adult  08/01/2019        Follow-up and Dispositions    · Return if symptoms worsen or fail to improve. Reviewed plan of care. Patient has provided input and agrees with goals. The nurse provided the patient and/or family with advanced directive information if needed and encouraged the patient to provide a copy to the office when available.

## 2019-10-28 NOTE — PROGRESS NOTES
Navi Kemdelmer  Identified pt with two pt identifiers(name and ). Chief Complaint   Patient presents with    Bladder Infection     Room 2A// urinary burning and frequency x 4 days // fasting        1. Have you been to the ER, urgent care clinic since your last visit? Hospitalized since your last visit? NO    2. Have you seen or consulted any other health care providers outside of the [address] [place] [address] since your last visit? Include any pap smears or colon screening. NO      Provider notified of reason for visit, vitals and flowsheets obtained on patients.      Patient received paperwork for advance directive during previous visit but has not completed at this time     Reviewed record In preparation for visit, huddled with provider and have obtained necessary documentation      Health Maintenance Due   Topic    Pneumococcal 65+ years (2 of 2 - PCV13)    GLAUCOMA SCREENING Q2Y     Influenza Age 5 to Adult        Wt Readings from Last 3 Encounters:   10/28/19 182 lb 9.6 oz (82.8 kg)   19 179 lb 3.2 oz (81.3 kg)   19 187 lb 6.4 oz (85 kg)     Temp Readings from Last 3 Encounters:   19 97.7 °F (36.5 °C) (Oral)   19 97.6 °F (36.4 °C) (Oral)   18 97.7 °F (36.5 °C) (Oral)     BP Readings from Last 3 Encounters:   19 120/70   19 120/68   18 133/76     Pulse Readings from Last 3 Encounters:   19 66   19 80   18 80     Vitals:    10/28/19 0900   Resp: 14   Weight: 182 lb 9.6 oz (82.8 kg)   Height: 5' 11\" (1.803 m)         Learning Assessment:  :     Learning Assessment 11/10/2014   PRIMARY LEARNER Patient   HIGHEST LEVEL OF EDUCATION - PRIMARY LEARNER  GRADUATED HIGH SCHOOL OR GED   BARRIERS PRIMARY LEARNER NONE     NONE   CO-LEARNER CAREGIVER No   CO-LEARNER NAME none   PRIMARY LANGUAGE ENGLISH   LEARNER PREFERENCE PRIMARY DEMONSTRATION   ANSWERED BY patients   RELATIONSHIP SELF       Depression Screening:  :     3 most recent PHQ Screens 5/14/2019   Little interest or pleasure in doing things Not at all   Feeling down, depressed, irritable, or hopeless Not at all   Total Score PHQ 2 0       Fall Risk Assessment:  :     Fall Risk Assessment, last 12 mths 10/28/2019   Able to walk? Yes   Fall in past 12 months? No   Fall with injury? -       Abuse Screening:  :     Abuse Screening Questionnaire 10/28/2019 5/31/2018 11/28/2016 11/10/2014   Do you ever feel afraid of your partner? N N N N   Are you in a relationship with someone who physically or mentally threatens you? N N N N   Is it safe for you to go home? Y Y Y Y       ADL Screening:  :     ADL Assessment 5/31/2018   Feeding yourself No Help Needed   Getting from bed to chair No Help Needed   Getting dressed No Help Needed   Bathing or showering No Help Needed   Walk across the room (includes cane/walker) No Help Needed   Using the telphone No Help Needed   Taking your medications No Help Needed   Preparing meals No Help Needed   Managing money (expenses/bills) No Help Needed   Moderately strenuous housework (laundry) No Help Needed   Shopping for personal items (toiletries/medicines) No Help Needed   Shopping for groceries No Help Needed   Driving No Help Needed   Climbing a flight of stairs No Help Needed   Getting to places beyond walking distances No Help Needed         Medication reconciliation up to date and corrected with patient at this time.

## 2019-10-31 LAB — BACTERIA UR CULT: ABNORMAL

## 2019-10-31 NOTE — PROGRESS NOTES
See if feeling better - if symptoms still present stop keflex and start macrobid - will send rx if symptoms still present

## 2019-11-18 ENCOUNTER — OFFICE VISIT (OUTPATIENT)
Dept: INTERNAL MEDICINE CLINIC | Facility: CLINIC | Age: 79
End: 2019-11-18

## 2019-11-18 VITALS
RESPIRATION RATE: 14 BRPM | WEIGHT: 184 LBS | OXYGEN SATURATION: 96 % | SYSTOLIC BLOOD PRESSURE: 132 MMHG | HEART RATE: 68 BPM | DIASTOLIC BLOOD PRESSURE: 84 MMHG | BODY MASS INDEX: 25.76 KG/M2 | HEIGHT: 71 IN | TEMPERATURE: 97.6 F

## 2019-11-18 DIAGNOSIS — M54.6 CHRONIC MIDLINE THORACIC BACK PAIN: ICD-10-CM

## 2019-11-18 DIAGNOSIS — G89.29 CHRONIC MIDLINE THORACIC BACK PAIN: ICD-10-CM

## 2019-11-18 DIAGNOSIS — Z23 ENCOUNTER FOR IMMUNIZATION: ICD-10-CM

## 2019-11-18 DIAGNOSIS — E78.5 DYSLIPIDEMIA: ICD-10-CM

## 2019-11-18 DIAGNOSIS — J45.40 MODERATE PERSISTENT ASTHMA WITHOUT COMPLICATION: ICD-10-CM

## 2019-11-18 DIAGNOSIS — K21.00 GASTROESOPHAGEAL REFLUX DISEASE WITH ESOPHAGITIS: ICD-10-CM

## 2019-11-18 DIAGNOSIS — E78.2 MIXED HYPERLIPIDEMIA: Primary | ICD-10-CM

## 2019-11-18 DIAGNOSIS — R73.03 PREDIABETES: ICD-10-CM

## 2019-11-18 RX ORDER — PANTOPRAZOLE SODIUM 20 MG/1
20 TABLET, DELAYED RELEASE ORAL DAILY
Qty: 90 TAB | Refills: 3 | Status: SHIPPED | OUTPATIENT
Start: 2019-11-18 | End: 2020-04-16

## 2019-11-18 NOTE — PROGRESS NOTES
Fani Pace  Identified pt with two pt identifiers(name and ). Chief Complaint   Patient presents with    Asthma     Room . Have you been to the ER, urgent care clinic since your last visit? Hospitalized since your last visit? NO    2. Have you seen or consulted any other health care providers outside of the [address] [place] [address] since your last visit? Include any pap smears or colon screening. NO      Provider notified of reason for visit, vitals and flowsheets obtained on patients. Patient received paperwork for advance directive during previous visit but has not completed at this time     Reviewed record In preparation for visit, huddled with provider and have obtained necessary documentation      Health Maintenance Due   Topic    GLAUCOMA SCREENING Q2Y     Influenza Age 5 to Adult        Wt Readings from Last 3 Encounters:   10/28/19 182 lb 9.6 oz (82.8 kg)   19 179 lb 3.2 oz (81.3 kg)   19 187 lb 6.4 oz (85 kg)     Temp Readings from Last 3 Encounters:   10/28/19 97.7 °F (36.5 °C) (Oral)   19 97.7 °F (36.5 °C) (Oral)   19 97.6 °F (36.4 °C) (Oral)     BP Readings from Last 3 Encounters:   10/28/19 134/80   19 120/70   19 120/68     Pulse Readings from Last 3 Encounters:   10/28/19 75   19 66   19 80     There were no vitals filed for this visit.       Learning Assessment:  :     Learning Assessment 11/10/2014   PRIMARY LEARNER Patient   HIGHEST LEVEL OF EDUCATION - PRIMARY LEARNER  GRADUATED HIGH SCHOOL OR GED   BARRIERS PRIMARY LEARNER NONE     NONE   CO-LEARNER CAREGIVER No   CO-LEARNER NAME none   PRIMARY LANGUAGE ENGLISH   LEARNER PREFERENCE PRIMARY DEMONSTRATION   ANSWERED BY patients   RELATIONSHIP SELF       Depression Screening:  :     3 most recent PHQ Screens 2019   Little interest or pleasure in doing things Not at all   Feeling down, depressed, irritable, or hopeless Not at all   Total Score PHQ 2 0       Fall Risk Assessment:  :     Fall Risk Assessment, last 12 mths 10/28/2019   Able to walk? Yes   Fall in past 12 months? No   Fall with injury? -       Abuse Screening:  :     Abuse Screening Questionnaire 10/28/2019 5/31/2018 11/28/2016 11/10/2014   Do you ever feel afraid of your partner? N N N N   Are you in a relationship with someone who physically or mentally threatens you? N N N N   Is it safe for you to go home? Y Y Y Y       ADL Screening:  :     ADL Assessment 5/31/2018   Feeding yourself No Help Needed   Getting from bed to chair No Help Needed   Getting dressed No Help Needed   Bathing or showering No Help Needed   Walk across the room (includes cane/walker) No Help Needed   Using the telphone No Help Needed   Taking your medications No Help Needed   Preparing meals No Help Needed   Managing money (expenses/bills) No Help Needed   Moderately strenuous housework (laundry) No Help Needed   Shopping for personal items (toiletries/medicines) No Help Needed   Shopping for groceries No Help Needed   Driving No Help Needed   Climbing a flight of stairs No Help Needed   Getting to places beyond walking distances No Help Needed         Medication reconciliation up to date and corrected with patient at this time.

## 2019-11-18 NOTE — PROGRESS NOTES
HPI  Mr. Gayatri Moore is a 70y.o. year old male, he is seen today for follow up asthma, HTN, GERD. No more symptoms of UTI. Has mild cough with some wheezing, no chest pain or sob. Only uses asmanex prn cough, wheezing. Has been using asmanex prn. Says his worst problem is belching. No symptoms of gerd taking pantoprazole. Has chronic back pain in lumbar region, now some pain in thoracic region - worse with movements. Better with walking, no pain with walking. Worse when he first gets up. Weight is stable. Hasn't taken medications yet. Wants to try decreasing protonix to 20mg - is taking 40mg rx by [place]. Chief Complaint   Patient presents with    Asthma     Room 2A//         Prior to Admission medications    Medication Sig Start Date End Date Taking? Authorizing Provider   pantoprazole (PROTONIX) 20 mg tablet Take 1 Tab by mouth daily. 11/18/19  Yes Maureen Garcia MD   tamsulosin (FLOMAX) 0.4 mg capsule Take 1 Cap by mouth nightly. 4/1/19  Yes Van Holm, NP   hydroCHLOROthiazide (HYDRODIURIL) 12.5 mg tablet take 1 tablet by mouth once daily 2/11/19  Yes Van Holm, ALBERT   sertraline (ZOLOFT) 50 mg tablet Take  by mouth daily. Yes Provider, Historical   albuterol (PROVENTIL HFA, VENTOLIN HFA, PROAIR HFA) 90 mcg/actuation inhaler Take 1 Puff by inhalation every four (4) hours as needed for Wheezing. 11/30/17  Yes Maureen Garcia MD   finasteride (PROSCAR) 5 mg tablet Take 5 mg by mouth daily. Yes Provider, Historical   cholecalciferol (VITAMIN D3) 1,000 unit tablet Take  by mouth daily. Yes Provider, Historical   psyllium (METAMUCIL) powd Take  by mouth daily. Yes Provider, Historical   ASMANEX TWISTHALER 220 mcg (60 doses) inhaler Inhale 2 puffs by mouth  every day  Patient taking differently: Inhale 2 puffs by mouth  every day prn 10/19/15  Yes Maureen Garcia MD   Cetirizine (ZYRTEC) 10 mg cap Take 10 mg by mouth daily.    Yes Provider, Historical MULTIVITAMINS (MULTIVITAMIN PO) Take  by mouth daily. 7/22/10  Yes Provider, Historical   pantoprazole (PROTONIX) 20 mg tablet Take 1 Tab by mouth daily. 11/28/18 11/18/19  Aime Rogers MD         Allergies   Allergen Reactions    Melon Flavor Swelling     Facial swelling with watermelon and cantelope         REVIEW OF SYSTEMS:  Per HPI    PHYSICAL EXAM:  Visit Vitals  /84   Pulse 68   Temp 97.6 °F (36.4 °C) (Oral)   Resp 14   Ht 5' 11\" (1.803 m)   Wt 184 lb (83.5 kg)   SpO2 96%   BMI 25.66 kg/m²     Constitutional: Appears well-developed and well-nourished. No distress. HENT:   Head: Normocephalic and atraumatic. Eyes: No scleral icterus. Neck: no lad, no tm, supple   Cardiovascular: Normal S1/S2, regular rhythm. No murmurs, rubs, or gallops. Pulmonary/Chest: Effort normal and breath sounds normal. No respiratory distress. No wheezes, rhonchi, or rales. Abdomen: Soft, NT/ND, +BS, no rebound or guarding, no masses, no HSM appreciated. Back: no pain on palpation  Ext: No edema. Neurological: Alert. Psychiatric: Normal mood and affect. Behavior is normal.     Lab Results   Component Value Date/Time    Sodium 144 05/14/2019 09:40 AM    Potassium 4.3 05/14/2019 09:40 AM    Chloride 102 05/14/2019 09:40 AM    CO2 29 05/14/2019 09:40 AM    Anion gap 8 10/24/2015 09:05 PM    Glucose 90 05/14/2019 09:40 AM    BUN 13 05/14/2019 09:40 AM    Creatinine 0.95 05/14/2019 09:40 AM    BUN/Creatinine ratio 14 05/14/2019 09:40 AM    GFR est AA 90 05/14/2019 09:40 AM    GFR est non-AA 77 05/14/2019 09:40 AM    Calcium 9.4 05/14/2019 09:40 AM    Bilirubin, total 0.4 05/14/2019 09:40 AM    AST (SGOT) 24 05/14/2019 09:40 AM    Alk.  phosphatase 53 05/14/2019 09:40 AM    Protein, total 6.7 05/14/2019 09:40 AM    Albumin 4.4 05/14/2019 09:40 AM    Globulin 2.6 10/24/2015 09:05 PM    A-G Ratio 1.9 05/14/2019 09:40 AM    ALT (SGPT) 22 05/14/2019 09:40 AM     Lab Results   Component Value Date/Time    Hemoglobin A1c 5.8 (H) 05/14/2019 09:40 AM    Hemoglobin A1c 5.8 (H) 11/14/2018 09:03 AM    Hemoglobin A1c 5.8 (H) 11/30/2017 12:23 PM      Lab Results   Component Value Date/Time    Cholesterol, total 176 05/14/2019 09:40 AM    HDL Cholesterol 53 05/14/2019 09:40 AM    LDL, calculated 109 (H) 05/14/2019 09:40 AM    VLDL, calculated 14 05/14/2019 09:40 AM    Triglyceride 72 05/14/2019 09:40 AM    CHOL/HDL Ratio 3.5 07/29/2010 09:06 AM          ASSESSMENT/PLAN  Diagnoses and all orders for this visit:    1. Mixed hyperlipidemia  Check labs  2. Encounter for immunization  -     INFLUENZA VACCINE INACTIVATED (IIV), SUBUNIT, ADJUVANTED, IM  -     ADMIN INFLUENZA VIRUS VAC    3. Moderate persistent asthma without complication  Controlled now - advised using asmanex as controller medication, albuterol as rescue  4. Dyslipidemia  -     METABOLIC PANEL, COMPREHENSIVE  -     LIPID PANEL    5. Prediabetes  -     HEMOGLOBIN A1C WITH EAG    6. Chronic midline thoracic back pain  msk in nature - rec stretches, heat - recent films negative  7. Gastroesophageal reflux disease with esophagitis  -     pantoprazole (PROTONIX) 20 mg tablet; Take 1 Tab by mouth daily. Will try decreasing dose of protonix as above - has hiatal hernia likely contributing to belching        Health Maintenance Due   Topic Date Due    GLAUCOMA SCREENING Q2Y  08/28/2017    Influenza Age 5 to Adult  08/01/2019        Follow-up and Dispositions    · Return in about 6 months (around 5/18/2020) for bp, chol, AWV, asthma. Reviewed plan of care. Patient has provided input and agrees with goals. The nurse provided the patient and/or family with advanced directive information if needed and encouraged the patient to provide a copy to the office when available.

## 2019-11-18 NOTE — PATIENT INSTRUCTIONS
Vaccine Information Statement Influenza (Flu) Vaccine (Inactivated or Recombinant): What You Need to Know Many Vaccine Information Statements are available in [place] and other languages. See www.immunize.org/vis Hojas de información sobre vacunas están disponibles en español y en muchos otros idiomas. Visite www.immunize.org/vis 1. Why get vaccinated? Influenza vaccine can prevent influenza (flu). Flu is a contagious disease that spreads around the [facility] every year, usually between October and May. Anyone can get the flu, but it is more dangerous for some people. Infants and young children, people 72years of age and older, pregnant women, and people with certain health conditions or a weakened immune system are at greatest risk of flu complications. Pneumonia, bronchitis, sinus infections and ear infections are examples of flu-related complications. If you have a medical condition, such as heart disease, cancer or diabetes, flu can make it worse. Flu can cause fever and chills, sore throat, muscle aches, fatigue, cough, headache, and runny or stuffy nose. Some people may have vomiting and diarrhea, though this is more common in children than adults. Each year thousands of people in the [facility] die from flu, and many more are hospitalized. Flu vaccine prevents millions of illnesses and flu-related visits to the doctor each year. 2. Influenza vaccines CDC recommends everyone 10months of age and older get vaccinated every flu season. Children 6 months through 6years of age may need 2 doses during a single flu season. Everyone else needs only 1 dose each flu season. It takes about 2 weeks for protection to develop after vaccination. There are many flu viruses, and they are always changing. Each year a new flu vaccine is made to protect against three or four viruses that are likely to cause disease in the upcoming flu season.  Even when the vaccine doesnt exactly match these viruses, it may still provide some protection. Influenza vaccine does not cause flu. Influenza vaccine may be given at the same time as other vaccines. 3. Talk with your health care provider Tell your vaccine provider if the person getting the vaccine: 
 Has had an allergic reaction after a previous dose of influenza vaccine, or has any severe, life-threatening allergies.  Has ever had Guillain-Barré Syndrome (also called GBS). In some cases, your health care provider may decide to postpone influenza vaccination to a future visit. People with minor illnesses, such as a cold, may be vaccinated. People who are moderately or severely ill should usually wait until they recover before getting influenza vaccine. Your health care provider can give you more information. 4. Risks of a reaction  Soreness, redness, and swelling where shot is given, fever, muscle aches, and headache can happen after influenza vaccine.  There may be a very small increased risk of Guillain-Barré Syndrome (GBS) after inactivated influenza vaccine (the flu shot). Andriy Douglas children who get the flu shot along with pneumococcal vaccine (PCV13), and/or DTaP vaccine at the same time might be slightly more likely to have a seizure caused by fever. Tell your health care provider if a child who is getting flu vaccine has ever had a seizure. People sometimes faint after medical procedures, including vaccination. Tell your provider if you feel dizzy or have vision changes or ringing in the ears. As with any medicine, there is a very remote chance of a vaccine causing a severe allergic reaction, other serious injury, or death. 5. What if there is a serious problem? An allergic reaction could occur after the vaccinated person leaves the clinic.  If you see signs of a severe allergic reaction (hives, swelling of the face and throat, difficulty breathing, a fast heartbeat, dizziness, or weakness), call 9-1-1 and get the person to the nearest hospital. 
 
For other signs that concern you, call your health care provider. Adverse reactions should be reported to the Vaccine Adverse Event Reporting System (VAERS). Your health care provider will usually file this report, or you can do it yourself. Visit the VAERS website at www.vaers. hhs.gov or call 0-861.650.7964. VAERS is only for reporting reactions, and VAERS staff do not give medical advice. 6. The National Vaccine Injury Compensation Program 
 
The [facility] Vaccine Injury Compensation Program (VICP) is a federal program that was created to compensate people who may have been injured by certain vaccines. Visit the VICP website at www.[facility].gov/vaccinecompensation or call 1-709.435.6425 to learn about the program and about filing a claim. There is a time limit to file a claim for compensation. 7. How can I learn more?  Ask your health care provider.  Call your local or state health department.  Contact the Centers for Disease Control and Prevention (CDC): 
- Call 3-970.219.8410 (5-814-PNB-INFO) or 
- Visit CDCs influenza website at www.cdc.gov/flu Vaccine Information Statement (Interim) Inactivated Influenza Vaccine 8/15/2019 
42 JURGENChralie Perera Sreekanth [id number] Department of [facility] and [employer] Centers for Disease Control and Prevention Office Use Only Gas and Bloating: Care Instructions Your Care Instructions Gas and bloating can be uncomfortable and embarrassing problems. All people pass gas, but some people produce more gas than others, sometimes enough to cause distress. It is normal to pass gas from 6 to 20 times per day. Excess gas usually is not caused by a serious health problem. Gas and bloating usually are caused by something you eat or drink, including some food supplements and medicines. Gas and bloating are usually harmless and go away without treatment. However, changing your diet can help end the problem. Some over-the-counter medicines can help prevent gas and relieve bloating. Follow-up care is a key part of your treatment and safety. Be sure to make and go to all appointments, and call your doctor if you are having problems. It's also a good idea to know your test results and keep a list of the medicines you take. How can you care for yourself at home? · Keep a food diary if you think a food gives you gas. Write down what you eat or drink. Also record when you get gas. If you notice that a food seems to cause your gas each time, avoid it and see if the gas goes away. Examples of foods that cause gas include: ? Fried and fatty foods. ? Beans. ? Vegetables such as artichokes, asparagus, broccoli, brussels sprouts, cabbage, cauliflower, cucumbers, green peppers, onions, peas, radishes, and raw potatoes. ? Fruits such as apricots, bananas, melons, peaches, pears, prunes, and raw apples. ? Wheat and wheat bran. · Soak dry beans in water overnight, then dump the water and cook the soaked beans in new water. This can help prevent gas and bloating. · If you have problems with lactose, avoid dairy products such as milk and cheese. · Try not to swallow air. Do not drink through a straw, gulp your food, or chew gum. · Take an over-the-counter medicine. Read and follow all instructions on the label. ? Food enzymes, such as Beano, can be added to gas-producing foods to prevent gas. ? Antacids, such as Maalox Anti-Gas and Mylanta Gas, can relieve bloating by making you burp. Be careful when you take over-the-counter antacid medicines. Many of these medicines have aspirin in them. Read the label to make sure that you are not taking more than the recommended dose. Too much aspirin can be harmful. ? Activated charcoal tablets, such as CharcoCaps, may decrease odor from gas you pass.  
? If you have problems with lactose, you can take medicines such as Dairy Ease and Lactaid with dairy products to prevent gas and bloating. · Get some exercise regularly. When should you call for help? Call 911 anytime you think you may need emergency care. For example, call if: 
  · You have gas and signs of a heart attack, such as: 
? Chest pain or pressure. ? Sweating. ? Shortness of breath. ? Nausea or vomiting. ? Pain that spreads from the chest to the neck, jaw, or one or both shoulders or arms. ? Dizziness or lightheadedness. ? A fast or uneven pulse. After calling 911, chew 1 adult-strength aspirin. Wait for an ambulance. Do not try to drive yourself.  
 Call your doctor now or seek immediate medical care if: 
  · You have severe belly pain.  
  · You have blood in your stool.  
 Watch closely for changes in your health, and be sure to contact your doctor if: 
  · You have blood or pus in your urine.  
  · Your urine is cloudy or smells bad.  
  · You are burping and have trouble swallowing.  
  · You feel bloated and have swelling in your belly.  
  · You do not get better as expected. Where can you learn more? Go to http://sajan-jos.info/. Enter V684 in the search box to learn more about \"Gas and Bloating: Care Instructions. \" Current as of: June 26, 2019 Content Version: 12.2 © 6054-9823 [employer]. Care instructions adapted under license by [employer] (which disclaims liability or warranty for this information). If you have questions about a medical condition or this instruction, always ask your healthcare professional. [place] [address] any warranty or liability for your use of this information.

## 2019-11-21 LAB
ALBUMIN SERPL-MCNC: 4.5 G/DL (ref 3.5–4.8)
ALBUMIN/GLOB SERPL: 2.5 {RATIO} (ref 1.2–2.2)
ALP SERPL-CCNC: 52 IU/L (ref 39–117)
ALT SERPL-CCNC: 34 IU/L (ref 0–44)
AST SERPL-CCNC: 25 IU/L (ref 0–40)
BILIRUB SERPL-MCNC: 0.5 MG/DL (ref 0–1.2)
BUN SERPL-MCNC: 11 MG/DL (ref 8–27)
BUN/CREAT SERPL: 12 (ref 10–24)
CALCIUM SERPL-MCNC: 9.2 MG/DL (ref 8.6–10.2)
CHLORIDE SERPL-SCNC: 101 MMOL/L (ref 96–106)
CHOLEST SERPL-MCNC: 181 MG/DL (ref 100–199)
CO2 SERPL-SCNC: 27 MMOL/L (ref 20–29)
CREAT SERPL-MCNC: 0.89 MG/DL (ref 0.76–1.27)
EST. AVERAGE GLUCOSE BLD GHB EST-MCNC: 123 MG/DL
GLOBULIN SER CALC-MCNC: 1.8 G/DL (ref 1.5–4.5)
GLUCOSE SERPL-MCNC: 92 MG/DL (ref 65–99)
HBA1C MFR BLD: 5.9 % (ref 4.8–5.6)
HDLC SERPL-MCNC: 54 MG/DL
LDLC SERPL CALC-MCNC: 108 MG/DL (ref 0–99)
POTASSIUM SERPL-SCNC: 4 MMOL/L (ref 3.5–5.2)
PROT SERPL-MCNC: 6.3 G/DL (ref 6–8.5)
SODIUM SERPL-SCNC: 143 MMOL/L (ref 134–144)
TRIGL SERPL-MCNC: 94 MG/DL (ref 0–149)
VLDLC SERPL CALC-MCNC: 19 MG/DL (ref 5–40)

## 2020-02-04 ENCOUNTER — TELEPHONE (OUTPATIENT)
Dept: INTERNAL MEDICINE CLINIC | Facility: CLINIC | Age: 80
End: 2020-02-04

## 2020-02-05 ENCOUNTER — OFFICE VISIT (OUTPATIENT)
Dept: INTERNAL MEDICINE CLINIC | Facility: CLINIC | Age: 80
End: 2020-02-05

## 2020-02-05 VITALS
SYSTOLIC BLOOD PRESSURE: 130 MMHG | HEIGHT: 71 IN | DIASTOLIC BLOOD PRESSURE: 90 MMHG | WEIGHT: 184.5 LBS | TEMPERATURE: 98.1 F | BODY MASS INDEX: 25.83 KG/M2 | RESPIRATION RATE: 14 BRPM | HEART RATE: 78 BPM | OXYGEN SATURATION: 95 %

## 2020-02-05 DIAGNOSIS — R35.0 URINARY FREQUENCY: ICD-10-CM

## 2020-02-05 DIAGNOSIS — R82.90 ABNORMAL URINALYSIS: ICD-10-CM

## 2020-02-05 DIAGNOSIS — N30.00 ACUTE CYSTITIS WITHOUT HEMATURIA: Primary | ICD-10-CM

## 2020-02-05 LAB
BILIRUB UR QL STRIP: NEGATIVE
GLUCOSE UR-MCNC: NEGATIVE MG/DL
KETONES P FAST UR STRIP-MCNC: NEGATIVE MG/DL
PH UR STRIP: 7 [PH] (ref 4.6–8)
PROT UR QL STRIP: NEGATIVE
SP GR UR STRIP: 1.01 (ref 1–1.03)
UA UROBILINOGEN AMB POC: NORMAL (ref 0.2–1)
URINALYSIS CLARITY POC: CLEAR
URINALYSIS COLOR POC: YELLOW
URINE BLOOD POC: NEGATIVE
URINE LEUKOCYTES POC: NORMAL
URINE NITRITES POC: NEGATIVE

## 2020-02-05 RX ORDER — NITROFURANTOIN 25; 75 MG/1; MG/1
100 CAPSULE ORAL 2 TIMES DAILY
Qty: 14 CAP | Refills: 0 | Status: SHIPPED | OUTPATIENT
Start: 2020-02-05 | End: 2020-02-12

## 2020-02-05 NOTE — PROGRESS NOTES
Arlene Mancini  Identified pt with two pt identifiers(name and ). Chief Complaint   Patient presents with    Bladder Infection     Room 2B// frequency and urgency x 3-4 days- denies burning        1. Have you been to the ER, urgent care clinic since your last visit? Hospitalized since your last visit? NO    2. Have you seen or consulted any other health care providers outside of the [address] [place] [address] since your last visit? Include any pap smears or colon screening. NO      Provider notified of reason for visit, vitals and flowsheets obtained on patients.      Patient received paperwork for advance directive during previous visit but has not completed at this time     Reviewed record In preparation for visit, huddled with provider and have obtained necessary documentation      Health Maintenance Due   Topic    GLAUCOMA SCREENING Q2Y        Wt Readings from Last 3 Encounters:   20 184 lb 8 oz (83.7 kg)   19 184 lb (83.5 kg)   10/28/19 182 lb 9.6 oz (82.8 kg)     Temp Readings from Last 3 Encounters:   19 97.6 °F (36.4 °C) (Oral)   10/28/19 97.7 °F (36.5 °C) (Oral)   19 97.7 °F (36.5 °C) (Oral)     BP Readings from Last 3 Encounters:   19 132/84   10/28/19 134/80   19 120/70     Pulse Readings from Last 3 Encounters:   19 68   10/28/19 75   19 66     Vitals:    20 1525   Resp: 14   Weight: 184 lb 8 oz (83.7 kg)   Height: 5' 11\" (1.803 m)   PainSc:   0 - No pain         Learning Assessment:  :     Learning Assessment 11/10/2014   PRIMARY LEARNER Patient   HIGHEST LEVEL OF EDUCATION - PRIMARY LEARNER  GRADUATED HIGH SCHOOL OR GED   BARRIERS PRIMARY LEARNER NONE     NONE   CO-LEARNER CAREGIVER No   CO-LEARNER NAME none   PRIMARY LANGUAGE ENGLISH   LEARNER PREFERENCE PRIMARY DEMONSTRATION   ANSWERED BY patients   RELATIONSHIP SELF       Depression Screening:  :     3 most recent PHQ Screens 2019   Little interest or pleasure in doing things Not at all   Feeling down, depressed, irritable, or hopeless Not at all   Total Score PHQ 2 0       Fall Risk Assessment:  :     Fall Risk Assessment, last 12 mths 10/28/2019   Able to walk? Yes   Fall in past 12 months? No   Fall with injury? -       Abuse Screening:  :     Abuse Screening Questionnaire 10/28/2019 5/31/2018 11/28/2016 11/10/2014   Do you ever feel afraid of your partner? N N N N   Are you in a relationship with someone who physically or mentally threatens you? N N N N   Is it safe for you to go home? Y Y Y Y       ADL Screening:  :     ADL Assessment 5/31/2018   Feeding yourself No Help Needed   Getting from bed to chair No Help Needed   Getting dressed No Help Needed   Bathing or showering No Help Needed   Walk across the room (includes cane/walker) No Help Needed   Using the telphone No Help Needed   Taking your medications No Help Needed   Preparing meals No Help Needed   Managing money (expenses/bills) No Help Needed   Moderately strenuous housework (laundry) No Help Needed   Shopping for personal items (toiletries/medicines) No Help Needed   Shopping for groceries No Help Needed   Driving No Help Needed   Climbing a flight of stairs No Help Needed   Getting to places beyond walking distances No Help Needed         Medication reconciliation up to date and corrected with patient at this time.

## 2020-02-05 NOTE — PATIENT INSTRUCTIONS

## 2020-02-06 LAB — BACTERIA UR CULT: NORMAL

## 2020-03-09 ENCOUNTER — OFFICE VISIT (OUTPATIENT)
Dept: INTERNAL MEDICINE CLINIC | Facility: CLINIC | Age: 80
End: 2020-03-09

## 2020-03-09 VITALS
WEIGHT: 184 LBS | HEIGHT: 71 IN | OXYGEN SATURATION: 97 % | RESPIRATION RATE: 14 BRPM | BODY MASS INDEX: 25.76 KG/M2 | HEART RATE: 88 BPM | SYSTOLIC BLOOD PRESSURE: 120 MMHG | DIASTOLIC BLOOD PRESSURE: 80 MMHG | TEMPERATURE: 97.7 F

## 2020-03-09 DIAGNOSIS — J01.90 ACUTE NON-RECURRENT SINUSITIS, UNSPECIFIED LOCATION: Primary | ICD-10-CM

## 2020-03-09 RX ORDER — AMOXICILLIN AND CLAVULANATE POTASSIUM 875; 125 MG/1; MG/1
1 TABLET, FILM COATED ORAL EVERY 12 HOURS
Qty: 14 TAB | Refills: 0 | Status: SHIPPED | OUTPATIENT
Start: 2020-03-09 | End: 2020-03-16

## 2020-03-09 NOTE — PROGRESS NOTES
HPI  Mr. Antelmo Lau is a 70y.o. year old male, he is seen today for cough, nasal congestion about 5 days. Started with sneezing, coughing - took otc medications. Cough is better but now has sinus pressure, HA worse than usual. Allergies had already been bothering him. Hasn't noticed wheezing. No f/c or sweats. No sore throat. Nasal congestion with little mucous form nose. Nasal spray isn't helping - flonase. Uses it normally during allergy season. Chief Complaint   Patient presents with    Cough     Room 2A// sx began thursday, denies fever or chills     Nasal Congestion     allergy like sx's         Prior to Admission medications    Medication Sig Start Date End Date Taking? Authorizing Provider   amoxicillin-clavulanate (AUGMENTIN) 875-125 mg per tablet Take 1 Tab by mouth every twelve (12) hours for 7 days. 3/9/20 3/16/20 Yes Ariana Pringle MD   pantoprazole (PROTONIX) 20 mg tablet Take 1 Tab by mouth daily. 11/18/19  Yes Ariana Pringle MD   tamsulosin (FLOMAX) 0.4 mg capsule Take 1 Cap by mouth nightly. 4/1/19  Yes Doug Morel NP   hydroCHLOROthiazide (HYDRODIURIL) 12.5 mg tablet take 1 tablet by mouth once daily 2/11/19  Yes Doug Morel NP   sertraline (ZOLOFT) 50 mg tablet Take  by mouth daily. Yes Provider, Historical   albuterol (PROVENTIL HFA, VENTOLIN HFA, PROAIR HFA) 90 mcg/actuation inhaler Take 1 Puff by inhalation every four (4) hours as needed for Wheezing. 11/30/17  Yes Ariana Pringle MD   finasteride (PROSCAR) 5 mg tablet Take 5 mg by mouth daily. Yes Provider, Historical   cholecalciferol (VITAMIN D3) 1,000 unit tablet Take  by mouth daily. Yes Provider, Historical   psyllium (METAMUCIL) powd Take  by mouth daily.    Yes Provider, Historical   ASMANEX TWISTHALER 220 mcg (60 doses) inhaler Inhale 2 puffs by mouth  every day  Patient taking differently: Inhale 2 puffs by mouth  every day prn 10/19/15  Yes Ariana Pringle MD   Cetirizine (ZYRTEC) 10 mg cap Take 10 mg by mouth daily. Yes Provider, Historical   MULTIVITAMINS (MULTIVITAMIN PO) Take  by mouth daily. 7/22/10  Yes Provider, Historical         Allergies   Allergen Reactions    Melon Flavor Swelling     Facial swelling with watermelon and cantelope         REVIEW OF SYSTEMS:  Per HPI    PHYSICAL EXAM:  Visit Vitals  /80   Pulse 88   Temp 97.7 °F (36.5 °C) (Oral)   Resp 14   Ht 5' 11\" (1.803 m)   Wt 184 lb (83.5 kg)   SpO2 97%   BMI 25.66 kg/m²     Constitutional: Appears well-developed and well-nourished. No distress. HENT:   Head: Normocephalic and atraumatic. Eyes: No scleral icterus. Nose: nares patent, mucosa normal   Mouth: OP clear without lesions, no pharyngeal exudate  Neck: no lad, no tm, supple   Cardiovascular: Normal S1/S2, regular rhythm. No murmurs, rubs, or gallops. Pulmonary/Chest: Effort normal and breath sounds normal. No respiratory distress. No wheezes, rhonchi, or rales. Ext: No edema. Neurological: Alert. Psychiatric: Normal mood and affect. Behavior is normal.     Lab Results   Component Value Date/Time    Sodium 143 11/20/2019 08:26 AM    Potassium 4.0 11/20/2019 08:26 AM    Chloride 101 11/20/2019 08:26 AM    CO2 27 11/20/2019 08:26 AM    Anion gap 8 10/24/2015 09:05 PM    Glucose 92 11/20/2019 08:26 AM    BUN 11 11/20/2019 08:26 AM    Creatinine 0.89 11/20/2019 08:26 AM    BUN/Creatinine ratio 12 11/20/2019 08:26 AM    GFR est AA 95 11/20/2019 08:26 AM    GFR est non-AA 82 11/20/2019 08:26 AM    Calcium 9.2 11/20/2019 08:26 AM    Bilirubin, total 0.5 11/20/2019 08:26 AM    AST (SGOT) 25 11/20/2019 08:26 AM    Alk.  phosphatase 52 11/20/2019 08:26 AM    Protein, total 6.3 11/20/2019 08:26 AM    Albumin 4.5 11/20/2019 08:26 AM    Globulin 2.6 10/24/2015 09:05 PM    A-G Ratio 2.5 (H) 11/20/2019 08:26 AM    ALT (SGPT) 34 11/20/2019 08:26 AM     Lab Results   Component Value Date/Time    Hemoglobin A1c 5.9 (H) 11/20/2019 08:26 AM    Hemoglobin A1c 5.8 (H) 05/14/2019 09:40 AM    Hemoglobin A1c 5.8 (H) 11/14/2018 09:03 AM      Lab Results   Component Value Date/Time    Cholesterol, total 181 11/20/2019 08:26 AM    HDL Cholesterol 54 11/20/2019 08:26 AM    LDL, calculated 108 (H) 11/20/2019 08:26 AM    VLDL, calculated 19 11/20/2019 08:26 AM    Triglyceride 94 11/20/2019 08:26 AM    CHOL/HDL Ratio 3.5 07/29/2010 09:06 AM          ASSESSMENT/PLAN  Diagnoses and all orders for this visit:    1. Acute non-recurrent sinusitis, unspecified location  -     amoxicillin-clavulanate (AUGMENTIN) 875-125 mg per tablet; Take 1 Tab by mouth every twelve (12) hours for 7 days. will take abx if symptoms worsen or last > 10 days      Health Maintenance Due   Topic Date Due    GLAUCOMA SCREENING Q2Y  08/28/2017        Follow-up and Dispositions    · Return if symptoms worsen or fail to improve. Reviewed plan of care. Patient has provided input and agrees with goals. The nurse provided the patient and/or family with advanced directive information if needed and encouraged the patient to provide a copy to the office when available.

## 2020-03-17 ENCOUNTER — TELEPHONE (OUTPATIENT)
Dept: INTERNAL MEDICINE CLINIC | Facility: CLINIC | Age: 80
End: 2020-03-17

## 2020-03-17 NOTE — TELEPHONE ENCOUNTER
One night of nights weats w/ no other sx's is not concerning. Monitor temp, stay hydrated, call us with any new/worsening sx's. Spoke with pt and relayed info. Also, c/o minor new lower back pain- thinks it could be pneumonia. Denies cough, UTI sx's. Advised pt to call us back with any new or worsening sx's. He will monitor sx's.

## 2020-03-17 NOTE — TELEPHONE ENCOUNTER
Patient called to say he was experiencing night sweats last night and wanted to see if it was something for him to be concerned about.   [phone]

## 2020-03-17 NOTE — TELEPHONE ENCOUNTER
VM left for pt. One night of nights weats w/ no other sx's is not concerning. Monitor temp, stay hydrated, call us with any new/worsening sx's.

## 2020-04-10 ENCOUNTER — VIRTUAL VISIT (OUTPATIENT)
Dept: INTERNAL MEDICINE CLINIC | Facility: CLINIC | Age: 80
End: 2020-04-10

## 2020-04-10 VITALS — HEIGHT: 71 IN | BODY MASS INDEX: 25.66 KG/M2

## 2020-04-10 DIAGNOSIS — Z13.31 SCREENING FOR DEPRESSION: ICD-10-CM

## 2020-04-10 DIAGNOSIS — R19.5 RED STOOL: Primary | ICD-10-CM

## 2020-04-10 NOTE — PROGRESS NOTES
Scar   Identified pt with two pt identifiers(name and ). Chief Complaint   Patient presents with    Rectal Bleeding     Been eating beets (5 or 6 slices) everyday // Took a laxative last night that started to work this morning        Reviewed record In preparation for visit and have obtained necessary documentation. 1. Have you been to the ER, urgent care clinic or hospitalized since your last visit? No     2. Have you seen or consulted any other health care providers outside of the [address] [place] [address] since your last visit? Include any pap smears or colon screening. No    Patient does not have an advance directive. Vitals reviewed with provider. Health Maintenance reviewed: There are no preventive care reminders to display for this patient.        Wt Readings from Last 3 Encounters:   20 184 lb (83.5 kg)   20 184 lb 8 oz (83.7 kg)   19 184 lb (83.5 kg)        Temp Readings from Last 3 Encounters:   20 97.7 °F (36.5 °C) (Oral)   20 98.1 °F (36.7 °C) (Oral)   19 97.6 °F (36.4 °C) (Oral)        BP Readings from Last 3 Encounters:   20 120/80   20 130/90   19 132/84        Pulse Readings from Last 3 Encounters:   20 88   20 78   19 68        Vitals:    04/10/20 1358   Height: 5' 11\" (1.803 m)   PainSc:   0 - No pain          Learning Assessment:   :       Learning Assessment 11/10/2014   PRIMARY LEARNER Patient   HIGHEST LEVEL OF EDUCATION - PRIMARY LEARNER  GRADUATED HIGH SCHOOL OR GED   BARRIERS PRIMARY LEARNER NONE     NONE   CO-LEARNER CAREGIVER No   CO-LEARNER NAME none   PRIMARY LANGUAGE ENGLISH   LEARNER PREFERENCE PRIMARY DEMONSTRATION   ANSWERED BY patients   RELATIONSHIP SELF        Depression Screening:   :       3 most recent PHQ Screens 4/10/2020   Little interest or pleasure in doing things Not at all   Feeling down, depressed, irritable, or hopeless Not at all   Total Score PHQ 2 0        Fall Risk Assessment:   :       Fall Risk Assessment, last 12 mths 4/10/2020   Able to walk? Yes   Fall in past 12 months? No   Fall with injury? -        Abuse Screening:   :       Abuse Screening Questionnaire 4/10/2020 10/28/2019 5/31/2018 11/28/2016 11/10/2014   Do you ever feel afraid of your partner? N N N N N   Are you in a relationship with someone who physically or mentally threatens you? N N N N N   Is it safe for you to go home?  Y Y Y Y Y        ADL Screening:   :       ADL Assessment 5/31/2018   Feeding yourself No Help Needed   Getting from bed to chair No Help Needed   Getting dressed No Help Needed   Bathing or showering No Help Needed   Walk across the room (includes cane/walker) No Help Needed   Using the telphone No Help Needed   Taking your medications No Help Needed   Preparing meals No Help Needed   Managing money (expenses/bills) No Help Needed   Moderately strenuous housework (laundry) No Help Needed   Shopping for personal items (toiletries/medicines) No Help Needed   Shopping for groceries No Help Needed   Driving No Help Needed   Climbing a flight of stairs No Help Needed   Getting to places beyond walking distances No Help Needed

## 2020-04-10 NOTE — PROGRESS NOTES
Jaspreet Guillen is a 70 y.o. male evaluated via telephone on 4/10/2020. Consent:  He and/or health care decision maker is aware that that he may receive a bill for this telephone service, depending on his insurance coverage, and has provided verbal consent to proceed: Yes      Documentation:  I communicated with the patient and/or health care decision maker about abnormal stool . Details of this discussion including any medical advice provided: It is possible that beets caused red stool. Will send FIT test for him to send in, although event may have passed by the time he receives. It.. He is to stop eating beets and if there is another red stool, he will need to go to ED, for possible diverticular bleed. He has been eating a lot of beets all week. Yesterday took a laxative (little pills; not sure of name)  to \"clean\" himself out. He does this once a month. He had loose stool this morning that was dark red. Toilet water did not turn particularly red. He is unsure if beets did this or this is blood. He had no pain with BM. He denies lightheadedness or weakness. He has a history of colon polyps. Last colonoscopy was in 2016 and there was moderate diverticulosis throughout colon. Next is due in 2021.     3 most recent PHQ Screens 4/10/2020   Little interest or pleasure in doing things Not at all   Feeling down, depressed, irritable, or hopeless Not at all   Total Score PHQ 2 0       I affirm this is a Patient Initiated Episode with an Established Patient who has not had a related appointment within my department in the past 7 days or scheduled within the next 24 hours.     Total Time: minutes: 11-20 minutes    Note: not billable if this call serves to triage the patient into an appointment for the relevant concern      Brianne Whaley MD

## 2020-04-13 ENCOUNTER — TELEPHONE (OUTPATIENT)
Dept: INTERNAL MEDICINE CLINIC | Facility: CLINIC | Age: 80
End: 2020-04-13

## 2020-04-13 NOTE — TELEPHONE ENCOUNTER
Pt called and stated that he had some questions about the stool kit that the nurse was suppose to be sending him

## 2020-04-13 NOTE — TELEPHONE ENCOUNTER
We have NO foot traffic here at the office.  Can  one from [facility] or wait for the one in the mail

## 2020-04-13 NOTE — TELEPHONE ENCOUNTER
Verified patients name and date of birth. Patient concerned that he has not received occult blood stool test. Explained to him that our mail goes to the hospital first and is then mailed out so he may not receive kit for a week. Patient is concerned and wants to know if he can  kit at office. Advised we are not seeing patients in the office, but I will check on wether or not he can pick the kit up at our office. Please advise.

## 2020-04-15 ENCOUNTER — TELEPHONE (OUTPATIENT)
Dept: INTERNAL MEDICINE CLINIC | Facility: CLINIC | Age: 80
End: 2020-04-15

## 2020-04-15 DIAGNOSIS — K92.1 HEMATOCHEZIA: Primary | ICD-10-CM

## 2020-04-15 LAB — HEMOCCULT STL QL IA: POSITIVE

## 2020-04-15 NOTE — PROGRESS NOTES
Stool is positve for blood. Is he still seeing blood with bowel movements? If so we need to get him seen at [facility] or GI (Dr Edward Yu). We also need to send to [employer] for CBC. I will order and we can mail slip to him.

## 2020-04-15 NOTE — TELEPHONE ENCOUNTER
Verified patients name and date of birth. Patient states he had \"quite a bit\" of blood in his stool this past Friday. Since then he states his stools have been very dark like tar. Please advise. He did complete the stool test and took it to [facility] on [facility].

## 2020-04-15 NOTE — TELEPHONE ENCOUNTER
[facility] with [facility] to see if hemoccult back. I don't see it. I would like him to have virtual visit with GI - he has a GI doctor already.

## 2020-04-15 NOTE — TELEPHONE ENCOUNTER
----- Message from Arlet Barbour sent at 4/15/2020  7:42 AM EDT -----  Regarding: Young/telephone  Pt stated he had blood in stool last Friday and he had a stool culture at [facility] yesterday. I was unable to reach the office. Pts number is [phone].

## 2020-04-15 NOTE — TELEPHONE ENCOUNTER
Verified patients name and date of birth. Advised him to call Dr Joseph Mercedes office per Dr Leroy Card to schedule a virtual visit. [address] site and results are not in system as of yet.  Patient stated he did have requisition for test and took both to [facility].

## 2020-04-16 RX ORDER — GLUCOSAMINE SULFATE 1500 MG
1000 POWDER IN PACKET (EA) ORAL DAILY
COMMUNITY

## 2020-04-16 RX ORDER — PANTOPRAZOLE SODIUM 40 MG/1
40 TABLET, DELAYED RELEASE ORAL DAILY
COMMUNITY

## 2020-04-16 RX ORDER — HYDROCHLOROTHIAZIDE 25 MG/1
12.5 TABLET ORAL DAILY
COMMUNITY

## 2020-04-16 RX ORDER — FLUTICASONE PROPIONATE 50 MCG
2 SPRAY, SUSPENSION (ML) NASAL DAILY
COMMUNITY

## 2020-04-16 RX ORDER — SERTRALINE HYDROCHLORIDE 100 MG/1
50 TABLET, FILM COATED ORAL
COMMUNITY

## 2020-04-16 NOTE — PROGRESS NOTES
Verified patients name and date of birth. Spoke with patients wife (on [employer]IP[employer]) and gave her test results. She stated patient has an appt with Dr Hola Canas this morning at [facility].

## 2020-04-16 NOTE — PROGRESS NOTES
Verified patients name and date of birth. Spoke with patients wife (on [employer]IPPA) and gave her test results. She stated patient has an appt with Dr Allyson Caballero this morning at [facility].

## 2020-04-16 NOTE — H&P
[address] [facility]  [address] [facility], [address]  [phone]                                History and Physical     NAME: Lawrence Adhikari   :  1940   MRN:  [id number]     HPI:  The patient was seen and examined. Past Surgical History:   Procedure Laterality Date    ABDOMEN SURGERY PROC UNLISTED      R inguinal hernia repair    HX CYST REMOVAL      cyst removal back    HX GI      colon polyp ; colonoscopy - needs every 5 yrs - 3/09 (-); repeat 3/2014 - Dr. Birdia Severance HX HEENT      L ear surgery - tympanomastoidectomy (revision 1st surgery  for \"hole in TM\"; s\p choleostoma; b/l cataract surgery - followed at [facility]      Past Medical History:   Diagnosis Date    Arthritis     Asthma     BPH     Elevated PSA     GERD (gastroesophageal reflux disease)     Ill-defined condition     elevated BP when pt goes into a doctor's office, but states it goes down when checked again    Peyronie disease      Social History     Tobacco Use    Smoking status: Former Smoker     Last attempt to quit: 1970     Years since quittin.3    Smokeless tobacco: Never Used    Tobacco comment: started age [age] - <1ppd stopped    Substance Use Topics    Alcohol use: Not Currently    Drug use: No     Allergies   Allergen Reactions    Melon Flavor Swelling     Facial swelling with watermelon and cantaloupe     Family History   Problem Relation Age of Onset    Cancer Mother         gall baldder     No current facility-administered medications for this encounter. Current Outpatient Medications   Medication Sig    pantoprazole (PROTONIX) 40 mg tablet Take 40 mg by mouth daily.  hydroCHLOROthiazide (HYDRODIURIL) 25 mg tablet Take 12.5 mg by mouth daily.  docosahexaenoic acid/epa (FISH OIL PO) Take 1,000 mg by mouth daily.  cholecalciferol (Vitamin D3) 25 mcg (1,000 unit) cap Take 1,000 Units by mouth daily.     fluticasone propionate (FLONASE) 50 mcg/actuation nasal spray 2 Sprays by Both Nostrils route daily.  mometasone (ASMANEX TWISTHALER) 220 mcg/ actuation (60) inhaler Take 2 Puffs by inhalation daily as needed.  sertraline (Zoloft) 100 mg tablet Take 50 mg by mouth daily as needed.  tamsulosin (FLOMAX) 0.4 mg capsule Take 1 Cap by mouth nightly.  albuterol (PROVENTIL HFA, VENTOLIN HFA, PROAIR HFA) 90 mcg/actuation inhaler Take 1 Puff by inhalation every four (4) hours as needed for Wheezing.  finasteride (PROSCAR) 5 mg tablet Take 5 mg by mouth daily.  psyllium (METAMUCIL) powd Take  by mouth every other day.  Cetirizine (ZYRTEC) 10 mg cap Take 10 mg by mouth daily.  MULTIVITAMINS (MULTIVITAMIN PO) Take  by mouth daily. Takes one po once daily. PHYSICAL EXAM:  General: WD, WN. Alert, cooperative, no acute distress    HEENT: NC, Atraumatic. PERRLA, EOMI. Anicteric sclerae. Lungs:  CTA Bilaterally. No Wheezing/Rhonchi/Rales. Heart:  Regular  rhythm,  No murmur, No Rubs, No Gallops  Abdomen: Soft, Non distended, Non tender.  +Bowel sounds, no HSM  Extremities: No c/c/e  Neurologic:  CN 2-12 gi, Alert and oriented X 3. No acute neurological distress   Psych:   Good insight. Not anxious nor agitated. The heart, lungs and mental status were satisfactory for the administration of MAC sedation and for the procedure.       Mallampati score: 2       Assessment:   · melena    Plan:   · Endoscopic procedure :egd/colon  · MAC sedation

## 2020-04-17 ENCOUNTER — HOSPITAL ENCOUNTER (OUTPATIENT)
Age: 80
Setting detail: OUTPATIENT SURGERY
Discharge: HOME | End: 2020-04-17
Attending: INTERNAL MEDICINE | Admitting: INTERNAL MEDICINE
Payer: MEDICARE

## 2020-04-17 ENCOUNTER — ANESTHESIA (OUTPATIENT)
Dept: ENDOSCOPY | Age: 80
End: 2020-04-17
Payer: MEDICARE

## 2020-04-17 ENCOUNTER — ANESTHESIA EVENT (OUTPATIENT)
Dept: ENDOSCOPY | Age: 80
End: 2020-04-17
Payer: MEDICARE

## 2020-04-17 VITALS
WEIGHT: 180 LBS | HEIGHT: 71 IN | HEART RATE: 74 BPM | DIASTOLIC BLOOD PRESSURE: 66 MMHG | BODY MASS INDEX: 25.2 KG/M2 | OXYGEN SATURATION: 100 % | TEMPERATURE: 97.8 F | SYSTOLIC BLOOD PRESSURE: 116 MMHG | RESPIRATION RATE: 18 BRPM

## 2020-04-17 PROCEDURE — 74011250636 HC RX REV CODE- 250/636: Performed by: NURSE ANESTHETIST, CERTIFIED REGISTERED

## 2020-04-17 PROCEDURE — 76040000007: Performed by: INTERNAL MEDICINE

## 2020-04-17 PROCEDURE — 74011250637 HC RX REV CODE- 250/637: Performed by: INTERNAL MEDICINE

## 2020-04-17 PROCEDURE — 74011250636 HC RX REV CODE- 250/636: Performed by: INTERNAL MEDICINE

## 2020-04-17 PROCEDURE — 76060000032 HC ANESTHESIA 0.5 TO 1 HR: Performed by: INTERNAL MEDICINE

## 2020-04-17 PROCEDURE — 74011000250 HC RX REV CODE- 250: Performed by: NURSE ANESTHETIST, CERTIFIED REGISTERED

## 2020-04-17 RX ORDER — SODIUM CHLORIDE 9 MG/ML
INJECTION, SOLUTION INTRAVENOUS
Status: DISCONTINUED | OUTPATIENT
Start: 2020-04-17 | End: 2020-04-17 | Stop reason: HOSPADM

## 2020-04-17 RX ORDER — EPINEPHRINE 0.1 MG/ML
1 INJECTION INTRACARDIAC; INTRAVENOUS
Status: DISCONTINUED | OUTPATIENT
Start: 2020-04-17 | End: 2020-04-17 | Stop reason: HOSPADM

## 2020-04-17 RX ORDER — SODIUM CHLORIDE 0.9 % (FLUSH) 0.9 %
5-40 SYRINGE (ML) INJECTION EVERY 8 HOURS
Status: DISCONTINUED | OUTPATIENT
Start: 2020-04-17 | End: 2020-04-17 | Stop reason: HOSPADM

## 2020-04-17 RX ORDER — MIDAZOLAM HYDROCHLORIDE 1 MG/ML
.25-1 INJECTION, SOLUTION INTRAMUSCULAR; INTRAVENOUS
Status: DISCONTINUED | OUTPATIENT
Start: 2020-04-17 | End: 2020-04-17 | Stop reason: HOSPADM

## 2020-04-17 RX ORDER — SODIUM CHLORIDE 9 MG/ML
50 INJECTION, SOLUTION INTRAVENOUS CONTINUOUS
Status: DISCONTINUED | OUTPATIENT
Start: 2020-04-17 | End: 2020-04-17 | Stop reason: HOSPADM

## 2020-04-17 RX ORDER — FLUMAZENIL 0.1 MG/ML
0.2 INJECTION INTRAVENOUS
Status: DISCONTINUED | OUTPATIENT
Start: 2020-04-17 | End: 2020-04-17 | Stop reason: HOSPADM

## 2020-04-17 RX ORDER — SODIUM CHLORIDE 0.9 % (FLUSH) 0.9 %
5-40 SYRINGE (ML) INJECTION AS NEEDED
Status: DISCONTINUED | OUTPATIENT
Start: 2020-04-17 | End: 2020-04-17 | Stop reason: HOSPADM

## 2020-04-17 RX ORDER — FENTANYL CITRATE 50 UG/ML
100 INJECTION, SOLUTION INTRAMUSCULAR; INTRAVENOUS
Status: DISCONTINUED | OUTPATIENT
Start: 2020-04-17 | End: 2020-04-17 | Stop reason: HOSPADM

## 2020-04-17 RX ORDER — DEXTROMETHORPHAN/PSEUDOEPHED 2.5-7.5/.8
1.2 DROPS ORAL
Status: DISCONTINUED | OUTPATIENT
Start: 2020-04-17 | End: 2020-04-17 | Stop reason: HOSPADM

## 2020-04-17 RX ORDER — ATROPINE SULFATE 0.1 MG/ML
0.5 INJECTION INTRAVENOUS
Status: DISCONTINUED | OUTPATIENT
Start: 2020-04-17 | End: 2020-04-17 | Stop reason: HOSPADM

## 2020-04-17 RX ORDER — NALOXONE HYDROCHLORIDE 0.4 MG/ML
0.4 INJECTION, SOLUTION INTRAMUSCULAR; INTRAVENOUS; SUBCUTANEOUS
Status: DISCONTINUED | OUTPATIENT
Start: 2020-04-17 | End: 2020-04-17 | Stop reason: HOSPADM

## 2020-04-17 RX ORDER — PROPOFOL 10 MG/ML
INJECTION, EMULSION INTRAVENOUS AS NEEDED
Status: DISCONTINUED | OUTPATIENT
Start: 2020-04-17 | End: 2020-04-17 | Stop reason: HOSPADM

## 2020-04-17 RX ORDER — LIDOCAINE HYDROCHLORIDE 20 MG/ML
INJECTION, SOLUTION EPIDURAL; INFILTRATION; INTRACAUDAL; PERINEURAL AS NEEDED
Status: DISCONTINUED | OUTPATIENT
Start: 2020-04-17 | End: 2020-04-17 | Stop reason: HOSPADM

## 2020-04-17 RX ADMIN — PROPOFOL 30 MG: 10 INJECTION, EMULSION INTRAVENOUS at 07:34

## 2020-04-17 RX ADMIN — PROPOFOL 20 MG: 10 INJECTION, EMULSION INTRAVENOUS at 07:38

## 2020-04-17 RX ADMIN — PROPOFOL 70 MG: 10 INJECTION, EMULSION INTRAVENOUS at 07:33

## 2020-04-17 RX ADMIN — PROPOFOL 20 MG: 10 INJECTION, EMULSION INTRAVENOUS at 07:44

## 2020-04-17 RX ADMIN — PROPOFOL 30 MG: 10 INJECTION, EMULSION INTRAVENOUS at 07:48

## 2020-04-17 RX ADMIN — PROPOFOL 20 MG: 10 INJECTION, EMULSION INTRAVENOUS at 07:50

## 2020-04-17 RX ADMIN — SODIUM CHLORIDE: 900 INJECTION, SOLUTION INTRAVENOUS at 07:25

## 2020-04-17 RX ADMIN — PROPOFOL 30 MG: 10 INJECTION, EMULSION INTRAVENOUS at 07:37

## 2020-04-17 RX ADMIN — LIDOCAINE HYDROCHLORIDE 50 MG: 20 INJECTION, SOLUTION EPIDURAL; INFILTRATION; INTRACAUDAL; PERINEURAL at 07:33

## 2020-04-17 RX ADMIN — PROPOFOL 30 MG: 10 INJECTION, EMULSION INTRAVENOUS at 07:42

## 2020-04-17 RX ADMIN — PROPOFOL 30 MG: 10 INJECTION, EMULSION INTRAVENOUS at 07:41

## 2020-04-17 RX ADMIN — PROPOFOL 30 MG: 10 INJECTION, EMULSION INTRAVENOUS at 07:46

## 2020-04-17 RX ADMIN — PROPOFOL 20 MG: 10 INJECTION, EMULSION INTRAVENOUS at 07:36

## 2020-04-17 RX ADMIN — PROPOFOL 30 MG: 10 INJECTION, EMULSION INTRAVENOUS at 07:43

## 2020-04-17 RX ADMIN — PROPOFOL 20 MG: 10 INJECTION, EMULSION INTRAVENOUS at 07:39

## 2020-04-17 NOTE — ANESTHESIA PREPROCEDURE EVALUATION
Relevant Problems   No relevant active problems       Anesthetic History   No history of anesthetic complications            Review of Systems / Medical History  Patient summary reviewed, nursing notes reviewed and pertinent labs reviewed    Pulmonary  Within defined limits          Asthma        Neuro/Psych   Within defined limits           Cardiovascular  Within defined limits  Hypertension                   GI/Hepatic/Renal  Within defined limits   GERD           Endo/Other  Within defined limits      Arthritis     Other Findings              Physical Exam    Airway  Mallampati: II  TM Distance: > 6 cm  Neck ROM: normal range of motion   Mouth opening: Normal     Cardiovascular  Regular rate and rhythm,  S1 and S2 normal,  no murmur, click, rub, or gallop             Dental  No notable dental hx       Pulmonary  Breath sounds clear to auscultation               Abdominal  GI exam deferred       Other Findings            Anesthetic Plan    ASA: 2  Anesthesia type: MAC            Anesthetic plan and risks discussed with: Patient

## 2020-04-17 NOTE — DISCHARGE INSTRUCTIONS
[address]  [address], [address]  [phone]                                  Chapo Jalloh  [id number]  1940    It was my pleasure seeing you for your procedure. You will also receive a summary report with the findings from this procedure and any further recommendations. If you had polyps removed or biopsies taken during your procedure, you will receive a separate letter from me within the next 2 weeks. If you don't receive this letter or if you have any questions, please call my office [phone]. Please take note of the post procedure instructions listed below. Best Wishes,    Dr. Austin Diaz    These instructions give you information on caring for yourself after your procedure. Call your doctor if you have any problems or questions after your procedure. HOME CARE  · Walk if you have belly cramping or gas. Walking will help get rid of the air and reduce the bloated feeling in your belly (abdomen). · Your IV site (where you received drugs) may be tender to touch. Place warm towels on the site; keep your arm up on two pillows if you have any swelling or soreness in the area. · You may shower. ACTIVITY:  · Take frequent rest periods and move at a slower pace for the next 24 hours. .  · You may resume your regular activity tomorrow if you are feeling back to normal.  · Do not drive or ride a bicycle for at least 24 hours (because of the medicine (anesthesia) used during the test). · Do not sign any important legal documents or use or operate any machinery for 24 hours  · Do not take sleeping medicines/nerve drugs for 24 hours unless the doctor tells you. · You can return to work/school tomorrow unless otherwise instructed. NUTRITION:  · Drink plenty of fluids to keep your pee (urine) clear or pale yellow  · Begin with a light meal and progress to your normal diet.  Heavy or fried foods are harder to digest and may make you feel sick to your stomach (nauseated). · Once you are feeling back to normal, you may resume your normal diet as instructed by your doctor. · Avoid alcoholic beverages for 24 hours or as instructed. IF YOU HAD BIOPSIES TAKEN OR POLYPS REMOVED DURING THE PROCEDURE:  · For the next 7 days, avoid all non-steroidal antiinflammatory medications such as Ibuprofen, Motrin, Advil, Alleve, Laura-seltzer, Goody's powder, BC powder. · If you do not have an heart condition that requires you to take a daily aspirin, you should avoid taking aspirin for 7 days. · Eat a soft diet for 24 hours. · Monitor your stools for any blood or dark black, tar-like, stools as this may be a sign of bleeding and if you see any blood, notify your doctor immediately. GET HELP RIGHT AWAY AND SEEK IMMEDIATE MEDICAL CARE IF:  · You have more than a spotting of blood in your stool. · You pass clumps of tissue (blood clots) or fill the toilet with blood. · Your belly is painfully swollen or puffy (abdominal distention). · You throw up (vomit). · You have a fever. · You have redness, pain or swelling at the IV site that last greater than two days. · You have abdominal pain or discomfort that is severe or gets worse throughout the day. Post-procedure diagnosis:    1)hiatal hernia  2)diverticulosis    Post-procedure recommendations:  EGD Findings:  Esophagus:normal, small (2 cm) sliding hiatal hernia  Stomach:normal   Duodenum/jejunum:normal    Colon Findings:   Rectum: small internal hemorrhoids  Sigmoid: moderate diverticulosis  Descending Colon:moderate diverticulosis  Transverse Colon: moderate diverticulosis  Ascending Colon: moderate diverticulosis  Cecum: normal  Terminal Ileum: normal    Recommendations:   - No source of GI bleeding. Hb 12.8 yesterday. - Resume normal medications.  - Noting age, no further colonoscopies needed for screening purposes.

## 2020-04-17 NOTE — PROCEDURES
[address] [facility] [address] [facility] [address]  [address], [address]  [phone]                           Colonoscopy and EGD Procedure Note      Indications:  Melena, last colonoscopy 2016     :  Kj Wakefield MD    Referring Provider: Kathi Zhang MD    Sedation:  MAC anesthesia    Procedure Details:  After informed consent was obtained with all risks and benefits of procedure explained and pre-operative exam completed, pt was placed in the left lateral decubitus position. Following sequential administration of sedation as per above, the gastroscope was inserted into the mouth and advanced under direct vision to second portion of the duodenum. A careful inspection was made as the gastroscope was withdrawn, including a retroflexed view of the proximal stomach; findings and interventions are described below. EGD Findings:  Esophagus:normal, small (2 cm) sliding hiatal hernia  Stomach:normal   Duodenum/jejunum:normal    EGD Interventions:  none    The bed was then turned and upon sequential sedation as per above, a digital rectal exam was performed per below. The Olympus videocolonoscope was inserted in the rectum and carefully advanced to the terminal ileum. The quality of preparation was excellent. [place] Bowel Prep Score 3/3/3. The colonoscope was slowly withdrawn with careful evaluation between folds. Retroflexion in the rectum was performed. Colon Findings:   Rectum: small internal hemorrhoids  Sigmoid: moderate diverticulosis  Descending Colon:moderate diverticulosis  Transverse Colon: moderate diverticulosis  Ascending Colon: moderate diverticulosis  Cecum: normal  Terminal Ileum: normal    Colonoscopy Interventions:  none           Specimens Removed:  * No specimens in log *    Complications: None. EBL:  none    Impression:    See Postoperative diagnosis above    Recommendations:   - No source of GI bleeding. Hb 12.8 yesterday. - Resume normal medications.   - Noting age, no further colonoscopies needed for screening purposes. Discharge Disposition:  Home in the company of a  when able to ambulate.     Kj Wakefield MD  4/17/2020  7:56 AM

## 2020-04-17 NOTE — ANESTHESIA POSTPROCEDURE EVALUATION
Procedure(s):  ESOPHAGOGASTRODUODENOSCOPY (EGD)  COLONOSCOPY. MAC    <BSHSIANPOST>    Vitals Value Taken Time   BP 83/59 4/17/2020  8:05 AM   Temp     Pulse 84 4/17/2020  8:05 AM   Resp 17 4/17/2020  8:05 AM   SpO2 99 % 4/17/2020  8:05 AM   Vitals shown include unvalidated device data.

## 2020-04-17 NOTE — ROUTINE PROCESS
[facility]  1940  [id number]    Situation:  Verbal report received from: Danny Hoang  Procedure: Procedure(s):  ESOPHAGOGASTRODUODENOSCOPY (EGD)  COLONOSCOPY    Background:    Preoperative diagnosis: Melena (Essential)  Postoperative diagnosis: 1)hiatal hernia  2)diverticulosis      :  Dr. Yuko Santos  Assistant(s): Endoscopy Technician-1: Holley Jones  Endoscopy RN-1: Radha Liriano RN    Specimens: * No specimens in log *  H. Pylori  no    Assessment:    Anesthesia gave intra-procedure sedation and medications, see anesthesia flow sheet yes    Intravenous fluids: NS@ KVO     Vital signs stable yes    Abdominal assessment: round and soft yes    Recommendation:  Discharge patient per MD order    Family -daughter  Permission to share finding with family yes

## 2020-09-28 ENCOUNTER — DOCUMENTATION ONLY (OUTPATIENT)
Dept: NEUROLOGY | Facility: CLINIC | Age: 80
End: 2020-09-28

## 2020-09-28 ENCOUNTER — OFFICE VISIT (OUTPATIENT)
Dept: NEUROLOGY | Facility: CLINIC | Age: 80
End: 2020-09-28
Payer: MEDICARE

## 2020-09-28 VITALS
DIASTOLIC BLOOD PRESSURE: 80 MMHG | SYSTOLIC BLOOD PRESSURE: 126 MMHG | OXYGEN SATURATION: 98 % | BODY MASS INDEX: 25.1 KG/M2 | WEIGHT: 180 LBS | RESPIRATION RATE: 18 BRPM | HEART RATE: 81 BPM

## 2020-09-28 DIAGNOSIS — S06.5XAA SUBDURAL HEMATOMA: ICD-10-CM

## 2020-09-28 DIAGNOSIS — F07.81 POST CONCUSSIVE SYNDROME: Primary | ICD-10-CM

## 2020-09-28 PROCEDURE — G8432 DEP SCR NOT DOC, RNG: HCPCS | Performed by: PSYCHIATRY & NEUROLOGY

## 2020-09-28 PROCEDURE — G8419 CALC BMI OUT NRM PARAM NOF/U: HCPCS | Performed by: PSYCHIATRY & NEUROLOGY

## 2020-09-28 PROCEDURE — G8752 SYS BP LESS 140: HCPCS | Performed by: PSYCHIATRY & NEUROLOGY

## 2020-09-28 PROCEDURE — 1101F PT FALLS ASSESS-DOCD LE1/YR: CPT | Performed by: PSYCHIATRY & NEUROLOGY

## 2020-09-28 PROCEDURE — G8754 DIAS BP LESS 90: HCPCS | Performed by: PSYCHIATRY & NEUROLOGY

## 2020-09-28 PROCEDURE — G8427 DOCREV CUR MEDS BY ELIG CLIN: HCPCS | Performed by: PSYCHIATRY & NEUROLOGY

## 2020-09-28 PROCEDURE — G8536 NO DOC ELDER MAL SCRN: HCPCS | Performed by: PSYCHIATRY & NEUROLOGY

## 2020-09-28 PROCEDURE — 99205 OFFICE O/P NEW HI 60 MIN: CPT | Performed by: PSYCHIATRY & NEUROLOGY

## 2020-09-28 RX ORDER — PREDNISONE 10 MG/1
TABLET ORAL
Status: ON HOLD | COMMUNITY
End: 2020-12-10

## 2020-09-28 RX ORDER — CYCLOBENZAPRINE HCL 10 MG
TABLET ORAL
Status: ON HOLD | COMMUNITY
End: 2020-12-10
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
## 2020-09-28 NOTE — PATIENT INSTRUCTIONS
PRESCRIPTION REFILL POLICY [facility] Statement to Patients April 1, 2014 In an effort to ensure the large volume of patient prescription refills is processed in the most efficient and expeditious manner, we are asking our patients to assist us by calling your Pharmacy for all prescription refills, this will include also your  Mail Order Pharmacy. The pharmacy will contact our office electronically to continue the refill process. Please do not wait until the last minute to call your pharmacy. We need at least 48 hours (2days) to fill prescriptions. We also encourage you to call your pharmacy before going to  your prescription to make sure it is ready. With regard to controlled substance prescription refill requests (narcotic refills) that need to be picked up at our office, we ask your cooperation by providing us with at least 72 hours (3days) notice that you will need a refill. We will not refill narcotic prescription refill requests after 4:00pm on any weekday, Monday through Thursday, or after 2:00pm on Fridays, or on the weekends. We encourage everyone to explore another way of getting your prescription refill request processed using T[employer], our patient web portal through our electronic medical record system. [employer] is an efficient and effective way to communicate your medication request directly to the office and  downloadable as an oumar on your smart phone . [employer] also features a review functionality that allows you to view your medication list as well as leave messages for your physician. Are you ready to get connected? If so please review the attatched instructions or speak to any of our staff to get you set up right away! Thank you so much for your cooperation. Should you have any questions please contact our Practice Administrator. The Physicians and Staff,  [facility]

## 2020-11-05 ENCOUNTER — TRANSCRIBE ORDER (OUTPATIENT)
Dept: SCHEDULING | Age: 80
End: 2020-11-05

## 2020-11-05 DIAGNOSIS — S06.5XAA SDH (SUBDURAL HEMATOMA): Primary | ICD-10-CM

## 2020-11-10 ENCOUNTER — HOSPITAL ENCOUNTER (OUTPATIENT)
Dept: CT IMAGING | Age: 80
Discharge: HOME | End: 2020-11-10
Attending: NEUROLOGICAL SURGERY
Payer: COMMERCIAL

## 2020-11-10 DIAGNOSIS — S06.5XAA SDH (SUBDURAL HEMATOMA): ICD-10-CM

## 2020-11-10 PROCEDURE — 70450 CT HEAD/BRAIN W/O DYE: CPT

## 2020-11-20 ENCOUNTER — OFFICE VISIT (OUTPATIENT)
Dept: NEUROSURGERY | Age: 80
End: 2020-11-20
Payer: MEDICARE

## 2020-11-20 VITALS
DIASTOLIC BLOOD PRESSURE: 74 MMHG | TEMPERATURE: 98.1 F | OXYGEN SATURATION: 98 % | SYSTOLIC BLOOD PRESSURE: 128 MMHG | HEIGHT: 71 IN | HEART RATE: 90 BPM | WEIGHT: 174 LBS | BODY MASS INDEX: 24.36 KG/M2

## 2020-11-20 DIAGNOSIS — S06.5XAA SUBDURAL HEMATOMA: Primary | ICD-10-CM

## 2020-11-20 PROCEDURE — G8754 DIAS BP LESS 90: HCPCS | Performed by: RADIOLOGY

## 2020-11-20 PROCEDURE — G8536 NO DOC ELDER MAL SCRN: HCPCS | Performed by: RADIOLOGY

## 2020-11-20 PROCEDURE — G8752 SYS BP LESS 140: HCPCS | Performed by: RADIOLOGY

## 2020-11-20 PROCEDURE — G8427 DOCREV CUR MEDS BY ELIG CLIN: HCPCS | Performed by: RADIOLOGY

## 2020-11-20 PROCEDURE — 1101F PT FALLS ASSESS-DOCD LE1/YR: CPT | Performed by: RADIOLOGY

## 2020-11-20 PROCEDURE — G8432 DEP SCR NOT DOC, RNG: HCPCS | Performed by: RADIOLOGY

## 2020-11-20 PROCEDURE — 99204 OFFICE O/P NEW MOD 45 MIN: CPT | Performed by: RADIOLOGY

## 2020-11-20 PROCEDURE — G8420 CALC BMI NORM PARAMETERS: HCPCS | Performed by: RADIOLOGY

## 2020-11-20 RX ORDER — LEVETIRACETAM 500 MG/1
500 TABLET ORAL EVERY 12 HOURS
COMMUNITY
Start: 2020-10-25

## 2020-11-20 NOTE — PATIENT INSTRUCTIONS
A Healthy Lifestyle: Care Instructions  Your Care Instructions     A healthy lifestyle can help you feel good, stay at a healthy weight, and have plenty of energy for both work and play. A healthy lifestyle is something you can share with your whole family. A healthy lifestyle also can lower your risk for serious health problems, such as high blood pressure, heart disease, and diabetes. You can follow a few steps listed below to improve your health and the health of your family. Follow-up care is a key part of your treatment and safety. Be sure to make and go to all appointments, and call your doctor if you are having problems. It's also a good idea to know your test results and keep a list of the medicines you take. How can you care for yourself at home? · Do not eat too much sugar, fat, or fast foods. You can still have dessert and treats now and then. The goal is moderation. · Start small to improve your eating habits. Pay attention to portion sizes, drink less juice and soda pop, and eat more fruits and vegetables. ? Eat a healthy amount of food. A 3-ounce serving of meat, for example, is about the size of a deck of cards. Fill the rest of your plate with vegetables and whole grains. ? Limit the amount of soda and sports drinks you have every day. Drink more water when you are thirsty. ? Eat at least 5 servings of fruits and vegetables every day. It may seem like a lot, but it is not hard to reach this goal. A serving or helping is 1 piece of fruit, 1 cup of vegetables, or 2 cups of leafy, raw vegetables. Have an apple or some carrot sticks as an afternoon snack instead of a candy bar. Try to have fruits and/or vegetables at every meal.  · Make exercise part of your daily routine. You may want to start with simple activities, such as walking, bicycling, or slow swimming. Try to be active 30 to 60 minutes every day. You do not need to do all 30 to 60 minutes all at once.  For example, you can exercise 3 times a day for 10 or 20 minutes. Moderate exercise is safe for most people, but it is always a good idea to talk to your doctor before starting an exercise program.  · Keep moving. Eddi Stake the lawn, work in the garden, or Only-apartments. Take the stairs instead of the elevator at work. · If you smoke, quit. People who smoke have an increased risk for heart attack, stroke, cancer, and other lung illnesses. Quitting is hard, but there are ways to boost your chance of quitting tobacco for good. ? Use nicotine gum, patches, or lozenges. ? Ask your doctor about stop-smoking programs and medicines. ? Keep trying. In addition to reducing your risk of diseases in the future, you will notice some benefits soon after you stop using tobacco. If you have shortness of breath or asthma symptoms, they will likely get better within a few weeks after you quit. · Limit how much alcohol you drink. Moderate amounts of alcohol (up to 2 drinks a day for men, 1 drink a day for women) are okay. But drinking too much can lead to liver problems, high blood pressure, and other health problems. Family health  If you have a family, there are many things you can do together to improve your health. · Eat meals together as a family as often as possible. · Eat healthy foods. This includes fruits, vegetables, lean meats and dairy, and whole grains. · Include your family in your fitness plan. Most people think of activities such as jogging or tennis as the way to fitness, but there are many ways you and your family can be more active. Anything that makes you breathe hard and gets your heart pumping is exercise. Here are some tips:  ? Walk to do errands or to take your child to school or the bus.  ? Go for a family bike ride after dinner instead of watching TV. Where can you learn more?   Go to http://www.gray.com/  Enter C957 in the search box to learn more about \"A Healthy Lifestyle: Care Instructions. \"  Current as of: January 31, 2020               Content Version: 12.6  © 6242-3604 [employer]. Care instructions adapted under license by [employer] (which disclaims liability or warranty for this information). If you have questions about a medical condition or this instruction, always ask your healthcare professional. [place] [address] any warranty or liability for your use of this information.
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
## 2020-11-25 ENCOUNTER — TELEPHONE (OUTPATIENT)
Dept: NEUROSURGERY | Age: 80
End: 2020-11-25

## 2020-11-25 DIAGNOSIS — S06.5XAA SUBDURAL HEMATOMA: Primary | ICD-10-CM

## 2020-11-25 NOTE — TELEPHONE ENCOUNTER
Spoke to daughter, Kenny Valdez to confirm procedure date of 12/10/2020 at [facility]. Arrival time 8:30 am.  Informed daughter she would receive a call closer to procedure date with instructions for lab work and COVID testing. Daughter stated understanding.

## 2020-11-30 ENCOUNTER — TELEPHONE (OUTPATIENT)
Dept: NEUROSURGERY | Age: 80
End: 2020-11-30

## 2020-11-30 NOTE — TELEPHONE ENCOUNTER
Returned call to daughter, Kenny Zoranreynaldo to clarify angiogram date and location. Daughter stated she received a call from [facility] stating the procedure would be done at another facility and with a different provider. Reassured daughter her father's angiogram is being performed at [facility] on 12/10/2020, 8:30 am arrival time by Dr Ronald Naqvi. Advised daughter to disregard any instructions from [facility]. All instructions will come from [facility] office. Daughter stated understanding.

## 2020-12-01 ENCOUNTER — TELEPHONE (OUTPATIENT)
Dept: NEUROSURGERY | Age: 80
End: 2020-12-01

## 2020-12-01 DIAGNOSIS — S06.5XAA SUBDURAL HEMATOMA: Primary | ICD-10-CM

## 2020-12-01 NOTE — TELEPHONE ENCOUNTER
Call and spoke with pt's daughter Pola Moya regarding pt upcoming procedure and need for labs and CoVID-19 testing. She states she will take her father for his labs and COVID-19 testing on either 12/5 or 12/7. She is aware that the pt will need to self quarantine after getting the test done until his procedure day. She will check with the pt and see which day he wants to go and will call us back to let us know.

## 2020-12-02 ENCOUNTER — TELEPHONE (OUTPATIENT)
Dept: NEUROSURGERY | Age: 80
End: 2020-12-02

## 2020-12-02 NOTE — TELEPHONE ENCOUNTER
Called and spoke to pt daughter Brett regarding increased turn around times on COVID-19 testing due to recent surge in cases. Instructed daughter that pt will need to get his COVID-19 testing 12/5/20 at the latest to ensure it will have resulted in time for his procedure to prevent any delays. She verbalized understanding, stated she would do so.

## 2020-12-05 ENCOUNTER — HOSPITAL ENCOUNTER (OUTPATIENT)
Dept: PREADMISSION TESTING | Age: 80
Discharge: HOME | End: 2020-12-05
Payer: MEDICARE

## 2020-12-05 ENCOUNTER — TRANSCRIBE ORDER (OUTPATIENT)
Dept: REGISTRATION | Age: 80
End: 2020-12-05

## 2020-12-05 DIAGNOSIS — Z01.812 PRE-PROCEDURE LAB EXAM: Primary | ICD-10-CM

## 2020-12-05 LAB
ALBUMIN SERPL-MCNC: 4.3 G/DL (ref 3.7–4.7)
ALBUMIN/GLOB SERPL: 2.2 {RATIO} (ref 1.2–2.2)
ALP SERPL-CCNC: 60 IU/L (ref 39–117)
ALT SERPL-CCNC: 26 IU/L (ref 0–44)
AST SERPL-CCNC: 17 IU/L (ref 0–40)
BILIRUB SERPL-MCNC: 0.2 MG/DL (ref 0–1.2)
BUN SERPL-MCNC: 14 MG/DL (ref 8–27)
BUN/CREAT SERPL: 16 (ref 10–24)
CALCIUM SERPL-MCNC: 9.4 MG/DL (ref 8.6–10.2)
CHLORIDE SERPL-SCNC: 103 MMOL/L (ref 96–106)
CO2 SERPL-SCNC: 28 MMOL/L (ref 20–29)
CREAT SERPL-MCNC: 0.89 MG/DL (ref 0.76–1.27)
ERYTHROCYTE [DISTWIDTH] IN BLOOD BY AUTOMATED COUNT: 13.1 % (ref 11.6–15.4)
GLOBULIN SER CALC-MCNC: 2 G/DL (ref 1.5–4.5)
GLUCOSE SERPL-MCNC: 78 MG/DL (ref 65–99)
HCT VFR BLD AUTO: 41 % (ref 37.5–51)
HGB BLD-MCNC: 13.4 G/DL (ref 13–17.7)
MCH RBC QN AUTO: 28.5 PG (ref 26.6–33)
MCHC RBC AUTO-ENTMCNC: 32.7 G/DL (ref 31.5–35.7)
MCV RBC AUTO: 87 FL (ref 79–97)
PLATELET # BLD AUTO: 267 X10E3/UL (ref 150–450)
POTASSIUM SERPL-SCNC: 4.3 MMOL/L (ref 3.5–5.2)
PROT SERPL-MCNC: 6.3 G/DL (ref 6–8.5)
RBC # BLD AUTO: 4.71 X10E6/UL (ref 4.14–5.8)
SODIUM SERPL-SCNC: 142 MMOL/L (ref 134–144)
WBC # BLD AUTO: 6.8 X10E3/UL (ref 3.4–10.8)

## 2020-12-05 PROCEDURE — 87635 SARS-COV-2 COVID-19 AMP PRB: CPT

## 2020-12-06 LAB — SARS-COV-2, COV2NT: NOT DETECTED

## 2020-12-09 ENCOUNTER — TELEPHONE (OUTPATIENT)
Dept: NEUROSURGERY | Age: 80
End: 2020-12-09

## 2020-12-09 ENCOUNTER — ANESTHESIA EVENT (OUTPATIENT)
Dept: INTERVENTIONAL RADIOLOGY/VASCULAR | Age: 80
DRG: 025 | End: 2020-12-09
Payer: MEDICARE

## 2020-12-09 NOTE — TELEPHONE ENCOUNTER
Received call from daughter Brett regarding patient's procedure tomorrow at [facility]. Arrival time 7:00 am.  Informed daughter COVID test was negative and his blood work was WNL. Informed daughter patient is to take morning medications with sips of water, no eating or drinking after midnight. Patient will have an overnight stay in ICU. Daughter stated understanding.

## 2020-12-10 ENCOUNTER — ANESTHESIA (OUTPATIENT)
Dept: INTERVENTIONAL RADIOLOGY/VASCULAR | Age: 80
DRG: 025 | End: 2020-12-10
Payer: MEDICARE

## 2020-12-10 ENCOUNTER — HOSPITAL ENCOUNTER (INPATIENT)
Dept: INTERVENTIONAL RADIOLOGY/VASCULAR | Age: 80
LOS: 7 days | Discharge: HOME HEALTH | DRG: 025 | End: 2020-12-17
Attending: RADIOLOGY | Admitting: NURSE PRACTITIONER
Payer: MEDICARE

## 2020-12-10 DIAGNOSIS — R42 DIZZINESS: ICD-10-CM

## 2020-12-10 DIAGNOSIS — Z71.89 ADVANCE CARE PLANNING: ICD-10-CM

## 2020-12-10 DIAGNOSIS — S06.5XAA SUBDURAL HEMATOMA: ICD-10-CM

## 2020-12-10 LAB
ACT BLD: 114 SECS (ref 79–138)
ERYTHROCYTE [DISTWIDTH] IN BLOOD BY AUTOMATED COUNT: 13.5 % (ref 11.5–14.5)
HCT VFR BLD AUTO: 42.4 % (ref 36.6–50.3)
HGB BLD-MCNC: 13.6 G/DL (ref 12.1–17)
MCH RBC QN AUTO: 29.1 PG (ref 26–34)
MCHC RBC AUTO-ENTMCNC: 32.1 G/DL (ref 30–36.5)
MCV RBC AUTO: 90.8 FL (ref 80–99)
NRBC # BLD: 0 K/UL (ref 0–0.01)
NRBC BLD-RTO: 0 PER 100 WBC
PLATELET # BLD AUTO: 233 K/UL (ref 150–400)
PMV BLD AUTO: 10.7 FL (ref 8.9–12.9)
RBC # BLD AUTO: 4.67 M/UL (ref 4.1–5.7)
WBC # BLD AUTO: 8.2 K/UL (ref 4.1–11.1)

## 2020-12-10 PROCEDURE — 74011250636 HC RX REV CODE- 250/636: Performed by: NURSE PRACTITIONER

## 2020-12-10 PROCEDURE — 65610000006 HC RM INTENSIVE CARE

## 2020-12-10 PROCEDURE — 77030012468 HC VLV BLEEDBK CNTRL ABBT -B

## 2020-12-10 PROCEDURE — 94640 AIRWAY INHALATION TREATMENT: CPT

## 2020-12-10 PROCEDURE — 85347 COAGULATION TIME ACTIVATED: CPT

## 2020-12-10 PROCEDURE — 0T9B70Z DRAINAGE OF BLADDER WITH DRAINAGE DEVICE, VIA NATURAL OR ARTIFICIAL OPENING: ICD-10-PCS | Performed by: RADIOLOGY

## 2020-12-10 PROCEDURE — 2709999900 HC NON-CHARGEABLE SUPPLY

## 2020-12-10 PROCEDURE — C1769 GUIDE WIRE: HCPCS

## 2020-12-10 PROCEDURE — 77030021532 HC CATH ANGI DX IMPRS MRTM -B

## 2020-12-10 PROCEDURE — 36224 PLACE CATH CAROTD ART: CPT

## 2020-12-10 PROCEDURE — 77030019940 HC BLNKT HYPOTHRM STRY -B

## 2020-12-10 PROCEDURE — 77030008638 HC TU CONN COOK -A

## 2020-12-10 PROCEDURE — 77030020268 HC MISC GENERAL SUPPLY

## 2020-12-10 PROCEDURE — B30C1ZZ PLAIN RADIOGRAPHY OF BILATERAL EXTERNAL CAROTID ARTERIES USING LOW OSMOLAR CONTRAST: ICD-10-PCS | Performed by: PSYCHIATRY & NEUROLOGY

## 2020-12-10 PROCEDURE — B30R1ZZ PLAIN RADIOGRAPHY OF INTRACRANIAL ARTERIES USING LOW OSMOLAR CONTRAST: ICD-10-PCS | Performed by: PSYCHIATRY & NEUROLOGY

## 2020-12-10 PROCEDURE — 61624 TCAT PERM OCCLS/EMBOLJ CNS: CPT | Performed by: RADIOLOGY

## 2020-12-10 PROCEDURE — 74011000250 HC RX REV CODE- 250: Performed by: RADIOLOGY

## 2020-12-10 PROCEDURE — 77030008684 HC TU ET CUF COVD -B: Performed by: ANESTHESIOLOGY

## 2020-12-10 PROCEDURE — C1887 CATHETER, GUIDING: HCPCS

## 2020-12-10 PROCEDURE — 76210000006 HC OR PH I REC 0.5 TO 1 HR

## 2020-12-10 PROCEDURE — 77030008584 HC TOOL GDWRE DEV TERU -A

## 2020-12-10 PROCEDURE — 74011250636 HC RX REV CODE- 250/636: Performed by: RADIOLOGY

## 2020-12-10 PROCEDURE — 85027 COMPLETE CBC AUTOMATED: CPT

## 2020-12-10 PROCEDURE — B3061ZZ PLAIN RADIOGRAPHY OF RIGHT INTERNAL CAROTID ARTERY USING LOW OSMOLAR CONTRAST: ICD-10-PCS | Performed by: PSYCHIATRY & NEUROLOGY

## 2020-12-10 PROCEDURE — 74011000636 HC RX REV CODE- 636: Performed by: RADIOLOGY

## 2020-12-10 PROCEDURE — 74011000250 HC RX REV CODE- 250: Performed by: NURSE PRACTITIONER

## 2020-12-10 PROCEDURE — 36415 COLL VENOUS BLD VENIPUNCTURE: CPT

## 2020-12-10 PROCEDURE — 77030026438 HC STYL ET INTUB CARD -A: Performed by: ANESTHESIOLOGY

## 2020-12-10 PROCEDURE — C1760 CLOSURE DEV, VASC: HCPCS

## 2020-12-10 PROCEDURE — 77030007948 HC PARTIC EMB CNTUR BSC -D

## 2020-12-10 PROCEDURE — B3051ZZ PLAIN RADIOGRAPHY OF BILATERAL COMMON CAROTID ARTERIES USING LOW OSMOLAR CONTRAST: ICD-10-PCS | Performed by: PSYCHIATRY & NEUROLOGY

## 2020-12-10 PROCEDURE — 03VG3DZ RESTRICTION OF INTRACRANIAL ARTERY WITH INTRALUMINAL DEVICE, PERCUTANEOUS APPROACH: ICD-10-PCS | Performed by: PSYCHIATRY & NEUROLOGY

## 2020-12-10 PROCEDURE — 74011250637 HC RX REV CODE- 250/637: Performed by: NURSE PRACTITIONER

## 2020-12-10 PROCEDURE — C1894 INTRO/SHEATH, NON-LASER: HCPCS

## 2020-12-10 PROCEDURE — 76060000036 HC ANESTHESIA 2.5 TO 3 HR

## 2020-12-10 RX ORDER — FLUTICASONE PROPIONATE 50 MCG
2 SPRAY, SUSPENSION (ML) NASAL DAILY
Status: DISCONTINUED | OUTPATIENT
Start: 2020-12-11 | End: 2020-12-17 | Stop reason: HOSPADM

## 2020-12-10 RX ORDER — ACETAMINOPHEN 325 MG/1
650 TABLET ORAL
Status: DISCONTINUED | OUTPATIENT
Start: 2020-12-10 | End: 2020-12-11

## 2020-12-10 RX ORDER — HEPARIN SODIUM 1000 [USP'U]/ML
INJECTION, SOLUTION INTRAVENOUS; SUBCUTANEOUS AS NEEDED
Status: DISCONTINUED | OUTPATIENT
Start: 2020-12-10 | End: 2020-12-10 | Stop reason: HOSPADM

## 2020-12-10 RX ORDER — FINASTERIDE 5 MG/1
5 TABLET, FILM COATED ORAL DAILY
Status: DISCONTINUED | OUTPATIENT
Start: 2020-12-11 | End: 2020-12-10

## 2020-12-10 RX ORDER — SERTRALINE HYDROCHLORIDE 50 MG/1
50 TABLET, FILM COATED ORAL DAILY
Status: DISCONTINUED | OUTPATIENT
Start: 2020-12-11 | End: 2020-12-10

## 2020-12-10 RX ORDER — TAMSULOSIN HYDROCHLORIDE 0.4 MG/1
0.4 CAPSULE ORAL
Status: DISCONTINUED | OUTPATIENT
Start: 2020-12-10 | End: 2020-12-17 | Stop reason: HOSPADM

## 2020-12-10 RX ORDER — SUCCINYLCHOLINE CHLORIDE 20 MG/ML
INJECTION INTRAMUSCULAR; INTRAVENOUS AS NEEDED
Status: DISCONTINUED | OUTPATIENT
Start: 2020-12-10 | End: 2020-12-10 | Stop reason: HOSPADM

## 2020-12-10 RX ORDER — SODIUM CHLORIDE, SODIUM LACTATE, POTASSIUM CHLORIDE, CALCIUM CHLORIDE 600; 310; 30; 20 MG/100ML; MG/100ML; MG/100ML; MG/100ML
INJECTION, SOLUTION INTRAVENOUS
Status: DISCONTINUED | OUTPATIENT
Start: 2020-12-10 | End: 2020-12-10 | Stop reason: HOSPADM

## 2020-12-10 RX ORDER — LEVETIRACETAM 500 MG/1
500 TABLET ORAL EVERY 12 HOURS
Status: DISCONTINUED | OUTPATIENT
Start: 2020-12-10 | End: 2020-12-17 | Stop reason: HOSPADM

## 2020-12-10 RX ORDER — LIDOCAINE HYDROCHLORIDE 20 MG/ML
INJECTION, SOLUTION EPIDURAL; INFILTRATION; INTRACAUDAL; PERINEURAL AS NEEDED
Status: DISCONTINUED | OUTPATIENT
Start: 2020-12-10 | End: 2020-12-10 | Stop reason: HOSPADM

## 2020-12-10 RX ORDER — FINASTERIDE 5 MG/1
5 TABLET, FILM COATED ORAL
Status: DISCONTINUED | OUTPATIENT
Start: 2020-12-10 | End: 2020-12-17 | Stop reason: HOSPADM

## 2020-12-10 RX ORDER — CETIRIZINE HCL 10 MG
10 TABLET ORAL DAILY
Status: DISCONTINUED | OUTPATIENT
Start: 2020-12-11 | End: 2020-12-17 | Stop reason: HOSPADM

## 2020-12-10 RX ORDER — SODIUM CHLORIDE, SODIUM LACTATE, POTASSIUM CHLORIDE, CALCIUM CHLORIDE 600; 310; 30; 20 MG/100ML; MG/100ML; MG/100ML; MG/100ML
100 INJECTION, SOLUTION INTRAVENOUS CONTINUOUS
Status: DISCONTINUED | OUTPATIENT
Start: 2020-12-10 | End: 2020-12-12

## 2020-12-10 RX ORDER — ONDANSETRON 2 MG/ML
4 INJECTION INTRAMUSCULAR; INTRAVENOUS
Status: DISCONTINUED | OUTPATIENT
Start: 2020-12-10 | End: 2020-12-17 | Stop reason: HOSPADM

## 2020-12-10 RX ORDER — ONDANSETRON 2 MG/ML
INJECTION INTRAMUSCULAR; INTRAVENOUS AS NEEDED
Status: DISCONTINUED | OUTPATIENT
Start: 2020-12-10 | End: 2020-12-10 | Stop reason: HOSPADM

## 2020-12-10 RX ORDER — BUDESONIDE 0.5 MG/2ML
500 INHALANT ORAL
Status: DISCONTINUED | OUTPATIENT
Start: 2020-12-10 | End: 2020-12-17 | Stop reason: HOSPADM

## 2020-12-10 RX ORDER — ROCURONIUM BROMIDE 10 MG/ML
INJECTION, SOLUTION INTRAVENOUS AS NEEDED
Status: DISCONTINUED | OUTPATIENT
Start: 2020-12-10 | End: 2020-12-10 | Stop reason: HOSPADM

## 2020-12-10 RX ORDER — EPHEDRINE SULFATE/0.9% NACL/PF 50 MG/5 ML
SYRINGE (ML) INTRAVENOUS
Status: DISPENSED
Start: 2020-12-10 | End: 2020-12-10

## 2020-12-10 RX ORDER — IPRATROPIUM BROMIDE AND ALBUTEROL SULFATE 2.5; .5 MG/3ML; MG/3ML
3 SOLUTION RESPIRATORY (INHALATION)
Status: DISCONTINUED | OUTPATIENT
Start: 2020-12-10 | End: 2020-12-17 | Stop reason: HOSPADM

## 2020-12-10 RX ORDER — NEOSTIGMINE METHYLSULFATE 1 MG/ML
INJECTION INTRAVENOUS AS NEEDED
Status: DISCONTINUED | OUTPATIENT
Start: 2020-12-10 | End: 2020-12-10 | Stop reason: HOSPADM

## 2020-12-10 RX ORDER — HYDRALAZINE HYDROCHLORIDE 20 MG/ML
10 INJECTION INTRAMUSCULAR; INTRAVENOUS
Status: DISCONTINUED | OUTPATIENT
Start: 2020-12-10 | End: 2020-12-17 | Stop reason: HOSPADM

## 2020-12-10 RX ORDER — LIDOCAINE HYDROCHLORIDE 20 MG/ML
20 INJECTION, SOLUTION INFILTRATION; PERINEURAL
Status: COMPLETED | OUTPATIENT
Start: 2020-12-10 | End: 2020-12-10

## 2020-12-10 RX ORDER — GLYCOPYRROLATE 0.2 MG/ML
INJECTION INTRAMUSCULAR; INTRAVENOUS AS NEEDED
Status: DISCONTINUED | OUTPATIENT
Start: 2020-12-10 | End: 2020-12-10 | Stop reason: HOSPADM

## 2020-12-10 RX ORDER — LIDOCAINE HYDROCHLORIDE 20 MG/ML
20 INJECTION, SOLUTION INFILTRATION; PERINEURAL ONCE
Status: DISCONTINUED | OUTPATIENT
Start: 2020-12-10 | End: 2020-12-10

## 2020-12-10 RX ORDER — PHENYLEPHRINE HCL IN 0.9% NACL 0.4MG/10ML
SYRINGE (ML) INTRAVENOUS AS NEEDED
Status: DISCONTINUED | OUTPATIENT
Start: 2020-12-10 | End: 2020-12-10 | Stop reason: HOSPADM

## 2020-12-10 RX ORDER — PANTOPRAZOLE SODIUM 40 MG/1
40 TABLET, DELAYED RELEASE ORAL
Status: DISCONTINUED | OUTPATIENT
Start: 2020-12-11 | End: 2020-12-17 | Stop reason: HOSPADM

## 2020-12-10 RX ORDER — LATANOPROST 50 UG/ML
1 SOLUTION/ DROPS OPHTHALMIC
COMMUNITY

## 2020-12-10 RX ORDER — PROPOFOL 10 MG/ML
INJECTION, EMULSION INTRAVENOUS AS NEEDED
Status: DISCONTINUED | OUTPATIENT
Start: 2020-12-10 | End: 2020-12-10 | Stop reason: HOSPADM

## 2020-12-10 RX ADMIN — BUDESONIDE 500 MCG: 0.5 INHALANT RESPIRATORY (INHALATION) at 21:07

## 2020-12-10 RX ADMIN — SUCCINYLCHOLINE CHLORIDE 120 MG: 20 INJECTION INTRAMUSCULAR; INTRAVENOUS at 08:28

## 2020-12-10 RX ADMIN — IOPAMIDOL 61 ML: 612 INJECTION, SOLUTION INTRAVENOUS at 10:49

## 2020-12-10 RX ADMIN — LIDOCAINE HYDROCHLORIDE 60 MG: 20 INJECTION, SOLUTION EPIDURAL; INFILTRATION; INTRACAUDAL; PERINEURAL at 08:27

## 2020-12-10 RX ADMIN — FINASTERIDE 5 MG: 5 TABLET, FILM COATED ORAL at 22:29

## 2020-12-10 RX ADMIN — HEPARIN SODIUM 2000 UNITS: 1000 INJECTION, SOLUTION INTRAVENOUS; SUBCUTANEOUS at 09:26

## 2020-12-10 RX ADMIN — LIDOCAINE HYDROCHLORIDE 20 MG: 20 INJECTION, SOLUTION INFILTRATION; PERINEURAL at 09:25

## 2020-12-10 RX ADMIN — ACETAMINOPHEN 650 MG: 325 TABLET ORAL at 22:25

## 2020-12-10 RX ADMIN — ROCURONIUM BROMIDE 40 MG: 10 INJECTION, SOLUTION INTRAVENOUS at 08:39

## 2020-12-10 RX ADMIN — TAMSULOSIN HYDROCHLORIDE 0.4 MG: 0.4 CAPSULE ORAL at 22:25

## 2020-12-10 RX ADMIN — HEPARIN SODIUM 20000 UNITS: 1000 INJECTION INTRAVENOUS; SUBCUTANEOUS at 08:27

## 2020-12-10 RX ADMIN — SODIUM CHLORIDE, SODIUM LACTATE, POTASSIUM CHLORIDE, CALCIUM CHLORIDE: 600; 310; 30; 20 INJECTION, SOLUTION INTRAVENOUS at 08:21

## 2020-12-10 RX ADMIN — SODIUM CHLORIDE, POTASSIUM CHLORIDE, SODIUM LACTATE AND CALCIUM CHLORIDE 100 ML/HR: 600; 310; 30; 20 INJECTION, SOLUTION INTRAVENOUS at 14:38

## 2020-12-10 RX ADMIN — GLYCOPYRROLATE 0.2 MG: 0.2 INJECTION INTRAMUSCULAR; INTRAVENOUS at 10:29

## 2020-12-10 RX ADMIN — SODIUM CHLORIDE, POTASSIUM CHLORIDE, SODIUM LACTATE AND CALCIUM CHLORIDE 100 ML/HR: 600; 310; 30; 20 INJECTION, SOLUTION INTRAVENOUS at 23:20

## 2020-12-10 RX ADMIN — PROPOFOL 120 MG: 10 INJECTION, EMULSION INTRAVENOUS at 08:28

## 2020-12-10 RX ADMIN — Medication 80 MCG: at 08:46

## 2020-12-10 RX ADMIN — ROCURONIUM BROMIDE 10 MG: 10 INJECTION, SOLUTION INTRAVENOUS at 08:28

## 2020-12-10 RX ADMIN — ONDANSETRON 4 MG: 2 INJECTION INTRAMUSCULAR; INTRAVENOUS at 10:33

## 2020-12-10 RX ADMIN — NEOSTIGMINE METHYLSULFATE 1.5 MG: 1 INJECTION INTRAVENOUS at 10:34

## 2020-12-10 RX ADMIN — ACETAMINOPHEN 650 MG: 325 TABLET ORAL at 16:44

## 2020-12-10 RX ADMIN — GLYCOPYRROLATE 0.2 MG: 0.2 INJECTION INTRAMUSCULAR; INTRAVENOUS at 10:37

## 2020-12-10 RX ADMIN — LEVETIRACETAM 500 MG: 500 TABLET ORAL at 18:56

## 2020-12-10 NOTE — ANESTHESIA PREPROCEDURE EVALUATION
Relevant Problems   No relevant active problems       Anesthetic History   No history of anesthetic complications            Review of Systems / Medical History  Patient summary reviewed, nursing notes reviewed and pertinent labs reviewed    Pulmonary  Within defined limits          Asthma        Neuro/Psych   Within defined limits           Cardiovascular  Within defined limits  Hypertension                   GI/Hepatic/Renal  Within defined limits   GERD           Endo/Other  Within defined limits      Arthritis     Other Findings              Physical Exam    Airway  Mallampati: II  TM Distance: > 6 cm  Neck ROM: normal range of motion   Mouth opening: Normal     Cardiovascular  Regular rate and rhythm,  S1 and S2 normal,  no murmur, click, rub, or gallop             Dental  No notable dental hx       Pulmonary  Breath sounds clear to auscultation               Abdominal  GI exam deferred       Other Findings            Anesthetic Plan    ASA: 4  Anesthesia type: general    Monitoring Plan: Arterial line      Induction: Intravenous  Anesthetic plan and risks discussed with: Patient

## 2020-12-10 NOTE — PROGRESS NOTES
Brief NIS Note    Patient doing well post-procedure. Neuro intact and no neurological complaints. Brielle Heading blood per urethra upon deflating parker balloon. Parker flushed per Urology. No recurrent julianna blood. Maintain parker catheter overnight. Urology to see patient in AM. Appreciate Intensivist consult. Right groin site C/D/I. Head CT in AM.  Q1 hr neurochecks.
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
## 2020-12-10 NOTE — PROGRESS NOTES
Admission Medication Reconciliation:    Information obtained from:  Patient  RxQuery data available¹:  YES (for some, other meds filled at the [place])    Comments/Recommendations: Updated PTA meds/reviewed patient's allergies. 1)  I reviewed the patient's PTA medication list with the patient, who seemed to be a good historian. He said he gets most of his medications from the [place], but gets a few filled at [facility] (which can be seen on the Rx Query report). 2)  Medication changes (since last review): Added  - Latanoprost eye drops    Adjusted  - Finasteride was changed to QHS  - Mometasone was changed to QHS    Removed  - Cyclobenzaprine  - Fish oil capsules  - Prednisone    3)  Note: patient takes sertraline as needed; when asked how often he usually takes he he stated that he takes it about once per week. ¹RxQuery pharmacy benefit data reflects medications filled and processed through the patient's insurance, however   this data does NOT capture whether the medication was picked up or is currently being taken by the patient. Allergies:  Melon flavor    Significant PMH/Disease States:   Past Medical History:   Diagnosis Date    Anxiety disorder     Arthritis     Asthma     BPH     Elevated PSA     GERD (gastroesophageal reflux disease)     Hearing reduced     Hypertension     Peyronie disease     Ringing in ear     Snoring      Chief Complaint for this Admission:  No chief complaint on file. Prior to Admission Medications:   Prior to Admission Medications   Prescriptions Last Dose Informant Taking? Cetirizine (ZYRTEC) 10 mg cap 12/10/2020 at Unknown time  Yes   Sig: Take 10 mg by mouth daily. MULTIVITAMINS (MULTIVITAMIN PO) 12/10/2020 at Unknown time  Yes   Sig: Take 1 Tab by mouth daily. albuterol (PROVENTIL HFA, VENTOLIN HFA, PROAIR HFA) 90 mcg/actuation inhaler 11/10/2020 at Unknown time  Yes   Sig: Take 1 Puff by inhalation every four (4) hours as needed for Wheezing.    cholecalciferol (Vitamin D3) 25 mcg (1,000 unit) cap 12/10/2020 at Unknown time  Yes   Sig: Take 1,000 Units by mouth daily. finasteride (PROSCAR) 5 mg tablet 2020 at Unknown time  Yes   Sig: Take 5 mg by mouth nightly. fluticasone propionate (FLONASE) 50 mcg/actuation nasal spray 12/10/2020 at Unknown time  Yes   Si Sprays by Both Nostrils route daily. hydroCHLOROthiazide (HYDRODIURIL) 25 mg tablet 12/10/2020 at Unknown time  Yes   Sig: Take 12.5 mg by mouth daily. latanoprost (XALATAN) 0.005 % ophthalmic solution   Yes   Sig: Administer 1 Drop to left eye nightly. levETIRAcetam (KEPPRA) 500 mg tablet 12/10/2020 at Unknown time  Yes   Sig: Take 500 mg by mouth every twelve (12) hours. mometasone (ASMANEX TWISTHALER) 220 mcg/ actuation (60) inhaler 2020 at Unknown time  Yes   Sig: Take 2 Puffs by inhalation nightly as needed. pantoprazole (PROTONIX) 40 mg tablet 12/10/2020 at Unknown time  Yes   Sig: Take 40 mg by mouth daily. psyllium (METAMUCIL) powd   Yes   Sig: Take 1 Packet by mouth every other day. sertraline (Zoloft) 100 mg tablet Unknown at Unknown time  No   Sig: Take 50 mg by mouth daily as needed. Patient states he takes about once per week   tamsulosin (FLOMAX) 0.4 mg capsule 2020 at Unknown time  Yes   Sig: Take 1 Cap by mouth nightly. Facility-Administered Medications: None       Please contact the main inpatient pharmacy with any questions or concerns at [phone] and we will direct you to the clinical pharmacist covering this patient's care while in-house.    Stefan Bowers, CHASED

## 2020-12-10 NOTE — PROGRESS NOTES
Neurointerventional Surgery Progress Note:    Received page that pt with bleeding from parker when they tried to remove. Will consult intensivist and urology for management. CBC ordered    Discussed with intensivist ALBERT Pak and Dr. Sasha De La Torre.      Reema Holm NP  Neurocritical Care Nurse Practitioner  [phone]

## 2020-12-10 NOTE — PERIOP NOTES
TRANSFER - OUT REPORT:    Verbal report given to LINO Eastman(name) on Flako Bryant  being transferred to ICU 10(unit) for routine post - op       Report consisted of patients Situation, Background, Assessment and   Recommendations(SBAR). Time Pre op antibiotic given:n/a  Anesthesia Stop time: 1100  Zheng Present on Transfer to floor:yes      Information from the following report(s) SBAR, Procedure Summary, Intake/Output and MAR was reviewed with the receiving nurse. Opportunity for questions and clarification was provided. Is the patient on 02? NO          Is the patient on a monitor? YES    Is the nurse transporting with the patient? YES    Surgical Waiting Area notified of patient's transfer from PACU?  YES      The following personal items collected during your admission accompanied patient upon transfer:   Dental Appliance:    Vision:    Hearing Aid:    Jewelry:    Clothin belongings bag    Other Valuables:    Valuables sent to safe:

## 2020-12-10 NOTE — PROGRESS NOTES
1420: TRANSFER - IN REPORT:    Verbal report received from [place], RN(name) on Peter Backer  being received from PACU (unit) for routine progression of care      Report consisted of patients Situation, Background, Assessment and   Recommendations(SBAR). Information from the following report(s) SBAR, Kardex, Intake/Output, Accordion, Recent Results, Cardiac Rhythm NSR and Dual Neuro Assessment was reviewed with the receiving nurse. Opportunity for questions and clarification was provided. Assessment completed upon patients arrival to unit and care assumed. 1630: Attempted to remove Parker placed during procedure. Deflated balloon and received julianna red blood. Balloon re-inflated. Consulted AYDEN Cash NP    1700: [facility] and spoke with Michael Hodge. On call MD being paged to call the unit. ETA for call back 30-45 minutes. 1720: Dr. Brenda Luu called and requested parker to be irrigated. If bleeding continues after 2 hours please call him back.

## 2020-12-10 NOTE — PROGRESS NOTES
Pt ambulated to unit unassisted with daughter.   Procedure by Dr Jon Narvaez, interventional cerebral angiogram

## 2020-12-10 NOTE — BRIEF OP NOTE
NEUROINTERVENTIONAL SURGERY POST-PROCEDURE NOTE    PROCEDURE:  Cerebral angiogram and particle embolization of middle meningeal arteries for SDH treatment    VESSEL(S) STUDIED:  1. Right ICA  2. Right ECA  3. Right MMA  4. Right CCA  5. Left CCA  6. Left ECA  7. Left MMA VESSEL(S) TREATED:  1. Right MMA  2. Left MMA      PRELIMINARY REPORT & DISPOSITION:   Evidence of prior right frontal gianna hole and left frontal craniotomy. No active extrav. Status post particle embolization of the bilateral frontal MMA branches using 150-250 micron PVA in contrast-saline mixture, resulting in stagnation of flow in these vessels. COMPLICATIONS:  None    FOLLOW-UP:  Admit to ICU  Outpatient follow up per Neurosurgery DATE OF SERVICE:  12/10/2020 10:46 AM     ATTENDING SURGEON(S):  MD David Alvarado MD    ANESTHESIA:   GA    MEDICATIONS:   See nursing record    PUNCTURE SITE:  Right common femoral artery. Arteriotomy closed with [employer]. Flat with leg straight x 2 hours. Fransisco Cline M.D.     Abdirizak Cunningham    , Department of Radiology  [facility] [place]

## 2020-12-10 NOTE — INTERVAL H&P NOTE
Update History & Physical    The Patient's History and Physical of November 20, 2020 was reviewed with the patient and I examined the patient. There was no change. The surgical site was confirmed by the patient and me. Plan:    Proceed with cerebral angiogram and middle meningeal artery embolization today with Dr. Patrice Huizar to ICU post procedure for q1 hour neuro checks  - Follow up in NIS clinic planned to review imaging and discuss treatment options     Labs reviewed prior to procedure:  - Creatnine 0.89, Hgb 13.4  - COVID-19 testing reviewed and is negative     Risk, benefits and alternatives of procedure were reviewed prior to this procedure by myself and Dr. ROBBINS [facility] with patient. The patient verbalized understanding and would like to proceed with procedure as planned. All questions were answered, and written informed consent has been obtained.       Electronically signed by Pola Smith NP on 12/10/2020 at 7:43 AM

## 2020-12-10 NOTE — PROGRESS NOTES
Neurocritical Care Brief Progress Note:    Rounded on pt post procedure in PACU. Pt denies and headache or pain at groin site, states he is hungry and wants lunch. Right groin arteriotomy site assessed, is soft and non-tender on palpation, dressing is C/D/I, with no active bleeding or drainage noted. Leg straight x 2 hours post procedure and then may sit up and have lunch (approx 1300). Regular diet ordered.      Chhaya Paul NP  Neurocritical Care Nurse Practitioner  [phone]

## 2020-12-10 NOTE — ANESTHESIA POSTPROCEDURE EVALUATION
* No procedures listed *.    general    <BSHSIANPOST>    INITIAL Post-op Vital signs:   Vitals Value Taken Time   /81 12/10/2020 11:30 AM   Temp 36.6 °C (97.9 °F) 12/10/2020 11:00 AM   Pulse 71 12/10/2020 11:31 AM   Resp 12 12/10/2020 11:31 AM   SpO2 100 % 12/10/2020 11:31 AM   Vitals shown include unvalidated device data.

## 2020-12-11 ENCOUNTER — APPOINTMENT (OUTPATIENT)
Dept: CT IMAGING | Age: 80
DRG: 025 | End: 2020-12-11
Attending: RADIOLOGY
Payer: MEDICARE

## 2020-12-11 ENCOUNTER — APPOINTMENT (OUTPATIENT)
Dept: GENERAL RADIOLOGY | Age: 80
DRG: 025 | End: 2020-12-11
Attending: STUDENT IN AN ORGANIZED HEALTH CARE EDUCATION/TRAINING PROGRAM
Payer: MEDICARE

## 2020-12-11 ENCOUNTER — APPOINTMENT (OUTPATIENT)
Dept: GENERAL RADIOLOGY | Age: 80
DRG: 025 | End: 2020-12-11
Attending: NURSE PRACTITIONER
Payer: MEDICARE

## 2020-12-11 ENCOUNTER — APPOINTMENT (OUTPATIENT)
Dept: CT IMAGING | Age: 80
DRG: 025 | End: 2020-12-11
Attending: NURSE PRACTITIONER
Payer: MEDICARE

## 2020-12-11 LAB
ABO + RH BLD: NORMAL
ANION GAP SERPL CALC-SCNC: 1 MMOL/L (ref 5–15)
APPEARANCE UR: ABNORMAL
ATRIAL RATE: 130 BPM
ATRIAL RATE: 133 BPM
BACTERIA URNS QL MICRO: NEGATIVE /HPF
BILIRUB UR QL: NEGATIVE
BLOOD GROUP ANTIBODIES SERPL: NORMAL
BUN SERPL-MCNC: 11 MG/DL (ref 6–20)
BUN/CREAT SERPL: 12 (ref 12–20)
CALCIUM SERPL-MCNC: 8.2 MG/DL (ref 8.5–10.1)
CALCULATED P AXIS, ECG09: 33 DEGREES
CALCULATED P AXIS, ECG09: 46 DEGREES
CALCULATED R AXIS, ECG10: 22 DEGREES
CALCULATED R AXIS, ECG10: 23 DEGREES
CALCULATED T AXIS, ECG11: 30 DEGREES
CALCULATED T AXIS, ECG11: 34 DEGREES
CHLORIDE SERPL-SCNC: 108 MMOL/L (ref 97–108)
CO2 SERPL-SCNC: 31 MMOL/L (ref 21–32)
COLOR UR: ABNORMAL
COVID-19 RAPID TEST, COVR: NOT DETECTED
CREAT SERPL-MCNC: 0.91 MG/DL (ref 0.7–1.3)
DIAGNOSIS, 93000: NORMAL
DIAGNOSIS, 93000: NORMAL
EPITH CASTS URNS QL MICRO: ABNORMAL /LPF
ERYTHROCYTE [DISTWIDTH] IN BLOOD BY AUTOMATED COUNT: 13.5 % (ref 11.5–14.5)
GLUCOSE SERPL-MCNC: 94 MG/DL (ref 65–100)
GLUCOSE UR STRIP.AUTO-MCNC: NEGATIVE MG/DL
HCT VFR BLD AUTO: 37.1 % (ref 36.6–50.3)
HCT VFR BLD AUTO: 39.6 % (ref 36.6–50.3)
HGB BLD-MCNC: 11.8 G/DL (ref 12.1–17)
HGB BLD-MCNC: 12.8 G/DL (ref 12.1–17)
HGB UR QL STRIP: ABNORMAL
KETONES UR QL STRIP.AUTO: NEGATIVE MG/DL
LACTATE SERPL-SCNC: 1.6 MMOL/L (ref 0.4–2)
LACTATE SERPL-SCNC: 3.6 MMOL/L (ref 0.4–2)
LEUKOCYTE ESTERASE UR QL STRIP.AUTO: ABNORMAL
MCH RBC QN AUTO: 29.2 PG (ref 26–34)
MCHC RBC AUTO-ENTMCNC: 31.8 G/DL (ref 30–36.5)
MCV RBC AUTO: 91.8 FL (ref 80–99)
NITRITE UR QL STRIP.AUTO: NEGATIVE
NRBC # BLD: 0 K/UL (ref 0–0.01)
NRBC BLD-RTO: 0 PER 100 WBC
P-R INTERVAL, ECG05: 176 MS
P-R INTERVAL, ECG05: 178 MS
PH UR STRIP: 8 [PH] (ref 5–8)
PLATELET # BLD AUTO: 215 K/UL (ref 150–400)
PMV BLD AUTO: 10.9 FL (ref 8.9–12.9)
POTASSIUM SERPL-SCNC: 3.5 MMOL/L (ref 3.5–5.1)
PROCALCITONIN SERPL-MCNC: 2.16 NG/ML
PROT UR STRIP-MCNC: 30 MG/DL
Q-T INTERVAL, ECG07: 274 MS
Q-T INTERVAL, ECG07: 276 MS
QRS DURATION, ECG06: 88 MS
QRS DURATION, ECG06: 88 MS
QTC CALCULATION (BEZET), ECG08: 406 MS
QTC CALCULATION (BEZET), ECG08: 407 MS
RBC # BLD AUTO: 4.04 M/UL (ref 4.1–5.7)
RBC #/AREA URNS HPF: >100 /HPF (ref 0–5)
SODIUM SERPL-SCNC: 140 MMOL/L (ref 136–145)
SOURCE, COVRS: NORMAL
SP GR UR REFRACTOMETRY: 1.01 (ref 1–1.03)
SPECIMEN EXP DATE BLD: NORMAL
SPECIMEN SOURCE, FCOV2M: NORMAL
SPECIMEN TYPE, XMCV1T: NORMAL
UA: UC IF INDICATED,UAUC: ABNORMAL
UROBILINOGEN UR QL STRIP.AUTO: 0.2 EU/DL (ref 0.2–1)
VENTRICULAR RATE, ECG03: 130 BPM
VENTRICULAR RATE, ECG03: 133 BPM
WBC # BLD AUTO: 6.3 K/UL (ref 4.1–11.1)
WBC URNS QL MICRO: ABNORMAL /HPF (ref 0–4)

## 2020-12-11 PROCEDURE — 65610000006 HC RM INTENSIVE CARE

## 2020-12-11 PROCEDURE — 87186 SC STD MICRODIL/AGAR DIL: CPT

## 2020-12-11 PROCEDURE — 85018 HEMOGLOBIN: CPT

## 2020-12-11 PROCEDURE — 77030029684 HC NEB SM VOL KT MONA -A

## 2020-12-11 PROCEDURE — 93005 ELECTROCARDIOGRAM TRACING: CPT

## 2020-12-11 PROCEDURE — 81001 URINALYSIS AUTO W/SCOPE: CPT

## 2020-12-11 PROCEDURE — 99233 SBSQ HOSP IP/OBS HIGH 50: CPT | Performed by: RADIOLOGY

## 2020-12-11 PROCEDURE — 74011000250 HC RX REV CODE- 250: Performed by: STUDENT IN AN ORGANIZED HEALTH CARE EDUCATION/TRAINING PROGRAM

## 2020-12-11 PROCEDURE — 74011250636 HC RX REV CODE- 250/636: Performed by: NURSE PRACTITIONER

## 2020-12-11 PROCEDURE — 87040 BLOOD CULTURE FOR BACTERIA: CPT

## 2020-12-11 PROCEDURE — 83605 ASSAY OF LACTIC ACID: CPT

## 2020-12-11 PROCEDURE — 71045 X-RAY EXAM CHEST 1 VIEW: CPT

## 2020-12-11 PROCEDURE — 74011000258 HC RX REV CODE- 258: Performed by: NURSE PRACTITIONER

## 2020-12-11 PROCEDURE — 02HV33Z INSERTION OF INFUSION DEVICE INTO SUPERIOR VENA CAVA, PERCUTANEOUS APPROACH: ICD-10-PCS | Performed by: STUDENT IN AN ORGANIZED HEALTH CARE EDUCATION/TRAINING PROGRAM

## 2020-12-11 PROCEDURE — 86900 BLOOD TYPING SEROLOGIC ABO: CPT

## 2020-12-11 PROCEDURE — 87086 URINE CULTURE/COLONY COUNT: CPT

## 2020-12-11 PROCEDURE — 80048 BASIC METABOLIC PNL TOTAL CA: CPT

## 2020-12-11 PROCEDURE — 94640 AIRWAY INHALATION TREATMENT: CPT

## 2020-12-11 PROCEDURE — 71046 X-RAY EXAM CHEST 2 VIEWS: CPT

## 2020-12-11 PROCEDURE — 74011250637 HC RX REV CODE- 250/637: Performed by: NURSE PRACTITIONER

## 2020-12-11 PROCEDURE — 85027 COMPLETE CBC AUTOMATED: CPT

## 2020-12-11 PROCEDURE — 70450 CT HEAD/BRAIN W/O DYE: CPT

## 2020-12-11 PROCEDURE — 74011000258 HC RX REV CODE- 258: Performed by: STUDENT IN AN ORGANIZED HEALTH CARE EDUCATION/TRAINING PROGRAM

## 2020-12-11 PROCEDURE — 87635 SARS-COV-2 COVID-19 AMP PRB: CPT

## 2020-12-11 PROCEDURE — 74011000250 HC RX REV CODE- 250: Performed by: NURSE PRACTITIONER

## 2020-12-11 PROCEDURE — 74176 CT ABD & PELVIS W/O CONTRAST: CPT

## 2020-12-11 PROCEDURE — 87077 CULTURE AEROBIC IDENTIFY: CPT

## 2020-12-11 PROCEDURE — 84145 PROCALCITONIN (PCT): CPT

## 2020-12-11 PROCEDURE — 36591 DRAW BLOOD OFF VENOUS DEVICE: CPT

## 2020-12-11 PROCEDURE — 03HY32Z INSERTION OF MONITORING DEVICE INTO UPPER ARTERY, PERCUTANEOUS APPROACH: ICD-10-PCS | Performed by: RADIOLOGY

## 2020-12-11 RX ORDER — VANCOMYCIN HYDROCHLORIDE
1250
Status: DISCONTINUED | OUTPATIENT
Start: 2020-12-12 | End: 2020-12-13

## 2020-12-11 RX ORDER — VANCOMYCIN 1.75 GRAM/500 ML IN 0.9 % SODIUM CHLORIDE INTRAVENOUS
1750
Status: COMPLETED | OUTPATIENT
Start: 2020-12-11 | End: 2020-12-11

## 2020-12-11 RX ORDER — ACETAMINOPHEN 325 MG/1
650 TABLET ORAL
Status: DISCONTINUED | OUTPATIENT
Start: 2020-12-11 | End: 2020-12-17 | Stop reason: HOSPADM

## 2020-12-11 RX ORDER — SIMETHICONE 80 MG
80 TABLET,CHEWABLE ORAL
Status: DISCONTINUED | OUTPATIENT
Start: 2020-12-11 | End: 2020-12-14

## 2020-12-11 RX ORDER — NOREPINEPHRINE BITARTRATE/D5W 8 MG/250ML
.5-16 PLASTIC BAG, INJECTION (ML) INTRAVENOUS
Status: DISCONTINUED | OUTPATIENT
Start: 2020-12-11 | End: 2020-12-13

## 2020-12-11 RX ADMIN — DEXTROSE MONOHYDRATE 4 MCG/MIN: 50 INJECTION, SOLUTION INTRAVENOUS at 12:40

## 2020-12-11 RX ADMIN — SODIUM CHLORIDE, POTASSIUM CHLORIDE, SODIUM LACTATE AND CALCIUM CHLORIDE 1000 ML: 600; 310; 30; 20 INJECTION, SOLUTION INTRAVENOUS at 13:30

## 2020-12-11 RX ADMIN — PANTOPRAZOLE SODIUM 40 MG: 40 TABLET, DELAYED RELEASE ORAL at 07:17

## 2020-12-11 RX ADMIN — LEVETIRACETAM 500 MG: 500 TABLET ORAL at 08:15

## 2020-12-11 RX ADMIN — BUDESONIDE 500 MCG: 0.5 INHALANT RESPIRATORY (INHALATION) at 21:17

## 2020-12-11 RX ADMIN — ACETAMINOPHEN 650 MG: 325 TABLET ORAL at 16:29

## 2020-12-11 RX ADMIN — SODIUM CHLORIDE, POTASSIUM CHLORIDE, SODIUM LACTATE AND CALCIUM CHLORIDE 448 ML: 600; 310; 30; 20 INJECTION, SOLUTION INTRAVENOUS at 13:30

## 2020-12-11 RX ADMIN — LEVETIRACETAM 500 MG: 500 TABLET ORAL at 20:00

## 2020-12-11 RX ADMIN — PIPERACILLIN AND TAZOBACTAM 3.38 G: 3; .375 INJECTION, POWDER, LYOPHILIZED, FOR SOLUTION INTRAVENOUS at 19:50

## 2020-12-11 RX ADMIN — FINASTERIDE 5 MG: 5 TABLET, FILM COATED ORAL at 21:23

## 2020-12-11 RX ADMIN — TAMSULOSIN HYDROCHLORIDE 0.4 MG: 0.4 CAPSULE ORAL at 21:23

## 2020-12-11 RX ADMIN — SODIUM CHLORIDE, POTASSIUM CHLORIDE, SODIUM LACTATE AND CALCIUM CHLORIDE 1000 ML: 600; 310; 30; 20 INJECTION, SOLUTION INTRAVENOUS at 11:40

## 2020-12-11 RX ADMIN — VANCOMYCIN HYDROCHLORIDE 1750 MG: 10 INJECTION, POWDER, LYOPHILIZED, FOR SOLUTION INTRAVENOUS at 12:12

## 2020-12-11 RX ADMIN — CETIRIZINE HYDROCHLORIDE 10 MG: 10 TABLET, FILM COATED ORAL at 08:15

## 2020-12-11 RX ADMIN — SIMETHICONE 80 MG: 80 TABLET, CHEWABLE ORAL at 22:35

## 2020-12-11 RX ADMIN — SODIUM CHLORIDE, POTASSIUM CHLORIDE, SODIUM LACTATE AND CALCIUM CHLORIDE 100 ML/HR: 600; 310; 30; 20 INJECTION, SOLUTION INTRAVENOUS at 10:12

## 2020-12-11 RX ADMIN — PIPERACILLIN AND TAZOBACTAM 3.38 G: 3; .375 INJECTION, POWDER, LYOPHILIZED, FOR SOLUTION INTRAVENOUS at 14:19

## 2020-12-11 NOTE — PROCEDURES
[facility]      Procedure Note - Arterial Access:   Performed by Tisha Bah NP. Immediately prior to the procedure, the patient was reevaluated and found suitable for the planned procedure and any planned medications. Immediately prior to the procedure a time out was called to verify the correct patient, procedure, equipment, staff, and marking as appropriate. Central line Bundle:  Full sterile barrier precautions used. 5 mL 1% Lidocaine placed at insertion site. Insertion Date: 12/11/2020 Time:1300   Procedure Location:  ICU. Condition: Emergency. Consent:  NO.     Method: Seldinger technique. Site Prep: ChloraPrep. Procedure: Arterial Catheter Insertion in Left, Radial Artery   Catheter inserted into a new site. Number of Attempts:  2 Indication: Monitoring. There was bright red, pulsatile blood return. Femoral Site? no. If Yes, reason femoral site was chosen:   Catheter secured. Biopatch in place? yes. Sterile Bio-occlusive dressing placed. Complication None. The procedure was tolerated well.     Tisah Bah NP   Critical Care Medicine  [facility]

## 2020-12-11 NOTE — PROCEDURES
[facility]      Procedure Note - Central Venous Access:   Performed by Kemar Burciaga MD    Obtained emergent Consent. Immediately prior to the procedure, the patient was reevaluated and found suitable for the planned procedure and any planned medications. Immediately prior to the procedure a time out was called to verify the correct patient, procedure, equipment, staff, and marking as appropriate. Central line Bundle:  Full sterile barrier precautions used. 7-Step Sterility Protocol followed. (cap, mask sterile gown, sterile gloves, large sterile sheet, hand hygiene, 2% chlorhexidine for cutaneous antisepsis)  5 mL 1% Lidocaine placed at insertion site. Patient positioned in Trendelenburg?yes   The site was prepped with ChloraPrep. Using Seldinger technique a Cordis/Introducer was placed in the Left, Internal Jugular Vein via direct cannulation with 3 number of attempts for central venous access. Initial 2 attempts via RIJ but wire met resistance at 15cm - wire was seen on US looping back on itself on intravascular valve. Left IJ cannulated with ease on first attempt. Ultrasound Guidance was utilized. There was good dark, non-pulsatile blood return in all ports. Femoral Site? no. If Yes, reason femoral site was chosen:   Catheter secured. Biopatch in place? yes. Sterile Bio-occlusive dressing placed. The following complications were encountered: None. A follow-up chest x-ray was ordered post procedure. The procedure was tolerated well.       Kemar Burciaga MD  Critical Care Medicine  [facility]

## 2020-12-11 NOTE — PROGRESS NOTES
Neurocritical Care Brief Progress Note:    Rounded on patient in ICU s/p cerebral angiogram and particle embolization of the middle meningeal arteries for SDH treatment. Patient states he is doing well other than mild burning on urination. Earlier today nurses noted patient bleeding from parker when they began to remove it and deflated the balloon. No neuro deficits or headache. Physical Exam:  Gen: NAD, calm, cooperative  Urinary: Bloody urine noted in collection bag of the Parker catheter  Neuro: A&Ox4. Follows commands. Speech clear. Affect normal. PERRL, 3 mm bilaterally. Blinks to threat. No disconjugate gaze present. EOMI. Face symmetric. Palate symmetric. Tongue midline. Nancy spontaneously. Strength 5/5 in UE and LE BL. Negative drift. Bulk and tone normal. No involuntary movements. Gait deferred. Skin: Warm, dry, color appropriate for ethnicity. Groin arteriotomy site soft and non-tender on palpation, dressing is C/D/I, with no active bleeding or drainage noted. Positive pedal pulses bilaterally. PLAN:   - Q hourly neuro checks  -CT head in am.   -SBP goal . Hydralazine prn.   -Urology consult in am. Parker flushed by nurses per Urology and maintain Parker overnight.   -Outpatient follow up per Neurosurgery.      Zoya Javed NP  Neurocritical Care Nurse Practitioner  [phone]
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
## 2020-12-11 NOTE — PROGRESS NOTES
Neurointerventional Surgery Brief Progress Note:    Rounded on patient in ICU around [address]. Parker draining clear urine awaiting urology recommendations with potential discharge today. Neuro intact. Rounded on patient in ICU around 1100 to assess success with parker removal. Patient found to have rigors, , temp 101.8. Small amount of leukocytes noted in urine, however I do not suspect this is the cause of this. CXR, blood cultures, 1L LR bolus, zosyn and vancomycin ordered. D/w intensivist and Dr. Torie Urrutia, bedside RN. Full note to follow.      Jerry Burdick NP  Neurocritical Care Nurse Practitioner  [phone]

## 2020-12-11 NOTE — PROGRESS NOTES
Pharmacist Note - Vancomycin Dosing    Consult provided for this 68 y.o. male for indication of bacteremia  Antibiotic regimen(s): zosyn+van  Patient on vancomycin PTA? NO     Recent Labs     20  0359 12/10/20  1912   WBC 6.3 8.2   CREA 0.91  --    BUN 11  --      Frequency of BMP: daily  Height: 180.3 cm  Weight: 81.6 kg  Est CrCl: ~ 70-75 ml/min;  r  Temp (24hrs), Av.1 °F (36.7 °C), Min:97.3 °F (36.3 °C), Max:101.2 °F (38.4 °C)    Cultures:  none    Goal trough = 15 - 20 mcg/mL    Therapy will be initiated with a loading dose of 1750 mg IV x 1 to be followed by a maintenance dose of 1250 mg IV every 16 hours. Pharmacy to follow patient daily and order levels / make dose adjustments as appropriate.

## 2020-12-11 NOTE — PROGRESS NOTES
1530: Bedside shift change report given to [address] [place] [address] (oncoming nurse) by Ada Ramos (offgoing nurse). Report included the following information SBAR, Kardex, Intake/Output, MAR, Recent Results, Cardiac Rhythm Sinus tach and Dual Neuro Assessment.     1700. Dr. Leonid Lora and Danisha Holliday NP at bedside. Verbal orders received to continue Q1 neuro checks during the day, Q2 neurochecks ok from [id number] to promote rest.     2000: Reassessment. Increased distention of abdomen. Last BM 12/9, no tenderness. Bowel sounds are active. Voiding clear, pink-tinged urine frequently. Gerardo Lambert NP made aware. 2200: Gerardo Lambert NP at bedside to assess abdomen. Verbal orders received for po simethicone 80mg q4 prn for GI pain. 2250: Pt reports that he is passing flatus and belly feeling better. 2239: Notified by lab that 2/4 blood culture bottles growing gram+ cocci in pairs and chains. Gerardo Lambert NP notified. 0730: Bedside shift change report given to Giovanni (oncoming nurse) by [address] [place] [address] (offgoing nurse). Report included the following information SBAR, Kardex, Intake/Output, MAR, Recent Results, Cardiac Rhythm NSR/sinus tach and Alarm Parameters .

## 2020-12-11 NOTE — CONSULTS
Requesting Provider: Skinny Sanchez MD - Reason for Consultation: \"gross hematuria\"  Pre-existing [facility] Patient:   Yes                Patient: Brady Anthony MRN: [id number]  SSN: [id number]    YOB: 1940  Age: 68 y.o. Sex: male     Location: [address] [place] [address]       Code Status: No Order   PCP: Esther Mascorro MD  - [phone]   Emergency Contact:  Primary Emergency Contact: Kaley Santos, Home Phone: [phone]   Race/Yazidism/Language: [address] / Alexsander Gill / Radha Fern: Payor: Cintia Paul / Plan: Evolita / Product Type: Medicare /    Prior Admission Data: 4/17/20 [facility] ENDOSCOPY Mendel Galeazzi   Hospitalized:  Hospital Day: 2 - Admitted 12/10/2020  6:56 AM   POD # 1 Day Post-Op * No procedures listed * by * No surgeons listed * - Blood Loss: * No values recorded between 12/10/2020 12:00 AM and 12/11/2020  7:35 AM * * Missing case tracking time(s) *     CONSULTANTS  IP CONSULT TO UROLOGY  IP CONSULT TO INTENSIVIST   ADMISSION DIAGNOSES    ICD-10-CM ICD-9-CM   1. [facility]  S06.5X9A 432.1         Assessment/Plan:       · Gross Hematuria, resolved  · Dysuria  · Hx of BPH  · Hx of TURP     - 1 occurrence of hematuria overnight likely r/t traumatic Zheng insertion vs. False passage. Okay to remove Zheng for discharge with Voiding Trial. Re-insert Zheng for PVR >350 cc.   - Urinalysis ordered and reviewed, not suggestive of infection.    - Continue Flomax and Finasteride.  - Recommend outpatient follow-up with Dr. Renny Longoria for hematuria workup. Will arrange f/u. Patient instructed to return to ED with inability to urinate, return of gross hematuria, or fevers/chills.   ====Scheduled 12/28/20 at 1:50 PM at [facility] location with Dr. Renny Longoria. ========    Case discussed with Dr. Tedd Mcardle      CC: No chief complaint on file.    HPI: He is a 68 y.o. male with PMH of hypertension, arthritis, asthma, gastroesophageal reflux disease, and benign prostatic hypertrophy who presents on referral from Dr. Miguel Angel Robertson for evaluation for middle meningeal artery embolization treatment for chronic subdural hematomas. Admitted to the ICU 12/10/20 s/p cerebral angiogram and particle embolization of the middle meningeal arteries for SDH treatment. Urology consulted for gross hematuria upon deflating parker balloon x 1 day ago. He is a known patient to Dr. Nguyen Arcos at [facility], last seen 20, see last OV Note below. Hx significant for BPH and elevated PSA. His prostate biopsy in  was negative. He is on Flomax and finasteride for BPH. Has history of TURP several years ago. Parker remained in place overnight. Manually irrigated x 1. No recurrent julianna blood noted. Likely related to traumatic parker insertion vs false passage. He is not on anticoagulation. He denies Hx of renal, bladder, or prostate CA. He was a former smoker, however, reports he quit > 50 years ago. No hx of kidney stones. He reports dysuria. Denies flank pain or abd pain. No CVA tenderness. Parker in place with clear yellow UA. Dried blood at urinary meatus. AFVSS  WBC 6.3  Hgb 11.8  Cr 0.91  UA pending   + Parker w/ 1060 cc UOP documented  + Flomax, Finasteride     Problem: gross hematuria; Location: urinary meatus;, Severity: mild; Timin day, Context: see above in HPI; Better/Worse: better, Associated s/s: dysuria      =====Last OV Note 20========  A 70year-old gentleman with history of prior biopsy for an elevated PSA, ongoing prostatism. He is on finasteride and Flomax and holding steady. Sexual function not a huge issue for him. He still occasionally will have good relations with his wife but does not want anything super-aggressive done. PSA today is just 2.2. PAST MEDICAL HISTORY:    Allergies: * MELONS (Moderate)  DENIES: Latex, Shellfish, X-Ray Dye, Iodine. Medications: FINASTERIDE 5MG TABS 5MG (FINASTERIDE) TAKE 1 TABLET DAILY;  Route: ORAL  TAMSULOSIN HCL 0.4 MG ORAL CAPSULE (TAMSULOSIN HCL) 1 daily; Route: ORAL  * STENDRA 200MG 1 po q day prn; Route: ORAL  ZYRTEC ALLERGY TABLET (CETIRIZINE HCL TABS) 10 mg 1 as needed; Route: ORAL  MULTIVITAMINS TABS (MULTIPLE VITAMIN) 1 Daily; Route: ORAL  ASMANEX 14 METERED DOSES 220 MCG/INH INHALATION AEROSOL POWDER BREATH ACTIVATED (MOMETASONE FUROATE) 1 as needed; Route: ORAL    Problems: Slow urinary stream (ICD-788.62) (RCD51-G36.198)  Benign prostatic hyperplasia with lower urinary tract symptoms (ICD-600.01) (LYI72-R55.1)  599.71 GROSS HEMATURIA (ICD-599.71) (LFB92-H73.0)  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 DENIES: Heart Disease, Pacemaker/Defibrillator, Diabetes, High Blood Pressure, Bowel Problems, Stroke/Seizure, Kidney Problems, Bleeding Problems, HIV, Hepatitis, or Cancer. Surgeries: Hernia Repair and Cataract Surgery. Family History: DENIES: Prostate cancer, Kidney cancer, Kidney disease, Kidney stones. Social History: Retired. . Smoking status: former smoker. Does not drink alcohol. System Review: Admits to: Dry Eyes and Dry Mouth. DENIES: Unexplained Weight Loss, Leg Swelling, Shortness of Breath, Constipation, Involuntary Urine Loss, Lower Extremity Weakness, Dry Skin, Difficulty Walking, Psychiatric Problems, Impaired Sex Drive, Easy Bleeding, Rash. EXAMINATION: Vitals: Pulse: 88 BP: 169/82 Weight: 180 lbs Height: 5' 9\" BMI: 26.68 kg/m^2  The patient is a well-developed, well-nourished male in no acute distress, alert and oriented x3, pleasant and afebrile. The flanks are nontender without mass. The abdomen is soft, nontender and benign. Liver and spleen are without organomegaly. Hemoccult not indicated.  Examination of the groins reveal no adenopathy or hernias. The scrotum is normal without lesions. The testes and epididymides are nontender, without masses. The urethral meatus is normally situated without lesions or discharge. The penis is normal without lesions. Inspection of the anus and perineum is normal without lesions. Prostate is benign feeling, 30 g in size and quite smooth. Seminal vesicles are normal. Anal sphincter tone is normal. There are no rectal masses. Extremities reveal no significant peripheral edema and neurologic is grossly intact. URINALYSIS from Voided specimen  Urine Dip: pH: 6.0, Bld: Neg, LE: Neg, Nit: Neg, Prot: Neg, Ket: Neg, Gluc: Neg  Urine Micro not done    PSA HISTORY  2.38 ng/ml on 2019  2.15 ng/ml on 2018  2.74 ng/ml on 2017    IMPRESSION:    1. ED.    2. Prostatism. 3. Negative biopsies. PLAN: We will see him back in a years time. Continue his finasteride and tamsulosin. Happy to refill his Dariana Parsley if he wants it. Temp (24hrs), Av.6 °F (36.4 °C), Min:97.3 °F (36.3 °C), Max:97.9 °F (36.6 °C)    Urinary Status: Zheng, Draining  Creatinine   Date/Time Value Ref Range Status   2020 03:59 AM 0.91 0.70 - 1.30 MG/DL Final   2020 12:04 PM 0.89 0.76 - 1.27 mg/dL Final   2019 08:26 AM 0.89 0.76 - 1.27 mg/dL Final   2019 09:40 AM 0.95 0.76 - 1.27 mg/dL Final   2018 09:03 AM 0.80 0.76 - 1.27 mg/dL Final     Current Antimicrobial Therapy (168h ago, onward)    None        Key Anti-Platelet Anticoagulant Meds     The patient is on no antiplatelet meds or anticoagulants.         Diet: DIET REGULAR -       Labs     Lab Results   Component Value Date/Time    Lactic acid 1.3 2010 07:40 PM    WBC 6.3 2020 03:59 AM    HCT 37.1 2020 03:59 AM    PLATELET 822  03:59 AM    Sodium 140 2020 03:59 AM    Potassium 3.5 2020 03:59 AM    Chloride 108 2020 03:59 AM    CO2 31 2020 03:59 AM    BUN 11 12/11/2020 03:59 AM    Creatinine 0.91 12/11/2020 03:59 AM    Glucose 94 12/11/2020 03:59 AM    Calcium 8.2 (L) 12/11/2020 03:59 AM    Magnesium 2.2 10/24/2015 09:05 PM     UA:   Lab Results   Component Value Date/Time    Color YELLOW/STRAW 10/24/2015 09:41 PM    Appearance CLEAR 10/24/2015 09:41 PM    Specific gravity 1.007 10/24/2015 09:41 PM    pH (UA) 6.0 10/24/2015 09:41 PM    Protein NEGATIVE  10/24/2015 09:41 PM    Glucose NEGATIVE  10/24/2015 09:41 PM    Ketone NEGATIVE  10/24/2015 09:41 PM    Bilirubin NEGATIVE  10/24/2015 09:41 PM    Urobilinogen 1.0 10/24/2015 09:41 PM    Nitrites NEGATIVE  10/24/2015 09:41 PM    Leukocyte Esterase NEGATIVE  10/24/2015 09:41 PM    Epithelial cells FEW 10/24/2015 09:41 PM    Bacteria NEGATIVE  10/24/2015 09:41 PM    WBC 0-4 10/24/2015 09:41 PM    RBC 0-5 10/24/2015 09:41 PM     Imaging     Results for orders placed during the hospital encounter of 11/10/20   CT HEAD WO CONT    Narrative EXAM: CT HEAD WO CONT    INDICATION: Subdural hematoma    COMPARISON: 1/30/2010. CONTRAST: None. TECHNIQUE: Unenhanced CT of the head was performed using 5 mm images. Brain and  bone windows were generated. Coronal and sagittal reformats. CT dose reduction  was achieved through use of a standardized protocol tailored for this  examination and automatic exposure control for dose modulation. FINDINGS:  The ventricles and sulci are normal in size, shape and configuration. . There is  no significant white matter disease. Bilateral frontoparietal subdural  hygromas/chronic subdural hematomas are noted, progressive compared to the prior  exam. The basilar cisterns are open. No CT evidence of acute infarct. The bone windows demonstrate left frontal craniotomy and right frontal gianna  hole. Near-complete opacification of the frontal sinuses is noted along with  opacification of scattered ethmoid air cells bilaterally. Vascular calcification  is noted.       Impression IMPRESSION: Bilateral frontoparietal subdural hygromas/chronic subdural hematomas. No acute  subdural hematoma is noted. US Results (most recent):  No results found for this or any previous visit. Cultures     All Micro Results     None           Past History: (Complete 2+/3 areas)     Allergies   Allergen Reactions    Melon Flavor Swelling     Facial swelling with watermelon and cantaloupe      Current Facility-Administered Medications   Medication Dose Route Frequency    albuterol-ipratropium (DUO-NEB) 2.5 MG-0.5 MG/3 ML  3 mL Nebulization Q6H PRN    cetirizine (ZYRTEC) tablet 10 mg  10 mg Oral DAILY    fluticasone propionate (FLONASE) 50 mcg/actuation nasal spray 2 Spray  2 Spray Both Nostrils DAILY    levETIRAcetam (KEPPRA) tablet 500 mg  500 mg Oral Q12H    tamsulosin (FLOMAX) capsule 0.4 mg  0.4 mg Oral QHS    lactated Ringers infusion  100 mL/hr IntraVENous CONTINUOUS    acetaminophen (TYLENOL) tablet 650 mg  650 mg Oral Q6H PRN    ondansetron (ZOFRAN) injection 4 mg  4 mg IntraVENous Q4H PRN    hydrALAZINE (APRESOLINE) 20 mg/mL injection 10 mg  10 mg IntraVENous Q6H PRN    finasteride (PROSCAR) tablet 5 mg  5 mg Oral QHS    budesonide (PULMICORT) 500 mcg/2 ml nebulizer suspension  500 mcg Nebulization QHS    pantoprazole (PROTONIX) tablet 40 mg  40 mg Oral ACB    Prior to Admission medications    Medication Sig Start Date End Date Taking? Authorizing Provider   latanoprost (XALATAN) 0.005 % ophthalmic solution Administer 1 Drop to left eye nightly. Yes Provider, Historical   levETIRAcetam (KEPPRA) 500 mg tablet Take 500 mg by mouth every twelve (12) hours. 10/25/20  Yes Provider, Historical   pantoprazole (PROTONIX) 40 mg tablet Take 40 mg by mouth daily. Yes Provider, Historical   hydroCHLOROthiazide (HYDRODIURIL) 25 mg tablet Take 12.5 mg by mouth daily. Yes Provider, Historical   cholecalciferol (Vitamin D3) 25 mcg (1,000 unit) cap Take 1,000 Units by mouth daily.    Yes Provider, Historical   fluticasone propionate (FLONASE) 50 mcg/actuation nasal spray 2 Sprays by Both Nostrils route daily. Yes Provider, Historical   mometasone (ASMANEX TWISTHALER) 220 mcg/ actuation (60) inhaler Take 2 Puffs by inhalation nightly as needed. Yes Provider, Historical   tamsulosin (FLOMAX) 0.4 mg capsule Take 1 Cap by mouth nightly. 4/1/19  Yes David Mcmullen NP   albuterol (PROVENTIL HFA, VENTOLIN HFA, PROAIR HFA) 90 mcg/actuation inhaler Take 1 Puff by inhalation every four (4) hours as needed for Wheezing. 11/30/17  Yes Magalis Hearn MD   finasteride (PROSCAR) 5 mg tablet Take 5 mg by mouth nightly. Yes Provider, Historical   psyllium (METAMUCIL) powd Take 1 Packet by mouth every other day. Yes Provider, Historical   Cetirizine (ZYRTEC) 10 mg cap Take 10 mg by mouth daily. Yes Provider, Historical   MULTIVITAMINS (MULTIVITAMIN PO) Take 1 Tab by mouth daily. 7/22/10  Yes Provider, Historical   sertraline (Zoloft) 100 mg tablet Take 50 mg by mouth daily as needed. Patient states he takes about once per week    Provider, Historical        PMHx:  has a past medical history of Anxiety disorder, Arthritis, Asthma, BPH, Elevated PSA, GERD (gastroesophageal reflux disease), Hearing reduced, Hypertension, Peyronie disease, Ringing in ear, and Snoring. PSurgHx:  has a past surgical history that includes pr abdomen surgery proc unlisted; hx cyst removal; hx gi; hx heent; and colonoscopy (N/A, 4/17/2020). PSocHx:  reports that he quit smoking about 50 years ago. He has never used smokeless tobacco. He reports previous alcohol use. He reports that he does not use drugs.    ROS:  (Complete - 10 systems) - DENIES: Weightloss (Constitutional), Dry mouth (ENMT), Chest pain (CV), SOB (Respiratory), Constipation (GI), Weakness (MS), Pallor (Skin), TIA Sx (Neuro), Confusion (Psych), Easy bruising (Heme)    Physical Exam: (Comprehesive - 8+ 1995 Systems)     (1) Constitutional:  FIO2:   on SpO2: O2 Sat (%): 97 %  O2 Device: Room air O2 Flow Rate (L/min): 2 l/min  Patient Vitals for the past 24 hrs:   BP Temp Pulse Resp SpO2 Weight   12/11/20 0700 (!) 150/73 -- 69 12 97 % 81.6 kg (179 lb 14.3 oz)   12/11/20 0600 133/81 -- 69 11 97 % --   12/11/20 0500 (!) 146/75 -- 68 13 94 % --   12/11/20 0400 139/74 97.4 °F (36.3 °C) 62 11 98 % --   12/11/20 0300 117/76 -- 71 12 97 % --   12/11/20 0200 (!) 160/80 -- 70 13 95 % --   12/11/20 0100 (!) 140/60 -- 65 12 97 % --   12/11/20 0000 135/71 97.7 °F (36.5 °C) 70 12 95 % --   12/10/20 2300 130/78 -- 71 11 94 % --   12/10/20 2200 139/76 -- 68 14 95 % --   12/10/20 2107 -- -- -- -- 97 % --   12/10/20 2100 138/76 -- 74 16 97 % --   12/10/20 2000 (!) 138/50 97.6 °F (36.4 °C) 78 21 96 % --   12/10/20 1900 (!) 140/64 -- 89 17 97 % --   12/10/20 1830 134/81 -- 93 20 94 % --   12/10/20 1800 (!) 157/88 -- 75 18 98 % --   12/10/20 1730 (!) 146/83 -- 78 18 97 % --   12/10/20 1700 (!) 143/100 -- 87 16 100 % --   12/10/20 1630 (!) 138/102 -- 80 15 99 % --   12/10/20 1600 (!) 152/83 97.6 °F (36.4 °C) 81 17 97 % --   12/10/20 1530 124/76 -- 85 16 98 % --   12/10/20 1500 (!) 148/89 -- 73 17 100 % --   12/10/20 1430 134/79 97.3 °F (36.3 °C) 74 13 100 % --   12/10/20 1330 (!) 140/83 -- 76 14 97 % --   12/10/20 1315 (!) 140/80 -- 75 14 96 % --   12/10/20 1300 127/75 -- 85 13 92 % --   12/10/20 1257 -- 97.6 °F (36.4 °C) -- -- -- --   12/10/20 1245 130/72 -- 72 12 95 % --   12/10/20 1230 (!) 142/84 -- 74 14 96 % --   12/10/20 1215 (!) 143/84 -- 78 13 94 % --   12/10/20 1200 (!) 145/85 -- 76 13 97 % --   12/10/20 1130 (!) 147/81 -- 68 11 100 % --   12/10/20 1115 (!) 141/82 -- 72 12 100 % --   12/10/20 1110 (!) 142/79 -- 70 10 100 % --   12/10/20 1100 134/79 97.9 °F (36.6 °C) 78 14 98 % --   12/10/20 1058 134/79 -- 70 14 -- --       Date 12/10/20 0700 - 12/11/20 0659 12/11/20 0700 - 12/12/20 0659   Shift 4924-7964 1383-1847 24 Hour Total 4861-9101 6510-4305 24 Hour Total   INTAKE   I.V. 986.7 1300 2286. 7        I.V. 150  150        Volume (lactated Ringers infusion) 700  700        Volume (lactated Ringers infusion) 136.7 1300 1436.7      Shift Total(mL/kg) 986.7 1300 2286.7      OUTPUT   Urine         Urine Output (mL) (Urinary Catheter 12/10/20 Zheng)       Shift Total(mL/kg)       .7 740 1226.7      Weight (kg)    81.6 81.6 81.6      (2) ENMT:   moist mucous membranes, normal sinuses   (3) Respiratory:  breathing easily, no distress   (4) GI:  no abdominal masses, tenderness   (5) :   + Zheng with clear UA   (6) Lymphatic:  no adenopathy, neck supple   (7) Muscloskeletal:  no gross deformity, normal ROM   (8) Skin:  no rash, warm & dry   (9) Neuro:  no focal deficits, normal speech      Signed By: Antonio Thakur NP  - December 11, 2020

## 2020-12-11 NOTE — PROGRESS NOTES
Bedside, Verbal and Written shift change report given to Diamante Mas RN (oncoming nurse) by Elgin Mendoza RN (offgoing nurse). Report included the following information SBAR, Kardex, ED Summary, Procedure Summary, Intake/Output, MAR, Accordion, Recent Results, Med Rec Status, Cardiac Rhythm NSR, Alarm Parameters  and Dual Neuro Assessment. Shift Summary: No events overnight. Patient remains Q1 hour neuro checks. Neuro intact. Zheng yellow and clear. Bedside, Verbal and Written shift change report given to Elgin Mendoza RN (oncoming nurse) by Diamante Mas RN (offgoing nurse). Report included the following information SBAR, Kardex, ED Summary, OR Summary, Procedure Summary, Intake/Output, MAR, Accordion, Recent Results, Med Rec Status, Cardiac Rhythm NSR, Alarm Parameters  and Dual Neuro Assessment.

## 2020-12-11 NOTE — PROGRESS NOTES
0730: Bedside, Verbal and Written shift change report given to CHRISTIANNE Goel RN (oncoming nurse) by Zaki Hill RN (offgoing nurse). Report included the following information SBAR, Kardex, Intake/Output, MAR, Cardiac Rhythm NSR and Dual Neuro Assessment. 0830: Urology NP at bedside. Urine robles with no evidence of blood. UA sent as requested and parker removed. Post removal Erwin Reining Blood re-started. Urology NP notified and back at beside to dwayne.       1000: Pt states he has the \"chills\" temp taken, normal. Additional blanket provided. 1100: Neruo NP notifed RN that patient is possibly septic.     1105: At patient bedside and  Pt displaying elevated H.R. in 140's. Temp 101.2. Pt is trimerous with complaints of chills. BP decreased. No complaint of localized pain. Lung sounds clear in all field, Abd non-tender w/o pulsating masses noted. Groin site intact with no noted hematoma or bleeding. Area soft and non tender. Dr Vasiliy Vázquez notified. 1115: Large bore access obtained and fluid bolus started. MD and NP at bedside to establish art-line and central line. 1140: Spoke with pt's daughter. Updates provided. Daughter inbound to see the patient. 1330: Abd ct ordered. Contacted CT and requested transport assistance. 1350: Pt transported to CT with out incident. 1420: Pt back in room with out incident. 1530: Bedside, Verbal and Written shift change report given to Annemarie Jay RN (oncoming nurse) by Terri Davenport. LINO Goel (offgoing nurse). Report included the following information SBAR, Kardex, Intake/Output, MAR, Cardiac Rhythm S. Tach and Dual Neuro Assessment.
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
## 2020-12-12 LAB
ANION GAP SERPL CALC-SCNC: 7 MMOL/L (ref 5–15)
BUN SERPL-MCNC: 16 MG/DL (ref 6–20)
BUN/CREAT SERPL: 15 (ref 12–20)
CALCIUM SERPL-MCNC: 8.5 MG/DL (ref 8.5–10.1)
CHLORIDE SERPL-SCNC: 108 MMOL/L (ref 97–108)
CO2 SERPL-SCNC: 26 MMOL/L (ref 21–32)
CREAT SERPL-MCNC: 1.04 MG/DL (ref 0.7–1.3)
ERYTHROCYTE [DISTWIDTH] IN BLOOD BY AUTOMATED COUNT: 13.9 % (ref 11.5–14.5)
GLUCOSE SERPL-MCNC: 99 MG/DL (ref 65–100)
HCT VFR BLD AUTO: 35.5 % (ref 36.6–50.3)
HGB BLD-MCNC: 11.5 G/DL (ref 12.1–17)
LACTATE SERPL-SCNC: 1.2 MMOL/L (ref 0.4–2)
MAGNESIUM SERPL-MCNC: 1.7 MG/DL (ref 1.6–2.4)
MCH RBC QN AUTO: 29.5 PG (ref 26–34)
MCHC RBC AUTO-ENTMCNC: 32.4 G/DL (ref 30–36.5)
MCV RBC AUTO: 91 FL (ref 80–99)
NRBC # BLD: 0 K/UL (ref 0–0.01)
NRBC BLD-RTO: 0 PER 100 WBC
PHOSPHATE SERPL-MCNC: 3.7 MG/DL (ref 2.6–4.7)
PLATELET # BLD AUTO: 152 K/UL (ref 150–400)
PMV BLD AUTO: 10.9 FL (ref 8.9–12.9)
POTASSIUM SERPL-SCNC: 3.7 MMOL/L (ref 3.5–5.1)
RBC # BLD AUTO: 3.9 M/UL (ref 4.1–5.7)
SODIUM SERPL-SCNC: 141 MMOL/L (ref 136–145)
WBC # BLD AUTO: 22.3 K/UL (ref 4.1–11.1)

## 2020-12-12 PROCEDURE — 74011250636 HC RX REV CODE- 250/636: Performed by: NURSE PRACTITIONER

## 2020-12-12 PROCEDURE — 83605 ASSAY OF LACTIC ACID: CPT

## 2020-12-12 PROCEDURE — 97116 GAIT TRAINING THERAPY: CPT | Performed by: PHYSICAL THERAPIST

## 2020-12-12 PROCEDURE — 36415 COLL VENOUS BLD VENIPUNCTURE: CPT

## 2020-12-12 PROCEDURE — 51798 US URINE CAPACITY MEASURE: CPT

## 2020-12-12 PROCEDURE — 74011250637 HC RX REV CODE- 250/637: Performed by: INTERNAL MEDICINE

## 2020-12-12 PROCEDURE — 94640 AIRWAY INHALATION TREATMENT: CPT

## 2020-12-12 PROCEDURE — 83735 ASSAY OF MAGNESIUM: CPT

## 2020-12-12 PROCEDURE — 74011000250 HC RX REV CODE- 250: Performed by: INTERNAL MEDICINE

## 2020-12-12 PROCEDURE — 77010033678 HC OXYGEN DAILY

## 2020-12-12 PROCEDURE — 74011250637 HC RX REV CODE- 250/637: Performed by: NURSE PRACTITIONER

## 2020-12-12 PROCEDURE — 85027 COMPLETE CBC AUTOMATED: CPT

## 2020-12-12 PROCEDURE — 84100 ASSAY OF PHOSPHORUS: CPT

## 2020-12-12 PROCEDURE — 74011000258 HC RX REV CODE- 258: Performed by: NURSE PRACTITIONER

## 2020-12-12 PROCEDURE — 97161 PT EVAL LOW COMPLEX 20 MIN: CPT | Performed by: PHYSICAL THERAPIST

## 2020-12-12 PROCEDURE — 65610000006 HC RM INTENSIVE CARE

## 2020-12-12 PROCEDURE — 74011000250 HC RX REV CODE- 250: Performed by: NURSE PRACTITIONER

## 2020-12-12 PROCEDURE — 80048 BASIC METABOLIC PNL TOTAL CA: CPT

## 2020-12-12 RX ORDER — BACITRACIN 500 UNIT/G
1 PACKET (EA) TOPICAL ONCE
Status: COMPLETED | OUTPATIENT
Start: 2020-12-12 | End: 2020-12-12

## 2020-12-12 RX ORDER — FACIAL-BODY WIPES
10 EACH TOPICAL DAILY PRN
Status: DISCONTINUED | OUTPATIENT
Start: 2020-12-12 | End: 2020-12-17 | Stop reason: HOSPADM

## 2020-12-12 RX ADMIN — LEVETIRACETAM 500 MG: 500 TABLET ORAL at 20:21

## 2020-12-12 RX ADMIN — TAMSULOSIN HYDROCHLORIDE 0.4 MG: 0.4 CAPSULE ORAL at 22:49

## 2020-12-12 RX ADMIN — BISACODYL 10 MG: 10 SUPPOSITORY RECTAL at 10:41

## 2020-12-12 RX ADMIN — BACITRACIN 1 PACKET: 500 OINTMENT TOPICAL at 09:39

## 2020-12-12 RX ADMIN — LEVETIRACETAM 500 MG: 500 TABLET ORAL at 08:38

## 2020-12-12 RX ADMIN — ACETAMINOPHEN 650 MG: 325 TABLET ORAL at 23:52

## 2020-12-12 RX ADMIN — VANCOMYCIN HYDROCHLORIDE 1250 MG: 10 INJECTION, POWDER, LYOPHILIZED, FOR SOLUTION INTRAVENOUS at 20:21

## 2020-12-12 RX ADMIN — PIPERACILLIN AND TAZOBACTAM 3.38 G: 3; .375 INJECTION, POWDER, LYOPHILIZED, FOR SOLUTION INTRAVENOUS at 11:37

## 2020-12-12 RX ADMIN — VANCOMYCIN HYDROCHLORIDE 1250 MG: 10 INJECTION, POWDER, LYOPHILIZED, FOR SOLUTION INTRAVENOUS at 04:04

## 2020-12-12 RX ADMIN — FINASTERIDE 5 MG: 5 TABLET, FILM COATED ORAL at 22:54

## 2020-12-12 RX ADMIN — PANTOPRAZOLE SODIUM 40 MG: 40 TABLET, DELAYED RELEASE ORAL at 06:34

## 2020-12-12 RX ADMIN — CETIRIZINE HYDROCHLORIDE 10 MG: 10 TABLET, FILM COATED ORAL at 08:38

## 2020-12-12 RX ADMIN — ACETAMINOPHEN 650 MG: 325 TABLET ORAL at 12:41

## 2020-12-12 RX ADMIN — ACETAMINOPHEN 650 MG: 325 TABLET ORAL at 16:18

## 2020-12-12 RX ADMIN — BUDESONIDE 500 MCG: 0.5 INHALANT RESPIRATORY (INHALATION) at 19:54

## 2020-12-12 RX ADMIN — ACETAMINOPHEN 650 MG: 325 TABLET ORAL at 04:23

## 2020-12-12 RX ADMIN — PIPERACILLIN AND TAZOBACTAM 3.38 G: 3; .375 INJECTION, POWDER, LYOPHILIZED, FOR SOLUTION INTRAVENOUS at 20:21

## 2020-12-12 RX ADMIN — PIPERACILLIN AND TAZOBACTAM 3.38 G: 3; .375 INJECTION, POWDER, LYOPHILIZED, FOR SOLUTION INTRAVENOUS at 04:04

## 2020-12-12 RX ADMIN — SIMETHICONE 80 MG: 80 TABLET, CHEWABLE ORAL at 08:38

## 2020-12-12 RX ADMIN — SODIUM CHLORIDE, POTASSIUM CHLORIDE, SODIUM LACTATE AND CALCIUM CHLORIDE 100 ML/HR: 600; 310; 30; 20 INJECTION, SOLUTION INTRAVENOUS at 01:59

## 2020-12-12 NOTE — PROGRESS NOTES
Bedside shift change report given to Baldev Morris RN (oncoming nurse) by Kelsie Moon RN (offgoing nurse). Report included the following information SBAR, Kardex, ED Summary, Procedure Summary, Intake/Output, MAR, Recent Results, Med Rec Status, Cardiac Rhythm NSR, Alarm Parameters , Quality Measures and Dual Neuro Assessment. 1042: Pt voided 140ml, bladder scan post void showed 341ml, pt states he does not feel like he needs to void, Dr Tressa Allen notified. Bedside shift change report given to Radha Humphreys RN (oncoming nurse) by Kelsie Moon RN (offgoing nurse). Report included the following information SBAR, Kardex, ED Summary, Procedure Summary, Intake/Output, MAR, Recent Results, Med Rec Status, Cardiac Rhythm NSR, Alarm Parameters , Quality Measures and Dual Neuro Assessment.
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
## 2020-12-12 NOTE — PROGRESS NOTES
JOEY Plan:  · Anticipated discharge: TBD   · Disposition pending medical progress and care recommendations  · RUR: 11%- LOW  · Transport: DaughterDolly Courser (ph#: [phone])- Private Car    Reason for Admission: Subdural Hematoma                     RUR Score: 11%- LOW                   Plan for utilizing home health: TBD         PCP: First and Last name: Donavon Mccormack MD   Name of Practice: [facility] (ph#: [phone])   Are you a current patient: Yes/No: Yes   Approximate date of last visit: Septemebr- October 2020   Can you participate in a virtual visit with your PCP: Faith Gonzales            Transition of Care Plan: CM contacted pt's spouse, Robbie Moody (ph#: [phone]) and spoke with spouse and daughter, Jesus Carey, to discuss CM role and to assess pt needs. CM verified demographics including insurance and emergency contact information. Pt lives with spouse in a private, one-level home; there are 3 steps to enter home. Transportation: Daughter, Jesus Carey, reports she and her spouse will provide transportation home after discharge. Daughter reports no DME use and mostly IADLs prior to admission. Pt uses [facility] on [address] and daughter reports no concerns with getting medications. PCP verified. Daughter reports no concerns for discharge at this time. CM to follow and assist with disposition needs as they arise. Care Management Interventions  PCP Verified by CM: Yes  Palliative Care Criteria Met (RRAT>21 & CHF Dx)?: No  Mode of Transport at Discharge:  Other (see comment)(Daughter- Private Car)  Transition of Care Consult (CM Consult): Discharge Planning(Initial Assessment)  MyChart Signup: No  Discharge Durable Medical Equipment: No  Physical Therapy Consult: No  Occupational Therapy Consult: No  Speech Therapy Consult: No  Current Support Network: Lives with Spouse, Own Home  Confirm Follow Up Transport: Family  Discharge Location  Discharge Placement: Unable to determine at this time(Disposition TBD/subject to change pending care recommendations)    Gabe Street RN, BSN  Care Management Department

## 2020-12-12 NOTE — PROGRESS NOTES
Problem: Mobility Impaired (Adult and Pediatric)  Goal: *Acute Goals and Plan of Care (Insert Text)  Description: FUNCTIONAL STATUS PRIOR TO ADMISSION: Patient was independent and active without use of DME.    HOME SUPPORT PRIOR TO ADMISSION: The patient lived with wife but did not require assist.    Physical Therapy Goals  Initiated 12/12/2020  1. Patient will move from supine to sit and sit to supine  in bed with modified independence within 7 day(s). 2.  Patient will transfer from bed to chair and chair to bed with modified independence using the least restrictive device within 7 day(s). 3.  Patient will perform sit to stand with modified independence within 7 day(s). 4.  Patient will ambulate with modified independence for 250 feet with the least restrictive device within 7 day(s). 5.  Patient will ascend/descend 4 stairs with 1 handrail(s) with modified independence within 7 day(s). Outcome: Progressing Towards Goal   PHYSICAL THERAPY EVALUATION  Patient: Maciel Bhatti (9 y.o. male)  Date: 12/12/2020  Primary Diagnosis: Subdural hematoma (HCC) [S06.5X9A]  Subdural hematoma (HCC) [S06.5X9A]    2 Days Post-Op   Precautions:   Fall    ASSESSMENT  Based on the objective data described below, the patient presents with decreased functional mobility from baseline level of function. Patient today limited by elevated HR, gait instability, impaired balance, and decreased activity tolerance. Overall needing CGA for sit to stand from chair with cues for technique. Amb approx 20 feet with El and HHA. Anticipate that as patient's medical condition improves his mobility will improve. Currently recommend [facility] PT follow up with family support. Will continue to follow for mobility progression. Other factors to consider for discharge: at risk for falls, below his functional baseline     Patient will benefit from skilled therapy intervention to address the above noted impairments.        PLAN :  Recommendations and Planned Interventions: bed mobility training, transfer training, gait training, therapeutic exercises, patient and family training/education, and therapeutic activities      Frequency/Duration: Patient will be followed by physical therapy:  5 times a week to address goals. Recommendation for discharge: (in order for the patient to meet his/her long term goals)  Physical therapy at least 2 days/week in the home AND ensure assist and/or supervision for safety with mobility        IF patient discharges home will need the following DME: to be determined (TBD) but likely none         SUBJECTIVE:   Patient stated I'm very cold.     OBJECTIVE DATA SUMMARY:   HISTORY:    Past Medical History:   Diagnosis Date    Anxiety disorder     Arthritis     Asthma     BPH     Elevated PSA     GERD (gastroesophageal reflux disease)     Hearing reduced     Hypertension     Peyronie disease     Ringing in ear     Snoring      Past Surgical History:   Procedure Laterality Date    ABDOMEN SURGERY PROC UNLISTED      R inguinal hernia repair    COLONOSCOPY N/A 4/17/2020    COLONOSCOPY performed by Belle Schaffer MD at [facility] ENDOSCOPY    HX CYST REMOVAL      cyst removal back    HX GI      colon polyp 1998; colonoscopy - needs every 5 yrs - 3/09 (-); repeat 3/2014 - Dr. Lezama Morning HEENT      L ear surgery - tympanomastoidectomy (revision 1st surgery 1973 for \"hole in TM\"; s\p choleostoma; b/l cataract surgery - followed at [facility] 8 factors and/or comorbidities impacting plan of care:     Home Situation  Home Environment: Private residence  One/Two Story Residence: One story  Living Alone: No  Support Systems: Family member(s)  Patient Expects to be Discharged to[de-identified] Private residence  Current DME Used/Available at Home: None    EXAMINATION/PRESENTATION/DECISION MAKING:   Critical Behavior:  Neurologic State: Alert, Appropriate for age, Eyes open spontaneously  Orientation Level: Oriented X4  Cognition: Appropriate decision making, Appropriate for age attention/concentration, Appropriate safety awareness, Follows commands    Range Of Motion:  AROM: Generally decreased, functional                       Strength:    Strength: Generally decreased, functional          Functional Mobility:  Bed Mobility:   Not tested           Transfers:  Sit to Stand: Contact guard assistance  Stand to Sit: Contact guard assistance                       Balance:   Sitting: Intact  Standing: Impaired  Standing - Static: Good  Standing - Dynamic : Fair  Ambulation/Gait Training:  Distance (ft): 20 Feet (ft)  Assistive Device: Gait belt(hand heldA)  Ambulation - Level of Assistance: Contact guard assistance;Assist x1     Gait Description (WDL): Exceptions to WDL  Gait Abnormalities: Decreased step clearance;Shuffling gait        Base of Support: Narrowed     Speed/Lauren: Pace decreased (<100 feet/min); Shuffled; Slow  Step Length: Left shortened;Right shortened             Pain Rating:  No c/o pain    Activity Tolerance:   Good    After treatment patient left in no apparent distress:   Sitting in chair and Call bell within reach    COMMUNICATION/EDUCATION:   The patients plan of care was discussed with: Physical therapist, Occupational therapist, and Registered nurse. Fall prevention education was provided and the patient/caregiver indicated understanding., Patient/family have participated as able in goal setting and plan of care. , and Patient/family agree to work toward stated goals and plan of care.     Thank you for this referral.  Debbie Quiles PT, DPT   Time Calculation: 13 mins

## 2020-12-12 NOTE — PROGRESS NOTES
Neurocritical Care Brief Progress Note:    Rounded on patient in ICU s/p cerebral angiogram and particle embolization of the middle meningeal arteries for SDH treatment on 12/10/20. Patient states he is doing well other than distended abdomen. States that they have not let him get out of bed and he needs to have a BM. Earlier today patient spiked a fever, became hypotensive and had tachycardia. He had a CT scan of his abdomen and pelvis which showed no RP bleed or any other acute findings. UA was completed that showed blood, small leukocyte esterase, 0-4 WBC. He was given IVF bolus, ABX and rapid COVID which ws negative. Intensivist following. Patient remains neuro intact. Physical Exam:  Gen: NAD, calm, cooperative  ABD: Distended, firm, positive bowel sounds. Neuro: A&Ox4. Follows commands. Speech clear. Affect normal. PERRL, 3 mm bilaterally. Blinks to threat. No disconjugate gaze present. EOMI. Face symmetric. Palate symmetric. Tongue midline. Nancy spontaneously. Strength 5/5 in UE and LE BL. Negative drift. Bulk and tone normal. No involuntary movements. Gait deferred. Skin: Warm, dry, color appropriate for ethnicity. Groin arteriotomy site soft and non-tender on palpation, dressing is C/D/I, with no active bleeding or drainage noted. Positive pedal pulses bilaterally. PLAN:   - Q hourly neuro checks   -CT 12/11 am shows interval resolution of bilateral subdural collections. -SBP goal . Hydralazine prn.   -Urology came in the am, parker removed. Patient denies any further problems with uriniation.   -New onset fever with tachycardia and hypotension this am, managed by Intensivist.   -Outpatient follow up per Neurosurgery.      Edwar Villareal NP  Neurocritical Care Nurse Practitioner  [phone]

## 2020-12-12 NOTE — ACP (ADVANCE CARE PLANNING)
Current Advanced Directive/Advance Care Plan:    Daughter reports pt does not have an AMD at this time.      Prasanna Holt RN, BSN  Care Management Department

## 2020-12-13 LAB
ALBUMIN SERPL-MCNC: 3 G/DL (ref 3.5–5)
ALBUMIN/GLOB SERPL: 1 {RATIO} (ref 1.1–2.2)
ALP SERPL-CCNC: 61 U/L (ref 45–117)
ALT SERPL-CCNC: 31 U/L (ref 12–78)
ANION GAP SERPL CALC-SCNC: 4 MMOL/L (ref 5–15)
AST SERPL-CCNC: 43 U/L (ref 15–37)
BASOPHILS # BLD: 0 K/UL (ref 0–0.1)
BASOPHILS NFR BLD: 0 % (ref 0–1)
BILIRUB SERPL-MCNC: 0.5 MG/DL (ref 0.2–1)
BUN SERPL-MCNC: 16 MG/DL (ref 6–20)
BUN/CREAT SERPL: 15 (ref 12–20)
CALCIUM SERPL-MCNC: 8.5 MG/DL (ref 8.5–10.1)
CHLORIDE SERPL-SCNC: 109 MMOL/L (ref 97–108)
CO2 SERPL-SCNC: 28 MMOL/L (ref 21–32)
CREAT SERPL-MCNC: 1.06 MG/DL (ref 0.7–1.3)
DATE LAST DOSE: NORMAL
DIFFERENTIAL METHOD BLD: ABNORMAL
EOSINOPHIL # BLD: 0.1 K/UL (ref 0–0.4)
EOSINOPHIL NFR BLD: 1 % (ref 0–7)
ERYTHROCYTE [DISTWIDTH] IN BLOOD BY AUTOMATED COUNT: 14.1 % (ref 11.5–14.5)
GLOBULIN SER CALC-MCNC: 3 G/DL (ref 2–4)
GLUCOSE SERPL-MCNC: 124 MG/DL (ref 65–100)
HCT VFR BLD AUTO: 36.7 % (ref 36.6–50.3)
HGB BLD-MCNC: 11.7 G/DL (ref 12.1–17)
IMM GRANULOCYTES # BLD AUTO: 0.3 K/UL (ref 0–0.04)
IMM GRANULOCYTES NFR BLD AUTO: 2 % (ref 0–0.5)
LYMPHOCYTES # BLD: 0.6 K/UL (ref 0.8–3.5)
LYMPHOCYTES NFR BLD: 5 % (ref 12–49)
MCH RBC QN AUTO: 29.2 PG (ref 26–34)
MCHC RBC AUTO-ENTMCNC: 31.9 G/DL (ref 30–36.5)
MCV RBC AUTO: 91.5 FL (ref 80–99)
MONOCYTES # BLD: 0.6 K/UL (ref 0–1)
MONOCYTES NFR BLD: 5 % (ref 5–13)
NEUTS SEG # BLD: 11.1 K/UL (ref 1.8–8)
NEUTS SEG NFR BLD: 87 % (ref 32–75)
NRBC # BLD: 0 K/UL (ref 0–0.01)
NRBC BLD-RTO: 0 PER 100 WBC
PLATELET # BLD AUTO: 124 K/UL (ref 150–400)
PMV BLD AUTO: 10.9 FL (ref 8.9–12.9)
POTASSIUM SERPL-SCNC: 3.4 MMOL/L (ref 3.5–5.1)
PROT SERPL-MCNC: 6 G/DL (ref 6.4–8.2)
RBC # BLD AUTO: 4.01 M/UL (ref 4.1–5.7)
RBC MORPH BLD: ABNORMAL
REPORTED DOSE,DOSE: NORMAL UNITS
REPORTED DOSE/TIME,TMG: NORMAL
SODIUM SERPL-SCNC: 141 MMOL/L (ref 136–145)
VANCOMYCIN TROUGH SERPL-MCNC: 9.1 UG/ML (ref 5–10)
WBC # BLD AUTO: 12.7 K/UL (ref 4.1–11.1)

## 2020-12-13 PROCEDURE — 97530 THERAPEUTIC ACTIVITIES: CPT

## 2020-12-13 PROCEDURE — 74011000258 HC RX REV CODE- 258: Performed by: NURSE PRACTITIONER

## 2020-12-13 PROCEDURE — 87040 BLOOD CULTURE FOR BACTERIA: CPT

## 2020-12-13 PROCEDURE — 80202 ASSAY OF VANCOMYCIN: CPT

## 2020-12-13 PROCEDURE — 74011250636 HC RX REV CODE- 250/636: Performed by: HOSPITALIST

## 2020-12-13 PROCEDURE — 74011000250 HC RX REV CODE- 250: Performed by: NURSE PRACTITIONER

## 2020-12-13 PROCEDURE — 74011250636 HC RX REV CODE- 250/636: Performed by: NURSE PRACTITIONER

## 2020-12-13 PROCEDURE — 74011000258 HC RX REV CODE- 258: Performed by: HOSPITALIST

## 2020-12-13 PROCEDURE — 74011250637 HC RX REV CODE- 250/637: Performed by: INTERNAL MEDICINE

## 2020-12-13 PROCEDURE — 65660000000 HC RM CCU STEPDOWN

## 2020-12-13 PROCEDURE — 74011250637 HC RX REV CODE- 250/637: Performed by: NURSE PRACTITIONER

## 2020-12-13 PROCEDURE — 99232 SBSQ HOSP IP/OBS MODERATE 35: CPT | Performed by: STUDENT IN AN ORGANIZED HEALTH CARE EDUCATION/TRAINING PROGRAM

## 2020-12-13 PROCEDURE — 94640 AIRWAY INHALATION TREATMENT: CPT

## 2020-12-13 PROCEDURE — 97165 OT EVAL LOW COMPLEX 30 MIN: CPT

## 2020-12-13 PROCEDURE — 74011000250 HC RX REV CODE- 250: Performed by: INTERNAL MEDICINE

## 2020-12-13 PROCEDURE — 85025 COMPLETE CBC W/AUTO DIFF WBC: CPT

## 2020-12-13 PROCEDURE — 36415 COLL VENOUS BLD VENIPUNCTURE: CPT

## 2020-12-13 PROCEDURE — 80053 COMPREHEN METABOLIC PANEL: CPT

## 2020-12-13 RX ORDER — BACITRACIN 500 UNIT/G
1 PACKET (EA) TOPICAL ONCE
Status: COMPLETED | OUTPATIENT
Start: 2020-12-13 | End: 2020-12-13

## 2020-12-13 RX ORDER — VANCOMYCIN HYDROCHLORIDE
1250 EVERY 12 HOURS
Status: DISCONTINUED | OUTPATIENT
Start: 2020-12-13 | End: 2020-12-14

## 2020-12-13 RX ORDER — DOCUSATE SODIUM 100 MG/1
100 CAPSULE, LIQUID FILLED ORAL DAILY
Status: DISCONTINUED | OUTPATIENT
Start: 2020-12-14 | End: 2020-12-17 | Stop reason: HOSPADM

## 2020-12-13 RX ORDER — POTASSIUM CHLORIDE 750 MG/1
40 TABLET, FILM COATED, EXTENDED RELEASE ORAL ONCE
Status: COMPLETED | OUTPATIENT
Start: 2020-12-13 | End: 2020-12-13

## 2020-12-13 RX ADMIN — PIPERACILLIN AND TAZOBACTAM 3.38 G: 3; .375 INJECTION, POWDER, LYOPHILIZED, FOR SOLUTION INTRAVENOUS at 11:42

## 2020-12-13 RX ADMIN — TAMSULOSIN HYDROCHLORIDE 0.4 MG: 0.4 CAPSULE ORAL at 21:40

## 2020-12-13 RX ADMIN — LEVETIRACETAM 500 MG: 500 TABLET ORAL at 21:40

## 2020-12-13 RX ADMIN — PANTOPRAZOLE SODIUM 40 MG: 40 TABLET, DELAYED RELEASE ORAL at 08:00

## 2020-12-13 RX ADMIN — CEFEPIME HYDROCHLORIDE 1 G: 1 INJECTION, POWDER, FOR SOLUTION INTRAMUSCULAR; INTRAVENOUS at 21:40

## 2020-12-13 RX ADMIN — VANCOMYCIN HYDROCHLORIDE 1250 MG: 10 INJECTION, POWDER, LYOPHILIZED, FOR SOLUTION INTRAVENOUS at 11:42

## 2020-12-13 RX ADMIN — FINASTERIDE 5 MG: 5 TABLET, FILM COATED ORAL at 21:40

## 2020-12-13 RX ADMIN — BACITRACIN 1 PACKET: 500 OINTMENT TOPICAL at 09:22

## 2020-12-13 RX ADMIN — LEVETIRACETAM 500 MG: 500 TABLET ORAL at 08:00

## 2020-12-13 RX ADMIN — CETIRIZINE HYDROCHLORIDE 10 MG: 10 TABLET, FILM COATED ORAL at 08:00

## 2020-12-13 RX ADMIN — BUDESONIDE 500 MCG: 0.5 INHALANT RESPIRATORY (INHALATION) at 23:06

## 2020-12-13 RX ADMIN — POTASSIUM CHLORIDE 40 MEQ: 750 TABLET, FILM COATED, EXTENDED RELEASE ORAL at 17:15

## 2020-12-13 RX ADMIN — PIPERACILLIN AND TAZOBACTAM 3.38 G: 3; .375 INJECTION, POWDER, LYOPHILIZED, FOR SOLUTION INTRAVENOUS at 04:43

## 2020-12-13 NOTE — ROUTINE PROCESS
TRANSFER - IN REPORT:    Verbal report received from David Weber RN (name) on Isadore Layer  being received from ICU (unit) for routine progression of care      Report consisted of patients Situation, Background, Assessment and   Recommendations(SBAR). Information from the following report(s) SBAR, Kardex, ED Summary, Procedure Summary, Intake/Output, MAR, Recent Results, Med Rec Status, Cardiac Rhythm NSR. , Alarm Parameters , Procedure Verification, Quality Measures and Dual Neuro Assessment was reviewed with the receiving nurse. Opportunity for questions and clarification was provided. Assessment completed upon patients arrival to unit and care assumed.

## 2020-12-13 NOTE — PROGRESS NOTES
Neurocritical Care Brief Progress Note:    Rounded on patient in ICU s/p cerebral angiogram and particle embolization of the middle meningeal arteries for SDH treatment on 12/10/20. States he feels much better. Denies any complaints tonight other than wanting to go home. Physical Exam:  Gen: NAD, calm, cooperative  ABD: Distended, firm, positive bowel sounds. Neuro: A&Ox4. Follows commands. Speech clear. Affect normal. PERRL, 3 mm bilaterally. Blinks to threat. No disconjugate gaze present. EOMI. Face symmetric. Palate symmetric. Tongue midline. Nancy spontaneously. Strength 5/5 in UE and LE BL. Negative drift. Bulk and tone normal. No involuntary movements. Gait deferred. Skin: Warm, dry, color appropriate for ethnicity. Extremities: Positive pedal pulses bilaterally. PLAN:   - Q  2 hour neuro checks   -CT 12/11 am shows interval resolution of bilateral subdural collections. -SBP goal . Hydralazine prn.   - Sepsis symptoms improving, preliminary blood cultures show enterococcus species growing in 2 of 4 bottles. Intensivist following.   -Outpatient follow up per Neurosurgery.      Napolean Kehr, NP  Neurocritical Care Nurse Practitioner  [phone]

## 2020-12-13 NOTE — PROGRESS NOTES
1930:Bedside and Verbal shift change report given to Tutu Veliz RN (oncoming nurse) by Adarsh Rios RN (offgoing nurse).  Report included the following information SBAR, Kardex, Intake/Output, MAR, Accordion, Recent Results, Med Rec Status and Cardiac Rhythm ST.

## 2020-12-13 NOTE — PROGRESS NOTES
Problem: Self Care Deficits Care Plan (Adult)  Goal: *Acute Goals and Plan of Care (Insert Text)  Description:   FUNCTIONAL STATUS PRIOR TO ADMISSION: Patient was independent with ADLs, IADLs, and functional mobility/ ambulatory without AD. Doing yard work and [employer] restoring 2 [employer] cars. HOME SUPPORT: Patient lived with wife but did not require assistance    Occupational Therapy Goals  Initiated 12/13/2020  1. Patient will perform grooming standing at sink with modified independence within 7 day(s). 2.  Patient will perform lower body dressing with modified independence within 7 day(s). 3.  Patient will perform bathing with modified independence within 7 day(s). 4.  Patient will perform toilet transfers with modified independence within 7 day(s). 5.  Patient will perform all aspects of toileting with modified independence within 7 day(s). 6.  Patient will utilize fall prevention techniques during functional activities with verbal cues within 7 day(s). Outcome: Progressing Towards Goal    OCCUPATIONAL THERAPY EVALUATION  Patient: Peter Brody (47 y.o. 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 Patient demonstrated good awareness of balance impairment and is functioning at contact guard assist level for ADLs and transfers. Anticipate d/c home with no follow-up OT needs pending progress in acute setting. Current Level of Function Impacting Discharge (ADLs/self-care): contact guard assistance     Functional Outcome Measure:   The patient scored Total A-D  Total A-D (Motor Function): 66/66 on the Fugl-Cano Assessment which is indicative of no impairment in upper extremity functional status. Scored 55/100 on Barthel Index, indicative of ~45% impairment in functional performance    Patient will benefit from skilled therapy intervention to address the above noted impairments. PLAN :  Recommendations and Planned Interventions: self care training, functional mobility training, therapeutic exercise, balance training, therapeutic activities, endurance activities, patient education, home safety training, and family training/education    Frequency/Duration: Patient will be followed by occupational therapy 5 times a week to address goals. Recommendation for discharge: (in order for the patient to meet his/her long term goals)  No skilled occupational therapy/ follow up rehabilitation needs identified at this time. This discharge recommendation:  Has not yet been discussed the attending provider and/or case management         SUBJECTIVE:   Patient stated I know my balance is off.     OBJECTIVE DATA SUMMARY:   HISTORY:   Past Medical History:   Diagnosis Date    Anxiety disorder     Arthritis     Asthma     BPH     Elevated PSA     GERD (gastroesophageal reflux disease)     Hearing reduced     Hypertension     Peyronie disease     Ringing in ear     Snoring      Past Surgical History:   Procedure Laterality Date    ABDOMEN SURGERY PROC UNLISTED      R inguinal hernia repair    COLONOSCOPY N/A 4/17/2020    COLONOSCOPY performed by Taisha Thakkar MD at [facility] ENDOSCOPY    HX CYST REMOVAL      cyst removal back    HX GI      colon polyp 1998; colonoscopy - needs every 5 yrs - 3/09 (-); repeat 3/2014 - Dr. Alpa TANG ear surgery - tympanomastoidectomy (revision 1st surgery 1973 for \"hole in TM\"; s\p choleostoma; b/l cataract surgery - followed at [facility]      Expanded or extensive additional review of patient history:     Home Situation  Home Environment: Private residence  One/Two Story Residence: One story  Living Alone: No  Support Systems: Family member(s)  Patient Expects to be Discharged to[de-identified] Private residence  Current DME Used/Available at Home: None      EXAMINATION OF PERFORMANCE DEFICITS:  Cognitive/Behavioral Status:  Neurologic State: Alert  Orientation Level: Oriented X4  Cognition: Appropriate decision making; Appropriate for age attention/concentration; Appropriate safety awareness; Follows commands  Perception: Appears intact  Perseveration: No perseveration noted  Safety/Judgement: Awareness of environment; Insight into deficits    Skin: visible skin appears intact    Edema: none noted    Hearing: WDL    Vision/Perceptual:    Tracking: Able to track stimulus in all quadrants w/o difficulty              Visual Fields: (able to detect stimuli in all fields)       Acuity: Within Defined Limits         Range of Motion:    AROM: Within functional limits                         Strength:    Strength: Within functional limits                Coordination:  Coordination: Within functional limits  Fine Motor Skills-Upper: Left Intact; Right Intact    Gross Motor Skills-Upper: Left Intact; Right Intact    Tone & Sensation:    Tone: Normal  Sensation: Intact                      Balance:  Sitting: Intact  Standing: Impaired; Without support  Standing - Static: Good  Standing - Dynamic : Fair    Functional Mobility and Transfers for ADLs:      Transfers:  Sit to Stand: Contact guard assistance  Stand to Sit: Contact guard assistance    ADL Assessment:  Feeding: Independent(inferred)    Oral Facial Hygiene/Grooming: Contact guard assistance(inferred standing)    Bathing: Contact guard assistance(inferred)    Upper Body Dressing: Contact guard assistance(inferred)    Lower Body Dressing: Contact guard assistance(inferred for standing, good LB access in tailor sit)    Toileting: Contact guard assistance(inferred)                ADL Intervention and task modifications:             Cognitive Retraining  Safety/Judgement: Awareness of environment; Insight into deficits      Functional Measure:    Barthel Index:    Bathin  Bladder: 10  Bowels: 10  Groomin  Dressin  Feeding: 10  Mobility: 0  Stairs: 0  Toilet Use: 5  Transfer (Bed to Chair and Back): 10  Total: 55/100        The Barthel ADL Index: Guidelines  1. The index should be used as a record of what a patient does, not as a record of what a patient could do. 2. The main aim is to establish degree of independence from any help, physical or verbal, however minor and for whatever reason. 3. The need for supervision renders the patient not independent. 4. A patient's performance should be established using the best available evidence. Asking the patient, friends/relatives and nurses are the usual sources, but direct observation and common sense are also important. However direct testing is not needed. 5. Usually the patient's performance over the preceding 24-48 hours is important, but occasionally longer periods will be relevant. 6. Middle categories imply that the patient supplies over 50 per cent of the effort. 7. Use of aids to be independent is allowed. Lauren Lazaro., Barthel, D.W. (1416). Functional evaluation: the Barthel Index. [facility] (14)2. CHELSI Downing, Sotero Santoyo., [place] Adams, [place], [address] [facility] (). Measuring the change indisability after inpatient rehabilitation; comparison of the responsiveness of the Barthel Index and Functional [place] Measure. Journal of Neurology, Neurosurgery, and Psychiatry, 66(4), 357-930. Danny Charles, N.J.A, WANG AguayoJBERNARDO, & Yashira Ledbetter MFRED. (2004.) Assessment of post-stroke quality of life in cost-effectiveness studies: The usefulness of the Barthel Index and the EuroQoL-5D.  Quality of Life Research, 13, 427-43       Fugl-Cano Assessment of Motor Recovery after Stroke:   Reflex Activity  Flexors/Biceps/Fingers: Can be elicited  Extensors/Triceps: Can be elicited  Reflex Subtotal: 4    Volitional Movement Within Synergies  Shoulder Retraction: Full  Shoulder Elevation: Full  Shoulder Abduction (90 degrees): Full  Shoulder External Rotation: Full  Elbow Flexion: Full  Forearm Supination: Full  Shoulder Adduction/Internal Rotation: Full  Elbow Extension: Full  Forearm Pronation: Full  Subtotal: 18    Volitional Movement Mixing Synergies  Hand to Lumbar Spine: Full  Shoulder Flexion (0-90 degrees): Full  Pronation-Supination: Full  Subtotal: 6    Volitional Movement With Little or No Synergy  Shoulder Abduction (0-90 degrees): Full  Shoulder Flexion ( degrees): Full  Pronation/Supination: Full  Subtotal : 6    Normal Reflex Activity  Biceps, Triceps, Finger Flexors: Full  Subtotal : 2    Upper Extremity Total   Upper Extremity Total: 36    Wrist  Stability at 15 Degree Dorsiflexion: Full  Repeated Dorsiflexion/ Volar Flexion: Full  Stability at 15 Degree Dorsiflexion: Full  Repeated Dorsiflexion/ Volar Flexion: Full  Circumduction: Full  Wrist Total: 10    Hand  Mass Flexion: Full  Mass Extension: Full  Grasp A: Full  Grasp B: Full  Grasp C: Full  Grasp D: Full  Grasp E: Full  Hand Total: 14    Coordination/Speed  Tremor: None  Dysmetria: None  Time: <1s  Coordination/Speed Total : 6    Total A-D  Total A-D (Motor Function): 66/66     This is a reliable/valid measure of arm function after a neurological event. It has established value to characterize functional status and for measuring spontaneous and therapy-induced recovery; tests proximal and distal motor functions. Fugl-Cano Assessment - UE scores recorded between five and 30 days post neurologic event can be used to predict UE recovery at six months post neurologic event. Severe = 0-21 points   Moderately Severe = 22-33 points   Moderate = 34-47 points   Mild = 48-66 points  HAROON Link, ANICETO Reynolds, & GERARD Gatica (1992). Measurement of motor recovery after stroke: Outcome assessment and sample size requirements.  Stroke, 21, pp. 7786-1024.   ------------------------------------------------------------------------------------------------------------------------------------------------------------------  MCID:  Stroke:   Isidra To et al, 2001; n = 171; mean age 79 (5) years; assessed within 16 (12) days of stroke, Acute Stroke)  FMA Motor Scores from Admission to Discharge   10 point increase in FMA Upper Extremity = 1.5 change in discharge FIM   10 point increase in FMA Lower Extremity = 1.9 change in discharge FIM  MDC:   Stroke:   Susanna Friedman et al, 2008, n = 14, mean age = 59.9 (14.6) years, assessed on average 14 (6.5) months post stroke, Chronic Stroke)   FMA = 5.2 points for the Upper Extremity portion of the assessment     Occupational Therapy Evaluation Charge Determination   History Examination Decision-Making   LOW Complexity : Brief history review  MEDIUM Complexity : 3-5 performance deficits relating to physical, cognitive , or psychosocial skils that result in activity limitations and / or participation restrictions MEDIUM Complexity : Patient may present with comorbidities that affect occupational performnce. Miniml to moderate modification of tasks or assistance (eg, physical or verbal ) with assesment(s) is necessary to enable patient to complete evaluation       Based on the above components, the patient evaluation is determined to be of the following complexity level: LOW   Pain Rating:  Patient did not report pain    Activity Tolerance:   VSS, good    After treatment patient left in no apparent distress:    Sitting in chair and Call bell within reach, RN notified    COMMUNICATION/EDUCATION:   The patients plan of care was discussed with: Registered nurse. Patient was educated regarding his deficit(s) of impaired balance as this relates to his diagnosis of SDH. He demonstrated Good understanding as evidenced by verbalization.     Patient and/or family was verbally educated on the BE FAST acronym for signs/symptoms of CVA and TIA. BE FAST was written on patient's communication board  for visual education and reinforcement. All questions answered with patient indicating good understanding. Home safety education was provided and the patient/caregiver indicated understanding., Patient/family have participated as able in goal setting and plan of care. , and Patient/family agree to work toward stated goals and plan of care. This patients plan of care is appropriate for delegation to MARANDA.     Thank you for this referral.  Valdez Reddy OT  Time Calculation: 15 mins
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
## 2020-12-13 NOTE — PROGRESS NOTES
Day # 3 of Vancomycin  Indication: Enterococcus bacteremia  Current regimen:  1250 mg Q 16hr  Abx regimen:  zosyn+vanc  ID Following ?: NO  Concomitant nephrotoxic drugs (requires more frequent monitoring): None - pressors off > 24h  Frequency of BMP?:daily    Recent Labs     20  0418 20  0359 12/10/20  1912   WBC 22.3* 6.3 8.2   CREA 1.04 0.91  --    BUN 16 11  --      Est CrCl: ~ 60-65  ml/min; UO: 0.5ml/kg/hr  Temp (24hrs), Av.2 °F (37.9 °C), Min:98.2 °F (36.8 °C), Max:103 °F (39.4 °C)    Cultures:    blood: probable Enterococcus spp in 4/4 bottles; prelim   blood: in process    Goal trough = 15 - 20 mcg/mL    Recent trough history (date/time/level/dose/action taken):  : vanc trough = 9.1; frequency changed to 1.25g q12h    Plan: Change to vanc 1.25g q12h for calculated troughs ~15 mcg/ml. Will assess new dosing once at steady state.     Jeremiah Vaughn, [facility]

## 2020-12-13 NOTE — PROGRESS NOTES
Bedside shift change report given to Bj Ontiveros RN (oncoming nurse) by Stephen Carlton RN (offgoing nurse). Report included the following information SBAR, Kardex, ED Summary, Procedure Summary, Intake/Output, MAR, Recent Results, Med Rec Status, Cardiac Rhythm NSR, Alarm Parameters , Quality Measures and Dual Neuro Assessment. 8870: C line removed per order. TRANSFER - OUT REPORT:    Verbal report given to Rakesh RN(name) on Char Eason  being transferred to NSTU(unit) for routine progression of care       Report consisted of patients Situation, Background, Assessment and   Recommendations(SBAR). Information from the following report(s) SBAR, Kardex, ED Summary, Procedure Summary, Intake/Output, MAR, Recent Results, Med Rec Status, Cardiac Rhythm NSR, Alarm Parameters , Quality Measures and Dual Neuro Assessment was reviewed with the receiving nurse. Lines:   Peripheral IV 12/10/20 Left Antecubital (Active)   Site Assessment Clean, dry, & intact 12/13/20 0800   Phlebitis Assessment 0 12/13/20 0800   Infiltration Assessment 0 12/13/20 0800   Dressing Status Clean, dry, & intact 12/13/20 0800   Dressing Type Tape;Transparent 12/13/20 0800   Hub Color/Line Status Pink;Capped 12/13/20 0800   Action Taken Open ports on tubing capped 12/13/20 0800   Alcohol Cap Used Yes 12/13/20 0800       Peripheral IV 12/11/20 Anterior;Proximal;Right Forearm (Active)   Site Assessment Clean, dry, & intact 12/13/20 0800   Phlebitis Assessment 0 12/13/20 0800   Infiltration Assessment 0 12/13/20 0800   Dressing Status Clean, dry, & intact 12/13/20 0800   Dressing Type Tape;Transparent 12/13/20 0800   Hub Color/Line Status Green;Capped 12/13/20 0800   Action Taken Open ports on tubing capped 12/13/20 0800   Alcohol Cap Used Yes 12/13/20 0800        Opportunity for questions and clarification was provided.       Patient transported with:   [place]

## 2020-12-14 ENCOUNTER — APPOINTMENT (OUTPATIENT)
Dept: NON INVASIVE DIAGNOSTICS | Age: 80
DRG: 025 | End: 2020-12-14
Attending: HOSPITALIST
Payer: MEDICARE

## 2020-12-14 LAB
ANION GAP SERPL CALC-SCNC: 2 MMOL/L (ref 5–15)
BACTERIA SPEC CULT: ABNORMAL
BACTERIA SPEC CULT: ABNORMAL
BASOPHILS # BLD: 0.1 K/UL (ref 0–0.1)
BASOPHILS NFR BLD: 1 % (ref 0–1)
BUN SERPL-MCNC: 14 MG/DL (ref 6–20)
BUN/CREAT SERPL: 15 (ref 12–20)
CALCIUM SERPL-MCNC: 8 MG/DL (ref 8.5–10.1)
CHLORIDE SERPL-SCNC: 110 MMOL/L (ref 97–108)
CO2 SERPL-SCNC: 30 MMOL/L (ref 21–32)
CREAT SERPL-MCNC: 0.94 MG/DL (ref 0.7–1.3)
DIFFERENTIAL METHOD BLD: ABNORMAL
EOSINOPHIL # BLD: 0.3 K/UL (ref 0–0.4)
EOSINOPHIL NFR BLD: 3 % (ref 0–7)
ERYTHROCYTE [DISTWIDTH] IN BLOOD BY AUTOMATED COUNT: 14 % (ref 11.5–14.5)
GLUCOSE SERPL-MCNC: 105 MG/DL (ref 65–100)
HCT VFR BLD AUTO: 33.7 % (ref 36.6–50.3)
HGB BLD-MCNC: 10.9 G/DL (ref 12.1–17)
IMM GRANULOCYTES # BLD AUTO: 0.1 K/UL (ref 0–0.04)
IMM GRANULOCYTES NFR BLD AUTO: 1 % (ref 0–0.5)
LYMPHOCYTES # BLD: 0.9 K/UL (ref 0.8–3.5)
LYMPHOCYTES NFR BLD: 9 % (ref 12–49)
MCH RBC QN AUTO: 29.1 PG (ref 26–34)
MCHC RBC AUTO-ENTMCNC: 32.3 G/DL (ref 30–36.5)
MCV RBC AUTO: 89.9 FL (ref 80–99)
MONOCYTES # BLD: 0.7 K/UL (ref 0–1)
MONOCYTES NFR BLD: 8 % (ref 5–13)
NEUTS SEG # BLD: 7.3 K/UL (ref 1.8–8)
NEUTS SEG NFR BLD: 78 % (ref 32–75)
NRBC # BLD: 0 K/UL (ref 0–0.01)
NRBC BLD-RTO: 0 PER 100 WBC
PLATELET # BLD AUTO: 125 K/UL (ref 150–400)
PMV BLD AUTO: 11.1 FL (ref 8.9–12.9)
POTASSIUM SERPL-SCNC: 3.8 MMOL/L (ref 3.5–5.1)
RBC # BLD AUTO: 3.75 M/UL (ref 4.1–5.7)
SERVICE CMNT-IMP: ABNORMAL
SODIUM SERPL-SCNC: 142 MMOL/L (ref 136–145)
WBC # BLD AUTO: 9.3 K/UL (ref 4.1–11.1)

## 2020-12-14 PROCEDURE — 74011250637 HC RX REV CODE- 250/637: Performed by: INTERNAL MEDICINE

## 2020-12-14 PROCEDURE — 74011250636 HC RX REV CODE- 250/636: Performed by: RADIOLOGY

## 2020-12-14 PROCEDURE — 80048 BASIC METABOLIC PNL TOTAL CA: CPT

## 2020-12-14 PROCEDURE — 97535 SELF CARE MNGMENT TRAINING: CPT

## 2020-12-14 PROCEDURE — 97530 THERAPEUTIC ACTIVITIES: CPT

## 2020-12-14 PROCEDURE — 74011250636 HC RX REV CODE- 250/636: Performed by: HOSPITALIST

## 2020-12-14 PROCEDURE — 77010033678 HC OXYGEN DAILY

## 2020-12-14 PROCEDURE — 97116 GAIT TRAINING THERAPY: CPT

## 2020-12-14 PROCEDURE — 74011250637 HC RX REV CODE- 250/637: Performed by: NURSE PRACTITIONER

## 2020-12-14 PROCEDURE — 36415 COLL VENOUS BLD VENIPUNCTURE: CPT

## 2020-12-14 PROCEDURE — 74011250636 HC RX REV CODE- 250/636: Performed by: INTERNAL MEDICINE

## 2020-12-14 PROCEDURE — 74011000258 HC RX REV CODE- 258: Performed by: HOSPITALIST

## 2020-12-14 PROCEDURE — 65660000000 HC RM CCU STEPDOWN

## 2020-12-14 PROCEDURE — 94640 AIRWAY INHALATION TREATMENT: CPT

## 2020-12-14 PROCEDURE — 99232 SBSQ HOSP IP/OBS MODERATE 35: CPT | Performed by: RADIOLOGY

## 2020-12-14 PROCEDURE — 85025 COMPLETE CBC W/AUTO DIFF WBC: CPT

## 2020-12-14 PROCEDURE — 94664 DEMO&/EVAL PT USE INHALER: CPT

## 2020-12-14 PROCEDURE — 74011000258 HC RX REV CODE- 258: Performed by: INTERNAL MEDICINE

## 2020-12-14 PROCEDURE — 74011000250 HC RX REV CODE- 250: Performed by: NURSE PRACTITIONER

## 2020-12-14 RX ORDER — MAG HYDROX/ALUMINUM HYD/SIMETH 200-200-20
30 SUSPENSION, ORAL (FINAL DOSE FORM) ORAL ONCE
Status: COMPLETED | OUTPATIENT
Start: 2020-12-14 | End: 2020-12-14

## 2020-12-14 RX ORDER — MAG HYDROX/ALUMINUM HYD/SIMETH 200-200-20
30 SUSPENSION, ORAL (FINAL DOSE FORM) ORAL
Status: DISCONTINUED | OUTPATIENT
Start: 2020-12-14 | End: 2020-12-17 | Stop reason: HOSPADM

## 2020-12-14 RX ADMIN — CETIRIZINE HYDROCHLORIDE 10 MG: 10 TABLET, FILM COATED ORAL at 09:55

## 2020-12-14 RX ADMIN — TAMSULOSIN HYDROCHLORIDE 0.4 MG: 0.4 CAPSULE ORAL at 21:21

## 2020-12-14 RX ADMIN — CEFEPIME HYDROCHLORIDE 1 G: 1 INJECTION, POWDER, FOR SOLUTION INTRAMUSCULAR; INTRAVENOUS at 09:50

## 2020-12-14 RX ADMIN — FINASTERIDE 5 MG: 5 TABLET, FILM COATED ORAL at 21:21

## 2020-12-14 RX ADMIN — PIPERACILLIN AND TAZOBACTAM 3.38 G: 3; .375 INJECTION, POWDER, LYOPHILIZED, FOR SOLUTION INTRAVENOUS at 21:21

## 2020-12-14 RX ADMIN — PANTOPRAZOLE SODIUM 40 MG: 40 TABLET, DELAYED RELEASE ORAL at 07:01

## 2020-12-14 RX ADMIN — BUDESONIDE 500 MCG: 0.5 INHALANT RESPIRATORY (INHALATION) at 20:39

## 2020-12-14 RX ADMIN — ALUMINUM HYDROXIDE, MAGNESIUM HYDROXIDE, AND SIMETHICONE 30 ML: 200; 200; 20 SUSPENSION ORAL at 09:50

## 2020-12-14 RX ADMIN — VANCOMYCIN HYDROCHLORIDE 1250 MG: 10 INJECTION, POWDER, LYOPHILIZED, FOR SOLUTION INTRAVENOUS at 00:00

## 2020-12-14 RX ADMIN — LEVETIRACETAM 500 MG: 500 TABLET ORAL at 21:21

## 2020-12-14 RX ADMIN — LEVETIRACETAM 500 MG: 500 TABLET ORAL at 09:50

## 2020-12-14 RX ADMIN — PIPERACILLIN AND TAZOBACTAM 3.38 G: 3; .375 INJECTION, POWDER, LYOPHILIZED, FOR SOLUTION INTRAVENOUS at 14:07

## 2020-12-14 RX ADMIN — VANCOMYCIN HYDROCHLORIDE 1250 MG: 10 INJECTION, POWDER, LYOPHILIZED, FOR SOLUTION INTRAVENOUS at 11:50

## 2020-12-14 RX ADMIN — DOCUSATE SODIUM 100 MG: 100 CAPSULE, LIQUID FILLED ORAL at 09:50

## 2020-12-14 NOTE — PROGRESS NOTES
Problem: Self Care Deficits Care Plan (Adult)  Goal: *Acute Goals and Plan of Care (Insert Text)  Description:   FUNCTIONAL STATUS PRIOR TO ADMISSION: Patient was independent with ADLs, IADLs, and functional mobility/ ambulatory without AD. Doing yard work and [employer] restoring 2 [employer] cars. HOME SUPPORT: Patient lived with wife but did not require assistance    Occupational Therapy Goals  Initiated 12/13/2020  1. Patient will perform grooming standing at sink with modified independence within 7 day(s). 2.  Patient will perform lower body dressing with modified independence within 7 day(s). 3.  Patient will perform bathing with modified independence within 7 day(s). 4.  Patient will perform toilet transfers with modified independence within 7 day(s). 5.  Patient will perform all aspects of toileting with modified independence within 7 day(s). 6.  Patient will utilize fall prevention techniques during functional activities with verbal cues within 7 day(s). Outcome: Progressing Towards Goal    OCCUPATIONAL THERAPY TREATMENT  Patient: Sharath Nagy (89 y.o. male)  Date: 12/14/2020  Diagnosis: Subdural hematoma ([facility] [address] [S06.5X9A]  Subdural hematoma ([facility] [address] [G19.4Q8N]   <principal problem not specified>    4 Days Post-Op  Precautions: Fall  Chart, occupational therapy assessment, plan of care, and goals were reviewed. ASSESSMENT  Patient continues with skilled OT services and is progressing towards goals. Patient presents with mild balance impairment, but improving, and is otherwise neurologically intact. Patient performed sit-stand, ambulated to bathroom for standing grooming, practiced ambulating within room + multilevel reaching at cabinets + ambulated ~300 feet with up to stand-by assistance. Noted patient will need IV abx at d/c and practiced self-managing IV pole during all tasks/ through confined spaces and doorways. Patient receptive to education on fall prevention.   Recommend d/c home with no OT needs when medically stable. Current Level of Function Impacting Discharge (ADLs): stand-by assistance         PLAN :  Patient continues to benefit from skilled intervention to address the above impairments. Continue treatment per established plan of care. to address goals. Recommendation for discharge: (in order for the patient to meet his/her long term goals)  No skilled occupational therapy/ follow up rehabilitation needs identified at this time. This discharge recommendation:  Has not yet been discussed the attending provider and/or case management       SUBJECTIVE:   Patient stated My balance was like this after my last surgery.     OBJECTIVE DATA SUMMARY:   Cognitive/Behavioral Status:  Neurologic State: Alert  Orientation Level: Oriented X4  Cognition: Appropriate decision making; Appropriate for age attention/concentration; Appropriate safety awareness; Follows commands; Impaired decision making  Perception: Appears intact  Perseveration: No perseveration noted       Functional Mobility and Transfers for ADLs:  Bed Mobility:  Supine to Sit: Supervision  Scooting: Supervision    Transfers:  Sit to Stand: Stand-by assistance          Balance:  Sitting: Intact  Standing: Impaired  Standing - Static: Good  Standing - Dynamic : Good    ADL Intervention:       Grooming  Washing Face: Stand-by assistance(standing at sink)          Pain:  Patient did not report pain    Activity Tolerance:   VSS, good    After treatment patient left in no apparent distress:   Sitting in chair, Call bell within reach, and Bed / chair alarm activated    COMMUNICATION/COLLABORATION:   The patients plan of care was discussed with: Physical therapist and Registered nurse. Patient was educated regarding His deficit(s) of impaired balance as this relates to His diagnosis of SDH. He demonstrated Good understanding as evidenced by verbalization.     Patient and/or family was verbally educated on the BE FAST acronym for signs/symptoms of CVA and TIA. Provided with BEFAST handout. All questions answered with patient indicating good understanding.      Jose Mantilla OT  Time Calculation: 28 mins
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
## 2020-12-14 NOTE — CONSULTS
ID Consult Note  NAME:  Sharath Nagy   :   1940   MRN:   [id number]   Date/Time:  2020 1:30 PM  Subjective:   REASON FOR CONSULT: Enterococcal bacteremia     Leticia Tierney is a 68 y.o. with a history of BPH, chronic subdural hematomas. The patient has had chronic subdural hematomas. In 2020, he had a procedure done by Dr. Miriam Gonzáles at [facility].  He was then referred to neurointerventional to have a particle embolization of bilateral frontal MMA branches. He was electively admitted on December 10 and the procedure was done. Prior to this, he did not have any dysuria, fever or chills. He then developed hematuria after Zheng catheter manipulation. Soon after that he developed fever, chills and dysuria. He also developed gross hematuria. Urology was consulted and they recommended taking the Zheng catheter out. His blood cultures grew out enterococcus faecalis. His urine culture is likely going to grow out Enterococcus as well as a gram-negative jonathon. He had a CT of the abdomen which showed no acute finding. He is currently on vancomycin and cefepime and we are being asked to see him in consult.     Past Medical History:   Diagnosis Date    Anxiety disorder     Arthritis     Asthma     BPH     Elevated PSA     GERD (gastroesophageal reflux disease)     Hearing reduced     Hypertension     Peyronie disease     Ringing in ear     Snoring       Past Surgical History:   Procedure Laterality Date    ABDOMEN SURGERY PROC UNLISTED      R inguinal hernia repair    COLONOSCOPY N/A 2020    COLONOSCOPY performed by Dilia Barajas MD at [facility] ENDOSCOPY    HX CYST REMOVAL      cyst removal back    HX GI      colon polyp ; colonoscopy - needs every 5 yrs - 3/09 (-); repeat 3/2014 - Dr. Taisha Briceño HX HEENT      L ear surgery - tympanomastoidectomy (revision 1st surgery  for \"hole in TM\"; s\p choleostoma; b/l cataract surgery - followed at [facility] Social History     Tobacco Use    Smoking status: Former Smoker     Last attempt to quit: 1970     Years since quittin.9    Smokeless tobacco: Never Used    Tobacco comment: started age [age] - <1ppd stopped    Substance Use Topics    Alcohol use: Not Currently      Family History   Problem Relation Age of Onset    Cancer Mother         gall baldder      Allergies   Allergen Reactions    Melon Flavor Swelling     Facial swelling with watermelon and cantaloupe      Home Medications:  Prior to Admission Medications   Prescriptions Last Dose Informant Patient Reported? Taking? Cetirizine (ZYRTEC) 10 mg cap 12/10/2020 at Unknown time  Yes Yes   Sig: Take 10 mg by mouth daily. MULTIVITAMINS (MULTIVITAMIN PO) 12/10/2020 at Unknown time  Yes Yes   Sig: Take 1 Tab by mouth daily. albuterol (PROVENTIL HFA, VENTOLIN HFA, PROAIR HFA) 90 mcg/actuation inhaler 11/10/2020 at Unknown time  No Yes   Sig: Take 1 Puff by inhalation every four (4) hours as needed for Wheezing. cholecalciferol (Vitamin D3) 25 mcg (1,000 unit) cap 12/10/2020 at Unknown time  Yes Yes   Sig: Take 1,000 Units by mouth daily. finasteride (PROSCAR) 5 mg tablet 2020 at Unknown time  Yes Yes   Sig: Take 5 mg by mouth nightly. fluticasone propionate (FLONASE) 50 mcg/actuation nasal spray 12/10/2020 at Unknown time  Yes Yes   Si Sprays by Both Nostrils route daily. hydroCHLOROthiazide (HYDRODIURIL) 25 mg tablet 12/10/2020 at Unknown time  Yes Yes   Sig: Take 12.5 mg by mouth daily. latanoprost (XALATAN) 0.005 % ophthalmic solution   Yes Yes   Sig: Administer 1 Drop to left eye nightly. levETIRAcetam (KEPPRA) 500 mg tablet 12/10/2020 at Unknown time  Yes Yes   Sig: Take 500 mg by mouth every twelve (12) hours. mometasone (ASMANEX TWISTHALER) 220 mcg/ actuation (60) inhaler 2020 at Unknown time  Yes Yes   Sig: Take 2 Puffs by inhalation nightly as needed.    pantoprazole (PROTONIX) 40 mg tablet 12/10/2020 at Unknown time  Yes Yes   Sig: Take 40 mg by mouth daily. psyllium (METAMUCIL) powd   Yes Yes   Sig: Take 1 Packet by mouth every other day. sertraline (Zoloft) 100 mg tablet Unknown at Unknown time  Yes No   Sig: Take 50 mg by mouth daily as needed. Patient states he takes about once per week   tamsulosin (FLOMAX) 0.4 mg capsule 12/9/2020 at Unknown time  No Yes   Sig: Take 1 Cap by mouth nightly.       Facility-Administered Medications: None     Hospital medications:  Current Facility-Administered Medications   Medication Dose Route Frequency    alum-mag hydroxide-simeth (MYLANTA) oral suspension 30 mL  30 mL Oral TID PRN    vancomycin (VANCOCIN) 1250 mg in  ml infusion  1,250 mg IntraVENous Q12H    vancomycin - pharmacy to dose   Other Rx Dosing/Monitoring    docusate sodium (COLACE) capsule 100 mg  100 mg Oral DAILY    cefepime (MAXIPIME) 1 g in 0.9% sodium chloride (MBP/ADV) 50 mL MBP  1 g IntraVENous Q12H    bisacodyL (DULCOLAX) suppository 10 mg  10 mg Rectal DAILY PRN    acetaminophen (TYLENOL) tablet 650 mg  650 mg Oral Q6H PRN    albuterol-ipratropium (DUO-NEB) 2.5 MG-0.5 MG/3 ML  3 mL Nebulization Q6H PRN    cetirizine (ZYRTEC) tablet 10 mg  10 mg Oral DAILY    fluticasone propionate (FLONASE) 50 mcg/actuation nasal spray 2 Spray  2 Spray Both Nostrils DAILY    levETIRAcetam (KEPPRA) tablet 500 mg  500 mg Oral Q12H    tamsulosin (FLOMAX) capsule 0.4 mg  0.4 mg Oral QHS    ondansetron (ZOFRAN) injection 4 mg  4 mg IntraVENous Q4H PRN    hydrALAZINE (APRESOLINE) 20 mg/mL injection 10 mg  10 mg IntraVENous Q6H PRN    finasteride (PROSCAR) tablet 5 mg  5 mg Oral QHS    budesonide (PULMICORT) 500 mcg/2 ml nebulizer suspension  500 mcg Nebulization QHS    pantoprazole (PROTONIX) tablet 40 mg  40 mg Oral ACB     REVIEW OF SYSTEMS:        Const:   negative weight loss  Eyes:   negative diplopia or visual changes, negative eye pain  ENT:   negative coryza, negative sore throat  Resp: negative cough, hemoptysis, dyspnea  Cards:  negative for chest pain, palpitations  :  negative for frequency  GI:   Negative for hematemesis and hematochezia  Skin:   negative for rash and pruritus  Heme:   negative for easy bruising and gum/nose bleeding  MS:  negative for myalgias, arthralgias, back pain and muscle weakness  Neurolo:  negative for headaches, dizziness, vertigo, memory problems   Psych:  negative for hallucinations        Objective:   VITALS:    Visit Vitals  /68 (BP 1 Location: Right arm, BP Patient Position: At rest)   Pulse 89   Temp 98 °F (36.7 °C)   Resp 16   Wt 83.8 kg (184 lb 11.9 oz)   SpO2 94%   BMI 25.77 kg/m²     Temp (24hrs), Av.1 °F (36.7 °C), Min:97.8 °F (36.6 °C), Max:98.4 °F (36.9 °C)    PHYSICAL EXAM:   General:    Alert, cooperative, no distress, appears stated age. Head:   Normocephalic, without obvious abnormality, atraumatic. Eyes:   Conjunctivae clear, anicteric sclerae. Nose:  Nares normal.   Throat:    Lips and tongue normal.  No Thrush  Neck:  Supple, symmetrical    no carotid bruit and no JVD. :    No CVA tenderness, no parker catheter  Lungs:   Clear to auscultation bilaterally. No Wheezing or Rhonchi. No rales. Heart:   Regular rate and rhythm,  no murmur, rub or gallop. Abdomen:   Soft, non-tender,not distended. Bowel sounds normal.   Extremities: Knees, ankles, wrists, elbows are not warm and not tender. No pedal edema  Skin:     No rashes or lesions.   Not Jaundiced  Lymph: Cervical normal  Neurologic: Full use of extraocular muscles, no facial asymmetry, tongue midline muscle strength 5 out of 5    LAB DATA REVIEWED:    Recent Results (from the past 48 hour(s))   CULTURE, BLOOD, PAIRED    Collection Time: 20  9:16 AM    Specimen: Blood   Result Value Ref Range    Special Requests: NO SPECIAL REQUESTS      Culture result: NO GROWTH AFTER 21 HOURS     VANCOMYCIN, TROUGH    Collection Time: 20 11:35 AM   Result Value Ref Range Vancomycin,trough 9.1 5.0 - 10.0 ug/mL    Reported dose date NOT PROVIDED      Reported dose time: NOT PROVIDED      Reported dose: NOT PROVIDED UNITS   METABOLIC PANEL, COMPREHENSIVE    Collection Time: 12/13/20  1:04 PM   Result Value Ref Range    Sodium 141 136 - 145 mmol/L    Potassium 3.4 (L) 3.5 - 5.1 mmol/L    Chloride 109 (H) 97 - 108 mmol/L    CO2 28 21 - 32 mmol/L    Anion gap 4 (L) 5 - 15 mmol/L    Glucose 124 (H) 65 - 100 mg/dL    BUN 16 6 - 20 MG/DL    Creatinine 1.06 0.70 - 1.30 MG/DL    BUN/Creatinine ratio 15 12 - 20      GFR est AA >60 >60 ml/min/1.73m2    GFR est non-AA >60 >60 ml/min/1.73m2    Calcium 8.5 8.5 - 10.1 MG/DL    Bilirubin, total 0.5 0.2 - 1.0 MG/DL    ALT (SGPT) 31 12 - 78 U/L    AST (SGOT) 43 (H) 15 - 37 U/L    Alk. phosphatase 61 45 - 117 U/L    Protein, total 6.0 (L) 6.4 - 8.2 g/dL    Albumin 3.0 (L) 3.5 - 5.0 g/dL    Globulin 3.0 2.0 - 4.0 g/dL    A-G Ratio 1.0 (L) 1.1 - 2.2     CBC WITH AUTOMATED DIFF    Collection Time: 12/13/20  1:04 PM   Result Value Ref Range    WBC 12.7 (H) 4.1 - 11.1 K/uL    RBC 4.01 (L) 4.10 - 5.70 M/uL    HGB 11.7 (L) 12.1 - 17.0 g/dL    HCT 36.7 36.6 - 50.3 %    MCV 91.5 80.0 - 99.0 FL    MCH 29.2 26.0 - 34.0 PG    MCHC 31.9 30.0 - 36.5 g/dL    RDW 14.1 11.5 - 14.5 %    PLATELET 195 (L) 682 - 400 K/uL    MPV 10.9 8.9 - 12.9 FL    NRBC 0.0 0  WBC    ABSOLUTE NRBC 0.00 0.00 - 0.01 K/uL    NEUTROPHILS 87 (H) 32 - 75 %    LYMPHOCYTES 5 (L) 12 - 49 %    MONOCYTES 5 5 - 13 %    EOSINOPHILS 1 0 - 7 %    BASOPHILS 0 0 - 1 %    IMMATURE GRANULOCYTES 2 (H) 0.0 - 0.5 %    ABS. NEUTROPHILS 11.1 (H) 1.8 - 8.0 K/UL    ABS. LYMPHOCYTES 0.6 (L) 0.8 - 3.5 K/UL    ABS. MONOCYTES 0.6 0.0 - 1.0 K/UL    ABS. EOSINOPHILS 0.1 0.0 - 0.4 K/UL    ABS. BASOPHILS 0.0 0.0 - 0.1 K/UL    ABS. IMM.  GRANS. 0.3 (H) 0.00 - 0.04 K/UL    DF SMEAR SCANNED      RBC COMMENTS NORMOCYTIC, NORMOCHROMIC     METABOLIC PANEL, BASIC    Collection Time: 12/14/20  1:51 AM   Result Value Ref Range    Sodium 142 136 - 145 mmol/L    Potassium 3.8 3.5 - 5.1 mmol/L    Chloride 110 (H) 97 - 108 mmol/L    CO2 30 21 - 32 mmol/L    Anion gap 2 (L) 5 - 15 mmol/L    Glucose 105 (H) 65 - 100 mg/dL    BUN 14 6 - 20 MG/DL    Creatinine 0.94 0.70 - 1.30 MG/DL    BUN/Creatinine ratio 15 12 - 20      GFR est AA >60 >60 ml/min/1.73m2    GFR est non-AA >60 >60 ml/min/1.73m2    Calcium 8.0 (L) 8.5 - 10.1 MG/DL   CBC WITH AUTOMATED DIFF    Collection Time: 12/14/20  1:51 AM   Result Value Ref Range    WBC 9.3 4.1 - 11.1 K/uL    RBC 3.75 (L) 4.10 - 5.70 M/uL    HGB 10.9 (L) 12.1 - 17.0 g/dL    HCT 33.7 (L) 36.6 - 50.3 %    MCV 89.9 80.0 - 99.0 FL    MCH 29.1 26.0 - 34.0 PG    MCHC 32.3 30.0 - 36.5 g/dL    RDW 14.0 11.5 - 14.5 %    PLATELET 969 (L) 954 - 400 K/uL    MPV 11.1 8.9 - 12.9 FL    NRBC 0.0 0  WBC    ABSOLUTE NRBC 0.00 0.00 - 0.01 K/uL    NEUTROPHILS 78 (H) 32 - 75 %    LYMPHOCYTES 9 (L) 12 - 49 %    MONOCYTES 8 5 - 13 %    EOSINOPHILS 3 0 - 7 %    BASOPHILS 1 0 - 1 %    IMMATURE GRANULOCYTES 1 (H) 0.0 - 0.5 %    ABS. NEUTROPHILS 7.3 1.8 - 8.0 K/UL    ABS. LYMPHOCYTES 0.9 0.8 - 3.5 K/UL    ABS. MONOCYTES 0.7 0.0 - 1.0 K/UL    ABS. EOSINOPHILS 0.3 0.0 - 0.4 K/UL    ABS. BASOPHILS 0.1 0.0 - 0.1 K/UL    ABS. IMM. GRANS. 0.1 (H) 0.00 - 0.04 K/UL    DF AUTOMATED           IMPRESSION    #1 enterococcal bacteremia likely from traumatic Zheng manipulation    #2 chronic subdural hematomas status post middle meningeal artery embolization    #3 BPH    # 4 asthma    PLAN    I think the enterococcal bacteremia is coming from the urine. The bacteria was able to gain access into the bloodstream after the traumatic Zheng manipulation causing gross hematuria. I would switch vancomycin and cefepime to Zosyn which would cover the Enterococcus in the blood and in the urine and possibly the gram-negative jonathon in the urine.   I would also get a transthoracic echocardiogram for completeness, but I think it is less likely that he has endocarditis. He was not having any symptoms until the hematuria occurred.   He will need intravenous antibiotics on discharge.                   ___________________________________________________  ID: Nusrat Toyn MD

## 2020-12-14 NOTE — PROGRESS NOTES
Transition of Care Plan   RUR- 13 % Low Risks    DISPOSITION: Home with home health when ready; pending medical progression    Transport: family       Reviewed chart for transitions of care,and discussed in rounds. Patient is anticipated to go home with home health when ready. Medicare pt has received, reviewed, and signed 2nd IM letter informing them of their right to appeal the discharge. Signed copy has been placed on pt bedside chart.       FAUSTO Lopez

## 2020-12-14 NOTE — PROGRESS NOTES
Problem: Mobility Impaired (Adult and Pediatric)  Goal: *Acute Goals and Plan of Care (Insert Text)  Description: FUNCTIONAL STATUS PRIOR TO ADMISSION: Patient was independent and active without use of DME.    HOME SUPPORT PRIOR TO ADMISSION: The patient lived with wife but did not require assist.    Physical Therapy Goals  Initiated 12/12/2020  1. Patient will move from supine to sit and sit to supine  in bed with modified independence within 7 day(s). 2.  Patient will transfer from bed to chair and chair to bed with modified independence using the least restrictive device within 7 day(s). 3.  Patient will perform sit to stand with modified independence within 7 day(s). 4.  Patient will ambulate with modified independence for 250 feet with the least restrictive device within 7 day(s). 5.  Patient will ascend/descend 4 stairs with 1 handrail(s) with modified independence within 7 day(s). Outcome: Progressing Towards Goal   PHYSICAL THERAPY TREATMENT  Patient: Latoya Easley (10 y.o. male)  Date: 12/14/2020  Diagnosis: Subdural hematoma ([address] [S06.5X9A]  Subdural hematoma ([address] [N63.9H1A]   <principal problem not specified>    4 Days Post-Op  Precautions: Fall  Chart, physical therapy assessment, plan of care and goals were reviewed. ASSESSMENT  Patient continues with skilled PT services and is progressing towards goals. Pt received supine in bed and agreeable to therapy. Pt tolerated session well and making good progress towards goals. Pt able to progress gait distance today with intermittent use of IV pole for support. Noted minor path deviation to the R, but was able to avoid running into any obstacles on that side. Pt was able to complete transfers from the toilet and bed with SBA. Anticipate pt will be able to return home with HHPT upon discharge. .     Current Level of Function Impacting Discharge (mobility/balance): SBA transfers without AD, gait training x 100 ft with CGA    Other factors to consider for discharge: previously independent and active, walks 2 miles /day         PLAN :  Patient continues to benefit from skilled intervention to address the above impairments. Continue treatment per established plan of care. to address goals. Recommendation for discharge: (in order for the patient to meet his/her long term goals)  Physical therapy at least 2 days/week in the home     This discharge recommendation:  Has been made in collaboration with the attending provider and/or case management    IF patient discharges home will need the following DME: none-- discussed with patient may benefit from a cane pending progress       SUBJECTIVE:   Patient stated I walk almost 2 miles a day before I got here    OBJECTIVE DATA SUMMARY:   Critical Behavior:  Neurologic State: Alert, Eyes open spontaneously  Orientation Level: Oriented X4  Cognition: Appropriate decision making, Appropriate safety awareness, Follows commands  Safety/Judgement: Awareness of environment, Insight into deficits  Functional Mobility Training:  Bed Mobility:     Supine to Sit: Supervision     Scooting: Supervision        Transfers:  Sit to Stand: Stand-by assistance  Stand to Sit: Stand-by assistance                             Balance:  Sitting: Intact  Standing: Impaired  Standing - Static: Good  Standing - Dynamic : Good;Fair  Ambulation/Gait Training:  Distance (ft): 100 Feet (ft)  Assistive Device: Gait belt  Ambulation - Level of Assistance: Contact guard assistance        Gait Abnormalities: Decreased step clearance; Path deviations        Base of Support: Narrowed     Speed/Lauren: Pace decreased (<100 feet/min)  Step Length: Right shortened;Left shortened             Therapeutic Exercises:   Gait training with head turns L and R with CGA--no overt LOB or instability noted  Pain Rating:  Pt denied pain    Activity Tolerance:   Good    After treatment patient left in no apparent distress:   Sitting in chair, Call bell within reach, Bed / chair alarm activated, and Side rails x 3    COMMUNICATION/COLLABORATION:   The patients plan of care was discussed with: Occupational therapist and Registered nurse.      Olman Pals, PT, DPT   Time Calculation: 24 mins

## 2020-12-14 NOTE — PROGRESS NOTES
Problem: Falls - Risk of  Goal: *Absence of Falls  Description: Document Cami Jean Fall Risk and appropriate interventions in the flowsheet. Outcome: Progressing Towards Goal  Note: Fall Risk Interventions:  Mobility Interventions: Bed/chair exit alarm, Communicate number of staff needed for ambulation/transfer, PT Consult for mobility concerns, PT Consult for assist device competence    Medication Interventions: Evaluate medications/consider consulting pharmacy, Patient to call before getting OOB, Teach patient to arise slowly    Elimination Interventions: Bed/chair exit alarm, Call light in reach, Toileting schedule/hourly rounds, Urinal in reach     Problem: Hemorrhagic Stroke: Day 5 through Discharge  Goal: Activity/Safety  Outcome: Progressing Towards Goal  Goal: Nutrition/Diet  Outcome: Progressing Towards Goal  Goal: Medications  Outcome: Progressing Towards Goal  Goal: Respiratory  Outcome: Progressing Towards Goal     Problem: Risk for Spread of Infection  Goal: Prevent transmission of infectious organism to others  Description: Prevent the transmission of infectious organisms to other patients, staff members, and visitors.   Outcome: Progressing Towards Goal
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
## 2020-12-14 NOTE — PROGRESS NOTES
Bedside and Verbal shift change report given to LINO Espinoza (oncoming nurse) by Rg Guzman RN (offgoing nurse). Report included the following information SBAR, Kardex, Intake/Output, MAR, Recent Results, Cardiac Rhythm NSR and Quality Measures.

## 2020-12-15 ENCOUNTER — APPOINTMENT (OUTPATIENT)
Dept: NON INVASIVE DIAGNOSTICS | Age: 80
DRG: 025 | End: 2020-12-15
Attending: HOSPITALIST
Payer: MEDICARE

## 2020-12-15 LAB
ANION GAP SERPL CALC-SCNC: 4 MMOL/L (ref 5–15)
BASOPHILS # BLD: 0 K/UL (ref 0–0.1)
BASOPHILS NFR BLD: 1 % (ref 0–1)
BUN SERPL-MCNC: 14 MG/DL (ref 6–20)
BUN/CREAT SERPL: 16 (ref 12–20)
CALCIUM SERPL-MCNC: 8.7 MG/DL (ref 8.5–10.1)
CHLORIDE SERPL-SCNC: 109 MMOL/L (ref 97–108)
CO2 SERPL-SCNC: 29 MMOL/L (ref 21–32)
CREAT SERPL-MCNC: 0.89 MG/DL (ref 0.7–1.3)
DIFFERENTIAL METHOD BLD: ABNORMAL
ECHO AO ROOT DIAM: 3.24 CM
ECHO AV AREA PEAK VELOCITY: 1.76 CM2
ECHO AV AREA/BSA PEAK VELOCITY: 0.9 CM2/M2
ECHO AV PEAK GRADIENT: 10.01 MMHG
ECHO AV PEAK VELOCITY: 158.18 CM/S
ECHO LA AREA 4C: 16.35 CM2
ECHO LA MAJOR AXIS: 4.08 CM
ECHO LA MINOR AXIS: 2 CM
ECHO LA VOL 2C: 37.36 ML (ref 18–58)
ECHO LA VOL 4C: 40.73 ML (ref 18–58)
ECHO LA VOL BP: 43.96 ML (ref 18–58)
ECHO LA VOL/BSA BIPLANE: 21.6 ML/M2 (ref 16–28)
ECHO LA VOLUME INDEX A2C: 18.35 ML/M2 (ref 16–28)
ECHO LA VOLUME INDEX A4C: 20.01 ML/M2 (ref 16–28)
ECHO LV INTERNAL DIMENSION DIASTOLIC: 4.35 CM (ref 4.2–5.9)
ECHO LV INTERNAL DIMENSION SYSTOLIC: 3.28 CM
ECHO LV IVSD: 0.98 CM (ref 0.6–1)
ECHO LV MASS 2D: 154.3 G (ref 88–224)
ECHO LV MASS INDEX 2D: 75.8 G/M2 (ref 49–115)
ECHO LV POSTERIOR WALL DIASTOLIC: 1.11 CM (ref 0.6–1)
ECHO LVOT DIAM: 2.16 CM
ECHO LVOT PEAK GRADIENT: 2.31 MMHG
ECHO LVOT PEAK VELOCITY: 75.98 CM/S
ECHO MV A VELOCITY: 74.25 CM/S
ECHO MV AREA PHT: 3.2 CM2
ECHO MV E DECELERATION TIME (DT): 237.25 MS
ECHO MV E VELOCITY: 63.31 CM/S
ECHO MV E/A RATIO: 0.85
ECHO MV PRESSURE HALF TIME (PHT): 68.8 MS
ECHO PV PEAK INSTANTANEOUS GRADIENT SYSTOLIC: 6.85 MMHG
ECHO RV INTERNAL DIMENSION: 3.67 CM
ECHO RV TAPSE: 2.21 CM (ref 1.5–2)
ECHO TV REGURGITANT MAX VELOCITY: 233.17 CM/S
ECHO TV REGURGITANT PEAK GRADIENT: 21.75 MMHG
EOSINOPHIL # BLD: 0.4 K/UL (ref 0–0.4)
EOSINOPHIL NFR BLD: 6 % (ref 0–7)
ERYTHROCYTE [DISTWIDTH] IN BLOOD BY AUTOMATED COUNT: 13.7 % (ref 11.5–14.5)
GLUCOSE SERPL-MCNC: 102 MG/DL (ref 65–100)
HCT VFR BLD AUTO: 34 % (ref 36.6–50.3)
HGB BLD-MCNC: 11 G/DL (ref 12.1–17)
IMM GRANULOCYTES # BLD AUTO: 0 K/UL (ref 0–0.04)
IMM GRANULOCYTES NFR BLD AUTO: 0 % (ref 0–0.5)
LYMPHOCYTES # BLD: 1.1 K/UL (ref 0.8–3.5)
LYMPHOCYTES NFR BLD: 15 % (ref 12–49)
MCH RBC QN AUTO: 29.4 PG (ref 26–34)
MCHC RBC AUTO-ENTMCNC: 32.4 G/DL (ref 30–36.5)
MCV RBC AUTO: 90.9 FL (ref 80–99)
MONOCYTES # BLD: 0.7 K/UL (ref 0–1)
MONOCYTES NFR BLD: 10 % (ref 5–13)
NEUTS SEG # BLD: 5 K/UL (ref 1.8–8)
NEUTS SEG NFR BLD: 68 % (ref 32–75)
NRBC # BLD: 0 K/UL (ref 0–0.01)
NRBC BLD-RTO: 0 PER 100 WBC
PLATELET # BLD AUTO: 145 K/UL (ref 150–400)
PMV BLD AUTO: 11.4 FL (ref 8.9–12.9)
POTASSIUM SERPL-SCNC: 3.7 MMOL/L (ref 3.5–5.1)
RBC # BLD AUTO: 3.74 M/UL (ref 4.1–5.7)
SODIUM SERPL-SCNC: 142 MMOL/L (ref 136–145)
WBC # BLD AUTO: 7.3 K/UL (ref 4.1–11.1)

## 2020-12-15 PROCEDURE — 74011250636 HC RX REV CODE- 250/636: Performed by: INTERNAL MEDICINE

## 2020-12-15 PROCEDURE — 80048 BASIC METABOLIC PNL TOTAL CA: CPT

## 2020-12-15 PROCEDURE — 93306 TTE W/DOPPLER COMPLETE: CPT

## 2020-12-15 PROCEDURE — 85025 COMPLETE CBC W/AUTO DIFF WBC: CPT

## 2020-12-15 PROCEDURE — 74011000250 HC RX REV CODE- 250: Performed by: NURSE PRACTITIONER

## 2020-12-15 PROCEDURE — 36415 COLL VENOUS BLD VENIPUNCTURE: CPT

## 2020-12-15 PROCEDURE — 74011000258 HC RX REV CODE- 258: Performed by: INTERNAL MEDICINE

## 2020-12-15 PROCEDURE — 65660000000 HC RM CCU STEPDOWN

## 2020-12-15 PROCEDURE — 94664 DEMO&/EVAL PT USE INHALER: CPT

## 2020-12-15 PROCEDURE — 77010033678 HC OXYGEN DAILY

## 2020-12-15 PROCEDURE — 74011250636 HC RX REV CODE- 250/636: Performed by: NURSE PRACTITIONER

## 2020-12-15 PROCEDURE — 97116 GAIT TRAINING THERAPY: CPT

## 2020-12-15 PROCEDURE — 94640 AIRWAY INHALATION TREATMENT: CPT

## 2020-12-15 PROCEDURE — 74011250637 HC RX REV CODE- 250/637: Performed by: INTERNAL MEDICINE

## 2020-12-15 PROCEDURE — 74011250637 HC RX REV CODE- 250/637: Performed by: NURSE PRACTITIONER

## 2020-12-15 RX ADMIN — LEVETIRACETAM 500 MG: 500 TABLET ORAL at 21:27

## 2020-12-15 RX ADMIN — CETIRIZINE HYDROCHLORIDE 10 MG: 10 TABLET, FILM COATED ORAL at 09:24

## 2020-12-15 RX ADMIN — HYDRALAZINE HYDROCHLORIDE 10 MG: 20 INJECTION INTRAMUSCULAR; INTRAVENOUS at 23:00

## 2020-12-15 RX ADMIN — HYDRALAZINE HYDROCHLORIDE 10 MG: 20 INJECTION INTRAMUSCULAR; INTRAVENOUS at 06:02

## 2020-12-15 RX ADMIN — HYDRALAZINE HYDROCHLORIDE 10 MG: 20 INJECTION INTRAMUSCULAR; INTRAVENOUS at 18:08

## 2020-12-15 RX ADMIN — BUDESONIDE 500 MCG: 0.5 INHALANT RESPIRATORY (INHALATION) at 20:11

## 2020-12-15 RX ADMIN — PANTOPRAZOLE SODIUM 40 MG: 40 TABLET, DELAYED RELEASE ORAL at 06:02

## 2020-12-15 RX ADMIN — PIPERACILLIN AND TAZOBACTAM 3.38 G: 3; .375 INJECTION, POWDER, LYOPHILIZED, FOR SOLUTION INTRAVENOUS at 14:19

## 2020-12-15 RX ADMIN — PIPERACILLIN AND TAZOBACTAM 3.38 G: 3; .375 INJECTION, POWDER, LYOPHILIZED, FOR SOLUTION INTRAVENOUS at 06:02

## 2020-12-15 RX ADMIN — FINASTERIDE 5 MG: 5 TABLET, FILM COATED ORAL at 21:27

## 2020-12-15 RX ADMIN — PIPERACILLIN AND TAZOBACTAM 3.38 G: 3; .375 INJECTION, POWDER, LYOPHILIZED, FOR SOLUTION INTRAVENOUS at 21:30

## 2020-12-15 RX ADMIN — LEVETIRACETAM 500 MG: 500 TABLET ORAL at 09:24

## 2020-12-15 RX ADMIN — TAMSULOSIN HYDROCHLORIDE 0.4 MG: 0.4 CAPSULE ORAL at 21:27

## 2020-12-15 RX ADMIN — DOCUSATE SODIUM 100 MG: 100 CAPSULE, LIQUID FILLED ORAL at 09:24

## 2020-12-15 NOTE — PROGRESS NOTES
Bedside and Verbal shift change report given to Emilie Kuo RN (oncoming nurse) by Radha Rawls (offgoing nurse). Report included the following information SBAR, Kardex, MAR, Cardiac Rhythm NSR and Dual Neuro Assessment.
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
## 2020-12-15 NOTE — PROGRESS NOTES
Transition of Care Plan   RUR- 10 % Low Risks    DISPOSITION: Home with home health PT and RN for iv abx; pending medical progression    Transport: family- Yair Blanco [phone]        Reviewed chart for transitions of care,and discussed in rounds. CM met with patient at bedside to explain role and offer support. Patient is alert and oriented x4, and confirmed demographics. Transition of Care Plan:     The Plan for Transition of Care is related to the following treatment goals: home health & infusion     The Patient and/or patient representative daughter Yair Blanco [phone] was provided with a choice of provider and agrees  with the discharge plan. Yes [x] No []    A Freedom of choice list was provided with basic dialogue that supports the patient's individualized plan of care/goals and shares the quality data associated with the providers. Yes [x] No []      A referral has been made to [facility] [phone] via all scripts. Called and spoke with , she will call me back with staffing confirmation. If staffing can be done then they will accept. A referral has been made to [facility] for home infusion. A iv abx order needs to be uploaded on all scripts once available. 4:15 PM: Received a call from patient's daughter Yair Blanco [phone]. She requested to change home health agency, [facility] now; made a referral via all scripts- accepted.      FAUSTO Doyle

## 2020-12-15 NOTE — PROGRESS NOTES
Problem: Mobility Impaired (Adult and Pediatric)  Goal: *Acute Goals and Plan of Care (Insert Text)  Description: FUNCTIONAL STATUS PRIOR TO ADMISSION: Patient was independent and active without use of DME.    HOME SUPPORT PRIOR TO ADMISSION: The patient lived with wife but did not require assist.    Physical Therapy Goals  Initiated 12/12/2020  1. Patient will move from supine to sit and sit to supine  in bed with modified independence within 7 day(s). 2.  Patient will transfer from bed to chair and chair to bed with modified independence using the least restrictive device within 7 day(s). 3.  Patient will perform sit to stand with modified independence within 7 day(s). 4.  Patient will ambulate with modified independence for 250 feet with the least restrictive device within 7 day(s). 5.  Patient will ascend/descend 4 stairs with 1 handrail(s) with modified independence within 7 day(s). Outcome: Progressing Towards Goal   PHYSICAL THERAPY TREATMENT  Patient: Cheryl Thompson (89 y.o. male)  Date: 12/15/2020  Diagnosis: Subdural hematoma ([facility] [address] [S06.5X9A]  Subdural hematoma ([address] [Z61.9N3T]   <principal problem not specified>    5 Days Post-Op  Precautions: Fall  Chart, physical therapy assessment, plan of care and goals were reviewed. ASSESSMENT  Patient continues with skilled PT services and is progressing towards goals. Pt received seated in the chair and agreeable to therapy. Pt tolerated session well and making good progress towards goals. Pt tolerated increased gait distance today x 350 ft without AD or use of IV pole for support. Pt able to complete head turns during gait training without LOB or instability. Pt remained seated in the chair with all needs met. Continue to recommend HHPT upon discharge. .     Current Level of Function Impacting Discharge (mobility/balance): SBA transfers without AD, gait training x 350 ft without AD    Other factors to consider for discharge: previously independent, active         PLAN :  Patient continues to benefit from skilled intervention to address the above impairments. Continue treatment per established plan of care. to address goals. Recommendation for discharge: (in order for the patient to meet his/her long term goals)  Physical therapy at least 2 days/week in the home     This discharge recommendation:  Has been made in collaboration with the attending provider and/or case management    IF patient discharges home will need the following DME: none       SUBJECTIVE:   Patient stated I'm feeling pretty good. Just waiting to get out of here.     OBJECTIVE DATA SUMMARY:   Critical Behavior:  Neurologic State: Alert  Orientation Level: Oriented X4  Cognition: Follows commands  Safety/Judgement: Awareness of environment, Insight into deficits  Functional Mobility Training:  Bed Mobility:                    Transfers:  Sit to Stand: Stand-by assistance  Stand to Sit: Stand-by assistance                             Balance:  Sitting: Intact  Standing: Intact  Ambulation/Gait Training:  Distance (ft): 350 Feet (ft)  Assistive Device: Gait belt  Ambulation - Level of Assistance: Contact guard assistance;Stand-by assistance        Gait Abnormalities: Decreased step clearance        Base of Support: Narrowed     Speed/Lauren: Pace decreased (<100 feet/min)  Step Length: Right shortened;Left shortened     Pain Rating:  Pt denied pain    Activity Tolerance:   Good    After treatment patient left in no apparent distress:   Sitting in chair, Call bell within reach, and Bed / chair alarm activated    COMMUNICATION/COLLABORATION:   The patients plan of care was discussed with: Occupational therapist and Registered nurse.      Kamini Quintanilla, PT, DPT   Time Calculation: 10 mins

## 2020-12-15 NOTE — PROGRESS NOTES
Problem: Falls - Risk of  Goal: *Absence of Falls  Description: Document Kg Kevin Fall Risk and appropriate interventions in the flowsheet. Outcome: Progressing Towards Goal  Note: Fall Risk Interventions:  Mobility Interventions: Bed/chair exit alarm, Communicate number of staff needed for ambulation/transfer, PT Consult for mobility concerns, PT Consult for assist device competence         Medication Interventions: Evaluate medications/consider consulting pharmacy, Patient to call before getting OOB, Teach patient to arise slowly    Elimination Interventions: Bed/chair exit alarm, Call light in reach, Toileting schedule/hourly rounds, Urinal in reach              Problem: Hemorrhagic Stroke: Day 5 through Discharge  Goal: Activity/Safety  Outcome: Progressing Towards Goal  Goal: Diagnostic Test/Procedures  Outcome: Progressing Towards Goal  Goal: Nutrition/Diet  Outcome: Progressing Towards Goal  Goal: Medications  Outcome: Progressing Towards Goal     Problem: Risk for Spread of Infection  Goal: Prevent transmission of infectious organism to others  Description: Prevent the transmission of infectious organisms to other patients, staff members, and visitors.   Outcome: Progressing Towards Goal
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
## 2020-12-15 NOTE — PROGRESS NOTES
NIS Brief Progress Note:    Received CDMP inquiry -- Enterococcal bacteremia is related to traumatic insertion of parker catheter. See ID note for further clarification.      Pina Martinez NP  Neurocritical Care Nurse Practitioner  [phone]

## 2020-12-16 ENCOUNTER — TELEPHONE (OUTPATIENT)
Dept: NEUROSURGERY | Age: 80
End: 2020-12-16

## 2020-12-16 LAB
ANION GAP SERPL CALC-SCNC: 5 MMOL/L (ref 5–15)
BACTERIA SPEC CULT: ABNORMAL
BACTERIA SPEC CULT: ABNORMAL
BASOPHILS # BLD: 0.1 K/UL (ref 0–0.1)
BASOPHILS # BLD: NORMAL K/UL (ref 0–0.1)
BASOPHILS NFR BLD: 1 % (ref 0–1)
BASOPHILS NFR BLD: NORMAL % (ref 0–1)
BUN SERPL-MCNC: 14 MG/DL (ref 6–20)
BUN/CREAT SERPL: 16 (ref 12–20)
CALCIUM SERPL-MCNC: 8.8 MG/DL (ref 8.5–10.1)
CC UR VC: ABNORMAL
CHLORIDE SERPL-SCNC: 110 MMOL/L (ref 97–108)
CO2 SERPL-SCNC: 25 MMOL/L (ref 21–32)
CREAT SERPL-MCNC: 0.85 MG/DL (ref 0.7–1.3)
DIFFERENTIAL METHOD BLD: ABNORMAL
DIFFERENTIAL METHOD BLD: NORMAL
EOSINOPHIL # BLD: 0.1 K/UL (ref 0–0.4)
EOSINOPHIL # BLD: NORMAL K/UL (ref 0–0.4)
EOSINOPHIL NFR BLD: 2 % (ref 0–7)
EOSINOPHIL NFR BLD: NORMAL % (ref 0–7)
ERYTHROCYTE [DISTWIDTH] IN BLOOD BY AUTOMATED COUNT: 14.2 % (ref 11.5–14.5)
ERYTHROCYTE [DISTWIDTH] IN BLOOD BY AUTOMATED COUNT: NORMAL % (ref 11.5–14.5)
GLUCOSE SERPL-MCNC: 106 MG/DL (ref 65–100)
HCT VFR BLD AUTO: 42.3 % (ref 36.6–50.3)
HCT VFR BLD AUTO: NORMAL % (ref 36.6–50.3)
HGB BLD-MCNC: 12.7 G/DL (ref 12.1–17)
HGB BLD-MCNC: NORMAL G/DL (ref 12.1–17)
IMM GRANULOCYTES # BLD AUTO: 0.1 K/UL (ref 0–0.04)
IMM GRANULOCYTES # BLD AUTO: NORMAL K/UL (ref 0–0.04)
IMM GRANULOCYTES NFR BLD AUTO: 2 % (ref 0–0.5)
IMM GRANULOCYTES NFR BLD AUTO: NORMAL % (ref 0–0.5)
LYMPHOCYTES # BLD: 1.2 K/UL (ref 0.8–3.5)
LYMPHOCYTES # BLD: NORMAL K/UL (ref 0.8–3.5)
LYMPHOCYTES NFR BLD: 19 % (ref 12–49)
LYMPHOCYTES NFR BLD: NORMAL % (ref 12–49)
MCH RBC QN AUTO: 28.7 PG (ref 26–34)
MCH RBC QN AUTO: NORMAL PG (ref 26–34)
MCHC RBC AUTO-ENTMCNC: 30 G/DL (ref 30–36.5)
MCHC RBC AUTO-ENTMCNC: NORMAL G/DL (ref 30–36.5)
MCV RBC AUTO: 95.7 FL (ref 80–99)
MCV RBC AUTO: NORMAL FL (ref 80–99)
MONOCYTES # BLD: 0.7 K/UL (ref 0–1)
MONOCYTES # BLD: NORMAL K/UL (ref 0–1)
MONOCYTES NFR BLD: 11 % (ref 5–13)
MONOCYTES NFR BLD: NORMAL % (ref 5–13)
NEUTS SEG # BLD: 4.1 K/UL (ref 1.8–8)
NEUTS SEG # BLD: NORMAL K/UL (ref 1.8–8)
NEUTS SEG NFR BLD: 65 % (ref 32–75)
NEUTS SEG NFR BLD: NORMAL % (ref 32–75)
NRBC # BLD: 0 K/UL (ref 0–0.01)
NRBC # BLD: NORMAL K/UL (ref 0–0.01)
NRBC BLD-RTO: 0 PER 100 WBC
NRBC BLD-RTO: NORMAL PER 100 WBC
PLATELET # BLD AUTO: 166 K/UL (ref 150–400)
PLATELET # BLD AUTO: NORMAL K/UL (ref 150–400)
PMV BLD AUTO: 11.3 FL (ref 8.9–12.9)
POTASSIUM SERPL-SCNC: 3.8 MMOL/L (ref 3.5–5.1)
RBC # BLD AUTO: 4.42 M/UL (ref 4.1–5.7)
RBC # BLD AUTO: NORMAL M/UL (ref 4.1–5.7)
RBC MORPH BLD: NORMAL
RBC MORPH BLD: NORMAL
SERVICE CMNT-IMP: ABNORMAL
SODIUM SERPL-SCNC: 140 MMOL/L (ref 136–145)
WBC # BLD AUTO: 6.4 K/UL (ref 4.1–11.1)
WBC # BLD AUTO: NORMAL K/UL (ref 4.1–11.1)

## 2020-12-16 PROCEDURE — 36573 INSJ PICC RS&I 5 YR+: CPT | Performed by: HOSPITALIST

## 2020-12-16 PROCEDURE — 97116 GAIT TRAINING THERAPY: CPT

## 2020-12-16 PROCEDURE — 65660000000 HC RM CCU STEPDOWN

## 2020-12-16 PROCEDURE — 74011000258 HC RX REV CODE- 258: Performed by: INTERNAL MEDICINE

## 2020-12-16 PROCEDURE — 80048 BASIC METABOLIC PNL TOTAL CA: CPT

## 2020-12-16 PROCEDURE — 74011250637 HC RX REV CODE- 250/637: Performed by: NURSE PRACTITIONER

## 2020-12-16 PROCEDURE — 74011250637 HC RX REV CODE- 250/637: Performed by: INTERNAL MEDICINE

## 2020-12-16 PROCEDURE — 74011250636 HC RX REV CODE- 250/636: Performed by: INTERNAL MEDICINE

## 2020-12-16 PROCEDURE — 36415 COLL VENOUS BLD VENIPUNCTURE: CPT

## 2020-12-16 PROCEDURE — 97535 SELF CARE MNGMENT TRAINING: CPT

## 2020-12-16 PROCEDURE — 05HB33Z INSERTION OF INFUSION DEVICE INTO RIGHT BASILIC VEIN, PERCUTANEOUS APPROACH: ICD-10-PCS | Performed by: RADIOLOGY

## 2020-12-16 PROCEDURE — 76937 US GUIDE VASCULAR ACCESS: CPT

## 2020-12-16 PROCEDURE — 85025 COMPLETE CBC W/AUTO DIFF WBC: CPT

## 2020-12-16 PROCEDURE — C1751 CATH, INF, PER/CENT/MIDLINE: HCPCS

## 2020-12-16 PROCEDURE — 77030020365 HC SOL INJ SOD CL 0.9% 50ML

## 2020-12-16 RX ADMIN — PIPERACILLIN AND TAZOBACTAM 3.38 G: 3; .375 INJECTION, POWDER, LYOPHILIZED, FOR SOLUTION INTRAVENOUS at 06:00

## 2020-12-16 RX ADMIN — FINASTERIDE 5 MG: 5 TABLET, FILM COATED ORAL at 21:16

## 2020-12-16 RX ADMIN — FLUTICASONE PROPIONATE 2 SPRAY: 50 SPRAY, METERED NASAL at 09:00

## 2020-12-16 RX ADMIN — TAMSULOSIN HYDROCHLORIDE 0.4 MG: 0.4 CAPSULE ORAL at 21:16

## 2020-12-16 RX ADMIN — DOCUSATE SODIUM 100 MG: 100 CAPSULE, LIQUID FILLED ORAL at 09:17

## 2020-12-16 RX ADMIN — PIPERACILLIN AND TAZOBACTAM 3.38 G: 3; .375 INJECTION, POWDER, LYOPHILIZED, FOR SOLUTION INTRAVENOUS at 14:14

## 2020-12-16 RX ADMIN — CETIRIZINE HYDROCHLORIDE 10 MG: 10 TABLET, FILM COATED ORAL at 09:17

## 2020-12-16 RX ADMIN — LEVETIRACETAM 500 MG: 500 TABLET ORAL at 21:16

## 2020-12-16 RX ADMIN — PIPERACILLIN AND TAZOBACTAM 3.38 G: 3; .375 INJECTION, POWDER, LYOPHILIZED, FOR SOLUTION INTRAVENOUS at 21:16

## 2020-12-16 RX ADMIN — PANTOPRAZOLE SODIUM 40 MG: 40 TABLET, DELAYED RELEASE ORAL at 06:49

## 2020-12-16 RX ADMIN — LEVETIRACETAM 500 MG: 500 TABLET ORAL at 09:17

## 2020-12-16 NOTE — PROGRESS NOTES
Problem: Mobility Impaired (Adult and Pediatric)  Goal: *Acute Goals and Plan of Care (Insert Text)  Description: FUNCTIONAL STATUS PRIOR TO ADMISSION: Patient was independent and active without use of DME.    HOME SUPPORT PRIOR TO ADMISSION: The patient lived with wife but did not require assist.    Physical Therapy Goals  Initiated 12/12/2020  1. Patient will move from supine to sit and sit to supine  in bed with modified independence within 7 day(s). 2.  Patient will transfer from bed to chair and chair to bed with modified independence using the least restrictive device within 7 day(s). 3.  Patient will perform sit to stand with modified independence within 7 day(s). 4.  Patient will ambulate with modified independence for 250 feet with the least restrictive device within 7 day(s). 5.  Patient will ascend/descend 4 stairs with 1 handrail(s) with modified independence within 7 day(s). Outcome: Progressing Towards Goal   PHYSICAL THERAPY TREATMENT  Patient: Jerry Loya (44 y.o. male)  Date: 12/16/2020  Diagnosis: Subdural hematoma ([address] [S06.5X9A]  Subdural hematoma ([address] [Q92.8U6B] <principal problem not specified>    6 Days Post-Op  Precautions: Fall  Chart, physical therapy assessment, plan of care and goals were reviewed. ASSESSMENT  Patient continues with skilled PT services and is progressing towards goals. Patient moving supine to sit with modified independence. Able to ambulate 350 feet with stand by assist. Able to move head to left and right with ambulation without loss of balance. Able to stop immediately on  command without loss of balance. Patient able to negotiate 4 steps with 1 rail with CGA. Current Level of Function Impacting Discharge (mobility/balance): SBA to CGA    Other factors to consider for discharge:          PLAN :  Patient continues to benefit from skilled intervention to address the above impairments. Continue treatment per established plan of care.   to address goals. Recommendation for discharge: (in order for the patient to meet his/her long term goals)  Physical therapy at least 2 days/week in the home     This discharge recommendation:  A follow-up discussion with the attending provider and/or case management is planned    IF patient discharges home will need the following DME:none       SUBJECTIVE:   Patient stated I just take my time.     OBJECTIVE DATA SUMMARY:   Critical Behavior:  Neurologic State: Alert  Orientation Level: Oriented X4  Cognition: Follows commands  Safety/Judgement: Awareness of environment, Insight into deficits  Functional Mobility Training:  Bed Mobility:   Supine to sit: Modified independence      Transfers:  Sit to Stand: Supervision  Stand to Sit: Supervision      Balance:  Sitting: Intact  Standing: Intact  Standing - Static: Good  Standing - Dynamic : Good  Ambulation/Gait Training:  Distance (ft): 350 Feet (ft)  Assistive Device: Gait belt  Ambulation - Level of Assistance: Stand-by assistance   Gait Abnormalities: Decreased step clearance   Base of Support: Narrowed   Speed/Lauren: Slow  Step Length: Left shortened;Right shortened   Stairs:  Number of Stairs Trained: 4  Stairs - Level of Assistance: Contact guard assistance   Rail Use: Right     Pain Rating:  No complaint    Activity Tolerance:   Good    After treatment patient left in no apparent distress:   Sitting in chair, Call bell within reach, and Bed / chair alarm activated    COMMUNICATION/COLLABORATION:   The patients plan of care was discussed with: Registered nurse.      Nigel Alva, PARKER   Time Calculation: 17 mins

## 2020-12-16 NOTE — PROGRESS NOTES
Bedside and Verbal shift change report given to Jessica Hardy RN (oncoming nurse) by [facility], RN (offgoing nurse). Report included the following information SBAR, Kardex, MAR, Recent Results, Cardiac Rhythm SR and Dual Neuro Assessment.

## 2020-12-16 NOTE — PROGRESS NOTES
Problem: Hemorrhagic Stroke: Day 5 through Discharge  Goal: Activity/Safety  Outcome: Progressing Towards Goal  Goal: Consults, if ordered  Outcome: Progressing Towards Goal  Goal: Diagnostic Test/Procedures  Outcome: Progressing Towards Goal  Goal: Nutrition/Diet  Outcome: Progressing Towards Goal  Goal: Medications  Outcome: Progressing Towards Goal  Goal: Respiratory  Outcome: Progressing Towards Goal  Goal: Treatments/Interventions/Procedures  Outcome: Progressing Towards Goal  Goal: Psychosocial  Outcome: Progressing Towards Goal
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
## 2020-12-16 NOTE — PROCEDURES
PICC Placement Note    PRE-PROCEDURE VERIFICATION  Correct Procedure: yes  Correct Site:  yes  Temperature: Temp: 97.4 °F (36.3 °C), Temperature Source: Temp Source: Oral  Recent Labs     12/16/20  1114 12/16/20  0137   BUN  --  14   CREA  --  0.85    PLEASE DISREGARD RESULTS   WBC 6.4 PLEASE DISREGARD RESULTS     Allergies: Melon flavor  Education materials, including PICC Booklet, for PICC Care given to patient: yes. See Patient Education activity for further details. PROCEDURE DETAIL  A double lumen PICC line was started for antibiotic therapy. The following documentation is in addition to the PICC properties in the lines/airways flowsheet :  Lot #: [id number]  Was xylocaine 1% used intradermally:  yes  Catheter Length: 39 (cm)  External Length: 1 (cm)  Arm Circumference: 30 (cm)  Vein Selection for PICC:right basilic  Central Line Bundle followed yes  Complication Related to Insertion: none      The placement was verified by [employer] technology: The  tip location is in the superior vena cava. See ECG results for PICC tip placement. Report given to nurse Roma Osgood is okay to use.     Edin Martin, RN

## 2020-12-16 NOTE — PROGRESS NOTES
Bedside and Verbal shift change report given to [address], RN (oncoming nurse) by Debo Early RN (offgoing nurse). Report included the following information SBAR, Kardex, MAR, Cardiac Rhythm NSR and Dual Neuro Assessment   .

## 2020-12-16 NOTE — PROGRESS NOTES
Problem: Self Care Deficits Care Plan (Adult)  Goal: *Acute Goals and Plan of Care (Insert Text)  Description:   FUNCTIONAL STATUS PRIOR TO ADMISSION: Patient was independent with ADLs, IADLs, and functional mobility/ ambulatory without AD. Doing yard work and [employer] restoring 2 [employer] cars. HOME SUPPORT: Patient lived with wife but did not require assistance    Occupational Therapy Goals  Initiated 12/13/2020  1. Patient will perform grooming standing at sink with modified independence within 7 day(s). 2.  Patient will perform lower body dressing with modified independence within 7 day(s). 3.  Patient will perform bathing with modified independence within 7 day(s). 4.  Patient will perform toilet transfers with modified independence within 7 day(s). 5.  Patient will perform all aspects of toileting with modified independence within 7 day(s). 6.  Patient will utilize fall prevention techniques during functional activities with verbal cues within 7 day(s). Outcome: Progressing Towards Goal     OCCUPATIONAL THERAPY TREATMENT/DISCHARGE  Patient: Jerry Loya (26 y.o. male)  Date: 12/16/2020  Diagnosis: Subdural hematoma ([facility] [address] [S06.5X9A]  Subdural hematoma ([address] [G77.8M1T] <principal problem not specified>    6 Days Post-Op  Precautions: Fall  Chart, occupational therapy assessment, plan of care, and goals were reviewed. ASSESSMENT  Patient continues with skilled OT services and has progressed towards goals at supervision level. Noted patient's plan is to discharge home with IV abx. In today's session, he was receptive to instruction on line safety, IV pole positioning, and safely integrating IV pole in mobility tasks. He performed the following with supervision: sit-stand, ambulated throughout room through confined spaces and turns and then ambulated ~300 feet. Patient has no focal neuro deficits or ADL concerns.   Patient was receptive to review of fall prevention as well as BEFAST signs/ symptoms of CVA. Recommend d/c home when medically stable with no additional OT needs. Current Level of Function (ADLs/self-care): supervision         PLAN :  Rationale for discharge: Goals achieved at supervision level  Recommendation for discharge: (in order for the patient to meet his/her long term goals)  No skilled occupational therapy/ follow up rehabilitation needs identified at this time. This discharge recommendation:  Has been made in collaboration with the attending provider and/or case management       SUBJECTIVE:   Patient stated I'm getting steadier.     OBJECTIVE DATA SUMMARY:   Cognitive/Behavioral Status:  Neurologic State: Alert;Eyes open spontaneously  Orientation Level: Oriented X4  Cognition: Follows commands; Appropriate decision making; Appropriate for age attention/concentration; Appropriate safety awareness             Functional Mobility and Transfers for ADLs:  Bed Mobility:   Supine<>sit: Supervision    Transfers:  Sit to Stand: Supervision          Balance:  Sitting: Intact  Standing: Intact  Standing - Static: Good  Standing - Dynamic : Good    Pain:  Patient did not report pain    Activity Tolerance:   VSS, good    After treatment patient left in no apparent distress:   Supine in bed, Call bell within reach, and Bed / chair alarm activated    COMMUNICATION/COLLABORATION:   The patients plan of care was discussed with: Physical therapist and Registered nurse.      Armando Mckeon OT  Time Calculation: 24 mins

## 2020-12-16 NOTE — PROGRESS NOTES
Problem: Hemorrhagic Stroke: Day 5 through Discharge  Goal: Diagnostic Test/Procedures  Outcome: Progressing Towards Goal  Goal: Nutrition/Diet  Outcome: Progressing Towards Goal  Goal: Medications  Outcome: Progressing Towards Goal  Goal: Respiratory  Outcome: Progressing Towards Goal  Goal: Treatments/Interventions/Procedures  Outcome: Progressing Towards Goal

## 2020-12-16 NOTE — PROGRESS NOTES
The documentation for this period (8869a - 7497a is being entered following the guidelines as defined in the [address] [place] [address] downtime policy by Sean Domingo.

## 2020-12-17 VITALS
TEMPERATURE: 97.9 F | SYSTOLIC BLOOD PRESSURE: 149 MMHG | DIASTOLIC BLOOD PRESSURE: 85 MMHG | HEART RATE: 82 BPM | WEIGHT: 180.3 LBS | BODY MASS INDEX: 25.24 KG/M2 | RESPIRATION RATE: 17 BRPM | HEIGHT: 71 IN | OXYGEN SATURATION: 94 %

## 2020-12-17 DIAGNOSIS — S06.5XAA SUBDURAL HEMATOMA: Primary | ICD-10-CM

## 2020-12-17 LAB
ANION GAP SERPL CALC-SCNC: 5 MMOL/L (ref 5–15)
BASOPHILS # BLD: 0 K/UL (ref 0–0.1)
BASOPHILS NFR BLD: 1 % (ref 0–1)
BUN SERPL-MCNC: 13 MG/DL (ref 6–20)
BUN/CREAT SERPL: 16 (ref 12–20)
CALCIUM SERPL-MCNC: 8.3 MG/DL (ref 8.5–10.1)
CHLORIDE SERPL-SCNC: 108 MMOL/L (ref 97–108)
CO2 SERPL-SCNC: 27 MMOL/L (ref 21–32)
CREAT SERPL-MCNC: 0.81 MG/DL (ref 0.7–1.3)
DIFFERENTIAL METHOD BLD: ABNORMAL
EOSINOPHIL # BLD: 0.3 K/UL (ref 0–0.4)
EOSINOPHIL NFR BLD: 4 % (ref 0–7)
ERYTHROCYTE [DISTWIDTH] IN BLOOD BY AUTOMATED COUNT: 14.1 % (ref 11.5–14.5)
GLUCOSE SERPL-MCNC: 99 MG/DL (ref 65–100)
HCT VFR BLD AUTO: 34.4 % (ref 36.6–50.3)
HGB BLD-MCNC: 10.9 G/DL (ref 12.1–17)
IMM GRANULOCYTES # BLD AUTO: 0.1 K/UL (ref 0–0.04)
IMM GRANULOCYTES NFR BLD AUTO: 2 % (ref 0–0.5)
LYMPHOCYTES # BLD: 1.4 K/UL (ref 0.8–3.5)
LYMPHOCYTES NFR BLD: 19 % (ref 12–49)
MCH RBC QN AUTO: 28.5 PG (ref 26–34)
MCHC RBC AUTO-ENTMCNC: 31.7 G/DL (ref 30–36.5)
MCV RBC AUTO: 90.1 FL (ref 80–99)
MONOCYTES # BLD: 0.8 K/UL (ref 0–1)
MONOCYTES NFR BLD: 11 % (ref 5–13)
NEUTS SEG # BLD: 4.9 K/UL (ref 1.8–8)
NEUTS SEG NFR BLD: 65 % (ref 32–75)
NRBC # BLD: 0 K/UL (ref 0–0.01)
NRBC BLD-RTO: 0 PER 100 WBC
PLATELET # BLD AUTO: 202 K/UL (ref 150–400)
PMV BLD AUTO: 11.2 FL (ref 8.9–12.9)
POTASSIUM SERPL-SCNC: 3.7 MMOL/L (ref 3.5–5.1)
RBC # BLD AUTO: 3.82 M/UL (ref 4.1–5.7)
SODIUM SERPL-SCNC: 140 MMOL/L (ref 136–145)
WBC # BLD AUTO: 7.5 K/UL (ref 4.1–11.1)

## 2020-12-17 PROCEDURE — 74011250637 HC RX REV CODE- 250/637: Performed by: NURSE PRACTITIONER

## 2020-12-17 PROCEDURE — 74011250637 HC RX REV CODE- 250/637: Performed by: INTERNAL MEDICINE

## 2020-12-17 PROCEDURE — 74011000258 HC RX REV CODE- 258: Performed by: INTERNAL MEDICINE

## 2020-12-17 PROCEDURE — 74011250636 HC RX REV CODE- 250/636: Performed by: INTERNAL MEDICINE

## 2020-12-17 PROCEDURE — 85025 COMPLETE CBC W/AUTO DIFF WBC: CPT

## 2020-12-17 PROCEDURE — 80048 BASIC METABOLIC PNL TOTAL CA: CPT

## 2020-12-17 PROCEDURE — 36415 COLL VENOUS BLD VENIPUNCTURE: CPT

## 2020-12-17 RX ADMIN — PIPERACILLIN AND TAZOBACTAM 3.38 G: 3; .375 INJECTION, POWDER, LYOPHILIZED, FOR SOLUTION INTRAVENOUS at 05:44

## 2020-12-17 RX ADMIN — CETIRIZINE HYDROCHLORIDE 10 MG: 10 TABLET, FILM COATED ORAL at 08:54

## 2020-12-17 RX ADMIN — DOCUSATE SODIUM 100 MG: 100 CAPSULE, LIQUID FILLED ORAL at 08:54

## 2020-12-17 RX ADMIN — LEVETIRACETAM 500 MG: 500 TABLET ORAL at 08:54

## 2020-12-17 RX ADMIN — PANTOPRAZOLE SODIUM 40 MG: 40 TABLET, DELAYED RELEASE ORAL at 07:43

## 2020-12-17 NOTE — PROGRESS NOTES
Bedside and Verbal shift change report given to [address] [place] [address] (oncoming nurse) by Jacque Dominguez (offgoing nurse). Report included the following information SBAR, Kardex, MAR, Cardiac Rhythm NSR, Quality Measures and Dual Neuro Assessment.

## 2020-12-17 NOTE — DISCHARGE INSTRUCTIONS
Patient Education     PLAN:  Discharge home with home health. He will receive antibiotic infusions per Dr. Bennett Galvan (Infectious Disease Specialist) by [facility] and [facility] will follow for home health    FOLLOW UP APPOINTMENTS:   1. Follow up in the [facility] clinic in 6 weeks with Dr Hilario Lala. Obtain CT head prior to this appointment. 2   Please make an appointment to follow up with PCP and Dr. Bennett Galvan (Infectious Disease Specialist)    ACTIVITY:   Do not lift anything over 10 lbs for two weeks from the date of your procedure. Try to limit going up and down stairs as much as possible. Rest and hydrate. May resume all physical activities as tolerated after 2 weeks. WOUND CARE:   Montior for signs and sx of infection including fever, expanding inflammation and discolored drainage. Report any changes in temperature in affected extremity, numbness, weakness or hematoma. If you experience any increased bruising or bleeding at your groin puncture site either outside or under the skin please apply direct, firm pressure for 20 minutes. Keep track of the bruising at the groin site by marking it with a pen or marker. If the bruising continues to be dark purple or blue in color ,OR is expanding , please call our office to let the on call doctor know. We have someone on call 24/7. OTHER RECOMMENDATIONS:  BEFAST reviewed to educate for stroke symptoms. Please call 911 and proceed to emergency department immediately if you develop any of the following:  - Acute changes in your balance or vision  - Weakness in your face, arm or leg   - Speech changes or difficulties. DISCHARGE MEDICATIONS:   1. Resume all home meds  2. Continue antibiotics per Home Health and ID      Subdural Hematoma: Care Instructions  Your Care Instructions  A subdural hematoma is a buildup of blood between the layers of tissue that cover the brain. The blood collects under the layer closest to the skull.  (This layer is called the dura.) The bleeding is most often caused by a head injury, but there can be other causes. In an older adult, even a minor injury can lead to a subdural hematoma. The buildup of blood inside the skull can put pressure on the brain. This may cause symptoms, such as a severe headache, confusion, or seizures. There are two kinds of hematomas: acute and chronic. · With an acute hematoma, symptoms start soon after the injury. · With a chronic hematoma, it may be days or weeks before symptoms appear. Doctors use imaging tests to find the buildup of blood. You may have a test such as a CT scan or MRI. The doctor may also do a test to check the pressure inside your skull. Bleeding inside the skull may get worse over time. So it is very important to pay attention to your symptoms. And be sure to see your doctor for follow-up testing. In some cases, treatment is needed to remove the blood. This helps relieve the pressure on the brain. Your doctor may make one or two small holes in your skull. For a large hematoma, the doctor may need to remove a piece of the skull. You may not need treatment if you have a small hematoma that is not causing symptoms. If you take aspirin or some other blood thinner, you may need to stop taking it. The doctor may give you treatment to undo the effects of the blood thinner. This can help prevent more bleeding in the skull. The doctor has checked you carefully, but problems can develop later. If you notice any problems or new symptoms, get medical treatment right away. Follow-up care is a key part of your treatment and safety. Be sure to make and go to all appointments, and call your doctor if you are having problems. It's also a good idea to know your test results and keep a list of the medicines you take. How can you care for yourself at home? For an acute hematoma  · Follow your doctor's instructions.  He or she will tell you if you need someone to watch you closely for the next 24 hours or longer. For a chronic hematoma  · Get plenty of sleep at night, and take it easy during the day. Rest is the best way to recover. · Avoid activities that are physically or mentally demanding. These include housework, exercise, paperwork, video games, text messaging, and using the computer. You may need to change your work or school schedule for a while. · Return to your normal activities slowly. Do not try to do too much at once. For either type of hematoma  · Do not drink alcohol until your doctor says it is okay. · Don't drive a car, ride a bike, or operate machinery until your doctor says it's okay. · If you take aspirin or some other blood thinner, be sure to talk to your doctor. He or she will tell you if and when to start taking this medicine again. Make sure that you understand exactly what your doctor wants you to do. · If you normally take medicine, your doctor will tell you if and when you can restart it. He or she will also give you instructions about taking any new medicines. When should you call for help? Call 911 anytime you think you may need emergency care. For example, call if:    · You have a seizure.     · You passed out (lost consciousness).     · You are confused or can't stay awake. Call your doctor now or seek immediate medical care if:    · You have new or worse vomiting.     · You feel less alert.     · You have new weakness or numbness in any part of your body.     · You have a headache that is getting worse. Watch closely for changes in your health, and be sure to contact your doctor if:    · You do not get better as expected.     · You have new symptoms, such as headaches, trouble concentrating, or changes in mood. Where can you learn more? Go to http://www.gray.com/  Enter C264 in the search box to learn more about \"Subdural Hematoma: Care Instructions. \"  Current as of: November 20, 2019               Content Version: 12.6  © 0233-8454 [employer]. Care instructions adapted under license by [employer] (which disclaims liability or warranty for this information). If you have questions about a medical condition or this instruction, always ask your healthcare professional. [address] any warranty or liability for your use of this information.

## 2020-12-17 NOTE — PROGRESS NOTES
Spiritual Care Assessment/Progress Note  [facility]      NAME: Martha Hopper      MRN: [id number]  AGE: 68 y.o. SEX: male  Jehovah's witness Affiliation: Taoist   Language: English     12/17/2020     Total Time (in minutes): 10     Spiritual Assessment begun in [facility] [place] [facility] through conversation with:         [x]Patient        [] Family    [] Friend(s)        Reason for Consult: Initial/Spiritual assessment, patient floor     Spiritual beliefs: (Please include comment if needed)     [x] Identifies with a gertrudis tradition:         [] Supported by a gertrudis community:            [] Claims no spiritual orientation:           [] Seeking spiritual identity:                [] Adheres to an individual form of spirituality:           [] Not able to assess:                           Identified resources for coping:      [x] Prayer                               [] Music                  [] Guided Imagery     [x] Family/friends                 [] Pet visits     [] Devotional reading                         [] Unknown     [] Other:                                               Interventions offered during this visit: (See comments for more details)    Patient Interventions: Affirmation of emotions/emotional suffering, Normalization of emotional/spiritual concerns           Plan of Care:     [x] Support spiritual and/or cultural needs    [] Support AMD and/or advance care planning process      [] Support grieving process   [] Coordinate Rites and/or Rituals    [] Coordination with community clergy   [] No spiritual needs identified at this time   [] Detailed Plan of Care below (See Comments)  [] Make referral to Music Therapy  [] Make referral to Pet Therapy     [] Make referral to Addiction services  [] Make referral to [facility]  [] Make referral to Spiritual Care Partner  [] No future visits requested        [x] Follow up upon further referrals     Comments:  for initial visit.  Pt welcomed visit and shared his hopes for discharge today. Let him know of  support and availability.  provided pastoral listening, support and assurance of prayer. Please contact [phone] [address] [facility] for further support.      [address] Felipe Brock, MACE   [phone])

## 2020-12-17 NOTE — PROGRESS NOTES
Home IV Orders  1. Diagnosis:  Enterococcal bacteremia, morganella UTI   2. Routine PICC Care  3. Antibiotic: zosyn 3.375 grams q6 hours IV   4. Lab each Monday:   CBC/diff/platelets   CMP      5. Lab each Thursday:   CBC/diff/platelets   BUN   Creatinine     6. Fax lab to Dr. Maynor Bond @ [phone].  7.  Call Dr. Maynor Bond @ [phone] for fever >100.5, infection at PICC site, diarrhea, WBC > 15 or < 3, cr > 1.8  8. Duration of therapy: last day of therapy should be December 24, 2020   Please call Dr. Maynor Bond @ [phone] before stopping therapy. 9.  Allergies:     Allergies   Allergen Reactions    Melon Flavor Swelling     Facial swelling with watermelon and cantaloupe       Chris Anne MD

## 2020-12-17 NOTE — PROGRESS NOTES
Transition of Care Plan/Discharge Note   RUR- 12 % Low Risks    DISPOSITION: Home with home health PT and RN for iv abx today    Transport: family- magui Cooper [phone] after infusion teaching at 12:30 pm       Reviewed chart for transitions of care,and discussed in rounds. Per Concha Melton NP, neurointerventional surgery; patient is medically stable for discharge today. [facility] will follow for home health, AVS updated. [facility] will provide infusion. Infusion teaching scheduled for 12:30 pm at bedside. Patient to be discharged home after infusion teaching. Care Management Interventions  PCP Verified by CM: Yes  Palliative Care Criteria Met (RRAT>21 & CHF Dx)?: No  Mode of Transport at Discharge:  Other (see comment)(family- magui Cooper [phone])  Transition of Care Consult (CM Consult): Discharge Planning, [address]: No  Reason Outside [place]: Patient already serviced by other home care/hospice agency  MyChart Signup: No  Discharge Durable Medical Equipment: No  Health Maintenance Reviewed: Yes  Physical Therapy Consult: Yes  Occupational Therapy Consult: Yes  Speech Therapy Consult: Yes  Current Support Network: Own Home  Confirm Follow Up Transport: Family  The Patient and/or Patient Representative was Provided with a Choice of Provider and Agrees with the Discharge Plan?: Yes  Freedom of Choice List was Provided with Basic Dialogue that Supports the Patient's Individualized Plan of Care/Goals, Treatment Preferences and Shares the Quality Data Associated with the Providers?: Yes  [place] Resource Information Provided?: No  Discharge Location  Discharge Placement: Home with home health([facility] [address] )    FAUSTO Menon

## 2020-12-17 NOTE — DISCHARGE SUMMARY
Neurointerventional Surgery Discharge Summary  [address], formerly [facility] [address]    Patient: Ariela Leavitt MRN: [id number]  SSN: [id number]    YOB: 1940  Age: 68 y.o. Sex: male      DATE OF ADMISSION: 12/10/2020    DATE OF DISCHARGE: 12/17/20    PROCEDURES/SURGERIES: Procedure(s) with comments: cerebral angiogram and middle meningeal artery embolization    [address] COURSE:     ICD-10-CM ICD-9-CM    1. Subdural hematoma (HCC)  S06.5X9A 432.1 IR ANGIO CAROTID CRBRL BI INTNL      IR ANGIO CAROTID CRBRL BI INTNL   2. Dizziness  R42 780.4    3. Advance care planning  Z71.89 V65.49      Pt was admitted for scheduled elective cerebral angiogram and MMA embolization by Dr Gary Hendricks. The procedure was performed in the Interventional Radiology suite under general anesthesia. The patient tolerated the procedure well without complications. Following extubation, patient was transferred to ICU, fully recovered and returned to neurological baseline. The patient was admitted overnight for continued monitoring due to potential risks of stroke and thrombosis. There were no neurological events overnight. Pt did develop septic shock related to Enterococcal bacteremia post procedure. The intensivist service and hospitalist service were consulted to assist in management. ID has also been consulted and is following along. A PICC line was placed and pt will be receiving abx therapy outpatient per ID from home health services. The patient was discharged today in stable neurological condition. Patient Vitals for the past 12 hrs:   Temp Pulse Resp BP SpO2   12/17/20 1008 97.9 °F (36.6 °C) 82 17 (!) 149/85 --   12/17/20 0605 97.9 °F (36.6 °C) 86 15 128/79 94 %   12/17/20 0145 97.7 °F (36.5 °C) 81 13 (!) 147/88 95 %       Physical Exam:  GENERAL: NAD, calm, cooperative  HEART: RRR  LUNGS: CTAB  SKIN: Warm, dry, color appropriate for ethnicity.  Groin arteriotomy site soft and non-tender on palpation, dressing is C/D/I, with no active bleeding or drainage noted. Neurologic Exam:  Mental Status:  Alert and oriented x 4. Appropriate affect, mood and behavior. Language:    Normal fluency, repetition, comprehension and naming. Cranial Nerves:   Pupils 3 mm bilaterally, equal, round and reactive to light. Visual fields full to confrontation. Extraocular movements intact. Facial sensation intact. Full facial strength, no asymmetry. Hearing intact bilaterally. No dysarthria. Tongue protrudes to midline, palate elevates symmetrically. Shoulder shrug 5/5 bilaterally. Motor:    No pronator drift. Bulk and tone normal.      5/5 power in all extremities proximally and distally. No involuntary movements. Sensation:    Sensation intact throughout to light touch. Coordination & Gait: Gait deferred. FTN and HTS intact with no ataxia present. Labs:  Lab Results   Component Value Date/Time    Sodium 140 12/17/2020 03:38 AM    Potassium 3.7 12/17/2020 03:38 AM    Chloride 108 12/17/2020 03:38 AM    CO2 27 12/17/2020 03:38 AM    Anion gap 5 12/17/2020 03:38 AM    Glucose 99 12/17/2020 03:38 AM    BUN 13 12/17/2020 03:38 AM    Creatinine 0.81 12/17/2020 03:38 AM    BUN/Creatinine ratio 16 12/17/2020 03:38 AM    GFR est AA >60 12/17/2020 03:38 AM    GFR est non-AA >60 12/17/2020 03:38 AM    Calcium 8.3 (L) 12/17/2020 03:38 AM     Lab Results   Component Value Date/Time    WBC 7.5 12/17/2020 03:38 AM    HGB 10.9 (L) 12/17/2020 03:38 AM    HCT 34.4 (L) 12/17/2020 03:38 AM    PLATELET 075 10/28/9037 03:38 AM    MCV 90.1 12/17/2020 03:38 AM     Lab Results   Component Value Date/Time    MCH 28.5 12/17/2020 03:38 AM    MCHC 31.7 12/17/2020 03:38 AM    BASOPHILS 1 12/17/2020 03:38 AM    ABS. LYMPHOCYTES 1.4 12/17/2020 03:38 AM    ABS. MONOCYTES 0.8 12/17/2020 03:38 AM    ABS. EOSINOPHILS 0.3 12/17/2020 03:38 AM    ABS.  BASOPHILS 0.0 12/17/2020 03:38 AM    Phosphorus 3.7 12/12/2020 04:18 AM    Magnesium 1.7 12/12/2020 04:18 AM       DIET: Normal diet    PLAN:  Discharge home with home health. He will receive antibiotic infusions per ID [facility] and [address] [place] [address] [address] will follow for home health    FOLLOW UP APPOINTMENTS:   1. Follow up in the [facility] clinic in 6 weeks with Dr Leonid Lora. Obtain CT head prior to this appointment. 2   Please make an appointment to follow up with PCP and ID     ACTIVITY:   Do not lift anything over 10 lbs for two weeks from the date of your procedure. Try to limit going up and down stairs as much as possible. Rest and hydrate. May resume all physical activities as tolerated after 2 weeks. WOUND CARE:   Montior for signs and sx of infection including fever, expanding inflammation and discolored drainage. Report any changes in temperature in affected extremity, numbness, weakness or hematoma. If you experience any increased bruising or bleeding at your groin puncture site either outside or under the skin please apply direct, firm pressure for 20 minutes. Keep track of the bruising at the groin site by marking it with a pen or marker. If the bruising continues to be dark purple or blue in color ,OR is expanding , please call our office to let the on call doctor know. We have someone on call 24/7. OTHER RECOMMENDATIONS:  BEFAST reviewed to educate for stroke symptoms. Please call 911 and proceed to emergency department immediately if you develop any of the following:  - Acute changes in your balance or vision  - Weakness in your face, arm or leg   - Speech changes or difficulties. DISCHARGE MEDICATIONS:   1. Resume all home meds  2. Continue antibiotics per Home Health and ID      Current Discharge Medication List      CONTINUE these medications which have NOT CHANGED    Details   latanoprost (XALATAN) 0.005 % ophthalmic solution Administer 1 Drop to left eye nightly.       levETIRAcetam (KEPPRA) 500 mg tablet Take 500 mg by mouth every twelve (12) hours. pantoprazole (PROTONIX) 40 mg tablet Take 40 mg by mouth daily. hydroCHLOROthiazide (HYDRODIURIL) 25 mg tablet Take 12.5 mg by mouth daily. cholecalciferol (Vitamin D3) 25 mcg (1,000 unit) cap Take 1,000 Units by mouth daily. fluticasone propionate (FLONASE) 50 mcg/actuation nasal spray 2 Sprays by Both Nostrils route daily. mometasone (ASMANEX TWISTHALER) 220 mcg/ actuation (60) inhaler Take 2 Puffs by inhalation nightly as needed. tamsulosin (FLOMAX) 0.4 mg capsule Take 1 Cap by mouth nightly. Qty: 14 Cap, Refills: 0      albuterol (PROVENTIL HFA, VENTOLIN HFA, PROAIR HFA) 90 mcg/actuation inhaler Take 1 Puff by inhalation every four (4) hours as needed for Wheezing. Qty: 3 Inhaler, Refills: 4    Associated Diagnoses: Moderate persistent asthma without complication      finasteride (PROSCAR) 5 mg tablet Take 5 mg by mouth nightly. psyllium (METAMUCIL) powd Take 1 Packet by mouth every other day. Cetirizine (ZYRTEC) 10 mg cap Take 10 mg by mouth daily. MULTIVITAMINS (MULTIVITAMIN PO) Take 1 Tab by mouth daily. sertraline (Zoloft) 100 mg tablet Take 50 mg by mouth daily as needed.  Patient states he takes about once per week             Arpit Lopez NP  Neurointerventional Surgery

## 2020-12-17 NOTE — PROGRESS NOTES
ID Progress Note  2020    Subjective:     Afebrile. No dyspnea, abdominal pain, headache or sore throat    ROS: No anaphylaxis, seizures, syncope, hematemesis, hematochezia     Objective:     Vitals:   Visit Vitals  BP (!) 159/86 (BP 1 Location: Left arm, BP Patient Position: At rest)   Pulse 85   Temp 97.9 °F (36.6 °C)   Resp 17   Ht 5' 11\" (1.803 m)   Wt 82.6 kg (182 lb 1.6 oz)   SpO2 97%   BMI 25.40 kg/m²        Tmax:  Temp (24hrs), Av.7 °F (36.5 °C), Min:97.4 °F (36.3 °C), Max:98.1 °F (36.7 °C)      Exam:    Not in distress  Pink conjunctivae, anicteric sclerae  No cervical lymphdenopathy   Lung clear, no rales, wheezes or rhonchi   Heart: s1, s2, RRR, no murmurs rubs or clicks  Abdomen: soft nontender, no guarding or rebound  Knees not warm or tender  Speech fluent     Labs:   Lab Results   Component Value Date/Time    WBC 6.4 2020 11:14 AM    HGB 12.7 2020 11:14 AM    HCT 42.3 2020 11:14 AM    PLATELET 712  11:14 AM    MCV 95.7 2020 11:14 AM     Lab Results   Component Value Date/Time    Sodium 140 2020 01:37 AM    Potassium 3.8 2020 01:37 AM    Chloride 110 (H) 2020 01:37 AM    CO2 25 2020 01:37 AM    Anion gap 5 2020 01:37 AM    Glucose 106 (H) 2020 01:37 AM    BUN 14 2020 01:37 AM    Creatinine 0.85 2020 01:37 AM    BUN/Creatinine ratio 16 2020 01:37 AM    GFR est AA >60 2020 01:37 AM    GFR est non-AA >60 2020 01:37 AM    Calcium 8.8 2020 01:37 AM    Bilirubin, total 0.5 2020 01:04 PM    Alk.  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 The bacteria was able to gain access into the bloodstream after the traumatic Zheng manipulation causing gross hematuria. Continue  Zosyn which would cover the Enterococcus in the blood and in the urine and the morganella. I will write orders.      Viv Eden MD

## 2020-12-17 NOTE — PROGRESS NOTES
Problem: Hemorrhagic Stroke: Day 5 through Discharge  Goal: Activity/Safety  Outcome: Progressing Towards Goal  Goal: Consults, if ordered  Outcome: Progressing Towards Goal  Goal: Diagnostic Test/Procedures  Outcome: Progressing Towards Goal  Goal: Nutrition/Diet  Outcome: Progressing Towards Goal  Goal: Respiratory  Outcome: Progressing Towards Goal  Goal: Treatments/Interventions/Procedures  Outcome: Progressing Towards Goal  Goal: Psychosocial  Outcome: Progressing Towards Goal

## 2020-12-17 NOTE — PROGRESS NOTES
I have reviewed discharge instructions with the patient. The patient verbalized understanding. Patient discharged home with belongings and telemetry removed.       Stroke Education documented in Patient Education: YES  Core Measures Documented in Connect Care:  Risk Factors: YES  Warning signs of stroke: YES  When to Activate 911: YES  Medication Education for Risk Factors: YES  Smoking cessation if applicable: YES  Written Education Given:  YES    Discharge NIH Completed: YES  Score: 0    BRAINS: YES    Follow Up Appointment Made: NO  Date/Time if applicable: follow up in 6 weeks
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
## 2020-12-18 ENCOUNTER — PATIENT OUTREACH (OUTPATIENT)
Dept: CASE MANAGEMENT | Age: 80
End: 2020-12-18

## 2020-12-18 LAB
BACTERIA SPEC CULT: NORMAL
SERVICE CMNT-IMP: NORMAL

## 2020-12-18 NOTE — PROGRESS NOTES
Patient was admitted to [facility] on 12/10/2020 and discharged on 2020 for SDH. Outreach made within 2 business days of discharge: Yes    Top Discharge Challenges to be reviewed by the provider   Additional needs identified to be addressed with provider no  home health care- [facility] and DME- IV ABX  Discussed COVID-19 related testing which was available at this time. Test results were negative. Patient informed of results, if available? no   Method of communication with provider : none       Advance Care Planning:   Does patient have an Advance Directive:  not on file    Inpatient Readmission Risk score:   Was this a readmission? no   Patient stated reason for the admission: surgery  Patients top risk factors for readmission: lack of knowledge about disease, level of motivation and medical condition  Interventions to address risk factors: schedule and attend follow up appointments, monitor for infection and AMS, complete IV ABX, participate with HH, no lifting over 10 pounds, limiting climbing stairs, monitor for stroke S&S    Care Transition Nurse (CTN) contacted the patient by telephone to perform post hospital discharge assessment. Verified name and  with patient as identifiers. Provided introduction to self, and explanation of the CTN role. CTN reviewed discharge instructions, medical action plan and red flags with patient who verbalized understanding. Patient given an opportunity to ask questions and does not have any further questions or concerns at this time. The patient agrees to contact the PCP office for questions related to their healthcare. Medication reconciliation was performed with patient, who verbalizes understanding of administration of home medications. Advised obtaining a 90-day supply of all daily and as-needed medications.    Referral to Pharm D needed: no     Home Health/Outpatient orders at discharge: PT and [address]: [facility]   Date of initial visit: 12/18/2020    Durable Medical Equipment ordered at discharge: [id number]: [address] Equipment received: yes    Covid Risk Education    Patient has following risk factors of: asthma and sepsis. Education provided regarding infection prevention, and signs and symptoms of COVID-19 and when to seek medical attention with patient who verbalized understanding. Discussed exposure protocols and quarantine From CDC: Are you at higher risk for severe illness?  and given an opportunity for questions and concerns. The patient agrees to contact the COVID-19 hotline [phone] or PCP office for questions related to COVID-19. For more information on steps you can take to protect yourself, see CDC's How to Protect Yourself     Patient/family/caregiver given information for [address] and agrees to enroll n/a  Patient's preferred e-mail: n/a  Patient's preferred phone number: n/a    Discussed follow-up appointments. If no appointment was previously scheduled, appointment scheduling offered: [facility] follow up appointment(s): No future appointments. Non-[facility] follow up appointment(s): ID and neuro TBD uro 12/28/2020  Plan for follow-up call in 7-10 days based on severity of symptoms and risk factors. CTN provided contact information for future needs. Goals Addressed                 This Visit's Progress     Prevent complications post hospitalization. 12/18/2020 Patient and daughter, Juan Reed, reports all medications in home and taking without issue, [facility] will see patient 12/18/2020, uro appointment 12/28/2020, Dr. Pina Rouse is schedule d for end of Jan but unable to give exact date at this time. Denies chest pain, SOB, fever, N/V/D. Daughter will monitor for S&S of infection and for AMS and will report at next outreach.  COBY

## 2021-01-22 ENCOUNTER — PATIENT OUTREACH (OUTPATIENT)
Dept: CASE MANAGEMENT | Age: 81
End: 2021-01-22

## 2021-01-22 NOTE — PROGRESS NOTES
Patient has graduated from the Transitions of Care Coordination  program on 1/22/21. Patient/family has the ability to self-manage at this time Care management goals have been completed. Patient was not referred to the [facility] team for further management. Goals Addressed                 This Visit's Progress     COMPLETED: Prevent complications post hospitalization. 12/18/2020 Patient and daughter, Lizabeth Sarabia, reports all medications in home and taking without issue, [place] will see patient 12/18/2020, uro appointment 12/28/2020, Dr. Harini Miguel is schedule d for end of Jan but unable to give exact date at this time. Denies chest pain, SOB, fever, N/V/D. Daughter will monitor for S&S of infection and for AMS and will report at next outreach. COBY    1/22/21 Daughter reports patient is doing well, has attended all appointments,has CT scan scheduled for 1/26/21 then they will follow up with neuro after. Denies S&S of infection. Patient has had no ED or hospital admissions 30 days post discharge. Goal complete. [facility]                 Patient has Care Transition Nurse's contact information for any further questions, concerns, or needs.   Patients upcoming visits:    Future Appointments   Date Time Provider Kristine Perez   1/26/2021 10:00 AM [facility] [place] CT ER 1 HRCT . [facility]

## 2021-01-26 ENCOUNTER — HOSPITAL ENCOUNTER (OUTPATIENT)
Dept: CT IMAGING | Age: 81
Discharge: HOME | End: 2021-01-26
Attending: NURSE PRACTITIONER
Payer: MEDICARE

## 2021-01-26 DIAGNOSIS — S06.5XAA SUBDURAL HEMATOMA: ICD-10-CM

## 2021-01-26 PROCEDURE — 70450 CT HEAD/BRAIN W/O DYE: CPT

## 2021-01-27 ENCOUNTER — TELEPHONE (OUTPATIENT)
Dept: NEUROSURGERY | Age: 81
End: 2021-01-27

## 2021-01-27 NOTE — TELEPHONE ENCOUNTER
KELLYI  Patients daughter called in and stated that the patient had a spot on his neck she stated that after a couple days it had pus coming from it and it looked like a stitch was there. She stated that after messing with it the area and cleaning it the area started to look better. Patient is scheduled for virtual visit 2/2/2021.

## 2021-01-28 NOTE — TELEPHONE ENCOUNTER
Spoke to daughter and informed her of provider's response regarding area on patients neck. Daughter stated the area on his neck looked better. Spouse had squeezed area and drainage resulted. Stated are is scabbed over and has a small lump underneath. Advised daughter to have area checked by PCP or urgent care so as not to cause any underlying infection. Daughter stated understanding.

## 2021-02-02 ENCOUNTER — VIRTUAL VISIT (OUTPATIENT)
Dept: NEUROSURGERY | Age: 81
End: 2021-02-02
Payer: MEDICARE

## 2021-02-02 DIAGNOSIS — S06.5XAA SUBDURAL HEMATOMA: Primary | ICD-10-CM

## 2021-02-02 PROCEDURE — 99213 OFFICE O/P EST LOW 20 MIN: CPT | Performed by: RADIOLOGY

## 2021-02-02 PROCEDURE — G8419 CALC BMI OUT NRM PARAM NOF/U: HCPCS | Performed by: RADIOLOGY

## 2021-02-02 PROCEDURE — G8427 DOCREV CUR MEDS BY ELIG CLIN: HCPCS | Performed by: RADIOLOGY

## 2021-02-02 PROCEDURE — 1101F PT FALLS ASSESS-DOCD LE1/YR: CPT | Performed by: RADIOLOGY

## 2021-02-02 PROCEDURE — G8536 NO DOC ELDER MAL SCRN: HCPCS | Performed by: RADIOLOGY

## 2021-02-02 PROCEDURE — G8756 NO BP MEASURE DOC: HCPCS | Performed by: RADIOLOGY

## 2021-02-02 PROCEDURE — G8432 DEP SCR NOT DOC, RNG: HCPCS | Performed by: RADIOLOGY
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
## 2021-02-02 NOTE — PROGRESS NOTES
Neurointerventional Surgery Clinic Note    Alanis Swift is a 68 y.o. male who was seen by synchronous (real-time) audio-video technology on 2/2/2021. Consent: Alanis Swift, who was seen by synchronous (real-time) audio-video technology, and/or his healthcare decision maker, is aware that this patient-initiated, Telehealth encounter on 2/2/2021 is a billable service, with coverage as determined by his insurance carrier. He is aware that he may receive a bill and has provided verbal consent to proceed: Yes. Assessment & Plan:   68 y.o. left-handed male with history of hypertension, arthritis, asthma, gastroesophageal reflux disease, and benign prostatic hypertrophy who presents on follow-up after middle meningeal artery embolization for treatment of chronic subdural hematomas. Procedure was performed on 12/10/2020 and was technically successful, however, patient did suffer from septic shock related to enterococcal bacteremia caused by Zheng catheter placement for the procedure. Patient recovered well in the hospital and was discharged. Aside from some stable slight \"wooziness\" and very minimal headaches, patient has no neurological complaints. He remains neurologically intact. He had a urology follow-up and he was told that everything is fine. He has another urology follow-up in July. Follow-up head CT performed 1/26/2021 demonstrates complete resolution of the subdural hematomas. No further neuro interventional surgery follow-up is indicated. Patient inquired about stopping Keppra and also returning to his normal routine. I advised him to follow-up with Dr. Renato Solis in Neurosurgery to discuss stopping Keppra. I also advised him that it is okay if he slowly eases back into his normal routine. I spent at least 20 minutes on this visit with this established patient.     Subjective:   Alanis Swift is a 68 y.o. left-handed male   with history of hypertension, arthritis, asthma, gastroesophageal reflux disease, and benign prostatic hypertrophy who presents on follow-up after middle meningeal artery embolization for treatment of chronic subdural hematomas. Procedure was performed on 12/10/2020 and was technically successful, however, patient did suffer from septic shock related to enterococcal bacteremia caused by Zheng catheter placement for the procedure. Patient recovered well in the hospital and was discharged. Since discharge, patient has been doing well. He reports slight \"wooziness\" in the mornings and sinus disease. He reports slight headaches that are 1-2 out of 10 in severity and not bad enough that he needs any medications for. He received the first dose of his Covid vaccine last Thursday. Patient denies nausea and vomiting, numbness and tingling, weakness, vision changes, speech problems, and problems with balance and coordination. He reports that his blood pressure has been fine. He had a follow-up with urologist and will have another follow-up in July. Recently, patient had what sounds like a focal pustule with associated either ingrown hair or residual small stitch in the skin of the left neck where his prior central line was located. This has since resolved. He denies any redness in the area, pain, and fevers. Follow-up head CT performed 1/26/2021 demonstrates complete resolution of the subdural hematomas.      Chief Complaint: Results (Follow up)      Prior to Admission medications    Medication Sig Start Date End Date Taking? Authorizing Provider   latanoprost (XALATAN) 0.005 % ophthalmic solution Administer 1 Drop to left eye nightly. Yes Provider, Historical   levETIRAcetam (KEPPRA) 500 mg tablet Take 500 mg by mouth every twelve (12) hours. 10/25/20  Yes Provider, Historical   pantoprazole (PROTONIX) 40 mg tablet Take 40 mg by mouth daily.    Yes Provider, Historical   hydroCHLOROthiazide (HYDRODIURIL) 25 mg tablet Take 12.5 mg by mouth daily. Yes Provider, Historical   cholecalciferol (Vitamin D3) 25 mcg (1,000 unit) cap Take 1,000 Units by mouth daily. Yes Provider, Historical   fluticasone propionate (FLONASE) 50 mcg/actuation nasal spray 2 Sprays by Both Nostrils route daily. Yes Provider, Historical   mometasone (ASMANEX TWISTHALER) 220 mcg/ actuation (60) inhaler Take 2 Puffs by inhalation nightly as needed. Yes Provider, Historical   sertraline (Zoloft) 100 mg tablet Take 50 mg by mouth daily as needed. Patient states he takes about once per week   Yes Provider, Historical   tamsulosin (FLOMAX) 0.4 mg capsule Take 1 Cap by mouth nightly. 4/1/19  Yes Judit Bhatti NP   albuterol (PROVENTIL HFA, VENTOLIN HFA, PROAIR HFA) 90 mcg/actuation inhaler Take 1 Puff by inhalation every four (4) hours as needed for Wheezing. 11/30/17  Yes Cheyenne Borges MD   finasteride (PROSCAR) 5 mg tablet Take 5 mg by mouth nightly. Yes Provider, Historical   psyllium (METAMUCIL) powd Take 1 Packet by mouth every other day. Yes Provider, Historical   Cetirizine (ZYRTEC) 10 mg cap Take 10 mg by mouth daily. Yes Provider, Historical   MULTIVITAMINS (MULTIVITAMIN PO) Take 1 Tab by mouth daily.  7/22/10  Yes Provider, Historical     Allergies   Allergen Reactions    Melon Flavor Swelling     Facial swelling with watermelon and cantaloupe       Patient Active Problem List   Diagnosis Code    GERD (gastroesophageal reflux disease) K21.9    BPH (benign prostatic hyperplasia) N40.0    Asthma J45.909    Dyslipidemia E78.5    Personal history of colonic polyps Z86.010    Degenerative arthritis of lumbar spine M47.816    H. pylori infection A04.8    Allergic rhinosinusitis J30.9    Chronic mastoiditis H70.10    Dizziness R42    Advance care planning Z71.89    Prediabetes R73.03    Essential hypertension I10    Subdural hematoma ([address] V17.3N8L     Patient Active Problem List    Diagnosis Date Noted    Subdural hematoma ([address] 11/20/2020    Essential hypertension 05/14/2019    Advance care planning 05/25/2016    Prediabetes 05/25/2016    Dizziness 02/26/2016    Chronic mastoiditis 11/21/2014    Allergic rhinosinusitis 11/08/2013    H. pylori infection 11/16/2012    Degenerative arthritis of lumbar spine 09/07/2011    Personal history of colonic polyps 01/21/2011    GERD (gastroesophageal reflux disease) 07/29/2010    BPH (benign prostatic hyperplasia) 07/29/2010    Asthma 07/29/2010    Dyslipidemia 07/29/2010     Current Outpatient Medications   Medication Sig Dispense Refill    latanoprost (XALATAN) 0.005 % ophthalmic solution Administer 1 Drop to left eye nightly.  levETIRAcetam (KEPPRA) 500 mg tablet Take 500 mg by mouth every twelve (12) hours.  pantoprazole (PROTONIX) 40 mg tablet Take 40 mg by mouth daily.  hydroCHLOROthiazide (HYDRODIURIL) 25 mg tablet Take 12.5 mg by mouth daily.  cholecalciferol (Vitamin D3) 25 mcg (1,000 unit) cap Take 1,000 Units by mouth daily.  fluticasone propionate (FLONASE) 50 mcg/actuation nasal spray 2 Sprays by Both Nostrils route daily.  mometasone (ASMANEX TWISTHALER) 220 mcg/ actuation (60) inhaler Take 2 Puffs by inhalation nightly as needed.  sertraline (Zoloft) 100 mg tablet Take 50 mg by mouth daily as needed. Patient states he takes about once per week      tamsulosin (FLOMAX) 0.4 mg capsule Take 1 Cap by mouth nightly. 14 Cap 0    albuterol (PROVENTIL HFA, VENTOLIN HFA, PROAIR HFA) 90 mcg/actuation inhaler Take 1 Puff by inhalation every four (4) hours as needed for Wheezing. 3 Inhaler 4    finasteride (PROSCAR) 5 mg tablet Take 5 mg by mouth nightly.  psyllium (METAMUCIL) powd Take 1 Packet by mouth every other day.  Cetirizine (ZYRTEC) 10 mg cap Take 10 mg by mouth daily.  MULTIVITAMINS (MULTIVITAMIN PO) Take 1 Tab by mouth daily.        Allergies   Allergen Reactions    Melon Flavor Swelling     Facial swelling with watermelon and cantaloupe     Past Medical History:   Diagnosis Date    Anxiety disorder     Arthritis     Asthma     BPH     Elevated PSA     GERD (gastroesophageal reflux disease)     Hearing reduced     Hypertension     Peyronie disease     Ringing in ear     Snoring      Past Surgical History:   Procedure Laterality Date    COLONOSCOPY N/A 2020    COLONOSCOPY performed by Boston Cervantes MD at [facility] ENDOSCOPY    HX CYST REMOVAL      cyst removal back    HX GI      colon polyp ; colonoscopy - needs every 5 yrs - 3/09 (-); repeat 3/2014 - Dr. Jack Lipscomb HX HEENT      L ear surgery - tympanomastoidectomy (revision 1st surgery  for \"hole in TM\"; s\p choleostoma; b/l cataract surgery - followed at [facility] inguinal hernia repair     Family History   Problem Relation Age of Onset    Cancer Mother         gall baldder     Social History     Tobacco Use    Smoking status: Former Smoker     Quit date: 1970     Years since quittin.1    Smokeless tobacco: Never Used    Tobacco comment: started age [age] - <1ppd stopped    Substance Use Topics    Alcohol use: Not Currently       ROS: Pertinent ROS included in HPI    Objective:   Vital Signs: (As obtained by patient/caregiver at home)  There were no vitals taken for this visit.        Constitutional: [x] Appears well-developed and well-nourished [x] No apparent distress      [] Abnormal -     Mental status: [x] Alert and awake  [x] Oriented to person/place/time [x] Able to follow commands    [] Abnormal -     Eyes:   EOM    [x]  Normal    [] Abnormal -   Sclera  [x]  Normal    [] Abnormal -          Discharge [x]  None visible   [] Abnormal -     HENT: [x] Normocephalic, atraumatic  [] Abnormal -   [x] Mouth/Throat: Mucous membranes are moist    External Ears [x] Normal  [] Abnormal -    Neck: [x] No visualized mass [] Abnormal -     Pulmonary/Chest: [x] Respiratory effort normal [x] No visualized signs of difficulty breathing or respiratory distress        [] Abnormal -      Musculoskeletal:   [x] Normal gait with no signs of ataxia         [x] Normal range of motion of neck        [] Abnormal -     Neurological:        [x] No Facial Asymmetry (Cranial nerve 7 motor function) (limited exam due to video visit)          [x] No gaze palsy        [] Abnormal -          Skin:        [x] No significant exanthematous lesions or discoloration noted on facial skin         [] Abnormal -            Psychiatric:       [x] Normal Affect [] Abnormal -        [x] No Hallucinations    Other pertinent observable physical exam findings:-    Neurologic Exam:  Mental Status:  Alert and oriented x 4. Appropriate affect, mood and behavior. Language:    Normal fluency, repetition, comprehension and naming. Cranial Nerves:   Cannot assess pupillary reaction. Visual fields appear to be full to confrontation, but difficult to evaluate virtually. Extraocular movements intact. Facial sensation intact V1 - V3. Full facial strength, no asymmetry. Hearing intact bilaterally. No dysarthria. Tongue protrudes to midline, palate elevates symmetrically. Shoulder shrug 5/5 bilaterally. Motor:    No pronator drift. Bulk and tone appear to be normal.      Difficult to evaluate strength, but seems intact     No involuntary movements. Sensation:    Sensation grossly intact subjectively    Reflexes:    Deferred    Coordination & Gait: Gait normal.     We discussed the expected course, resolution and complications of the diagnosis(es) in detail. Medication risks, benefits, costs, interactions, and alternatives were discussed as indicated. I advised him to contact the office if his condition worsens, changes or fails to improve as anticipated. He expressed understanding with the diagnosis(es) and plan.        Ciara Crenshaw is a 68 y.o. male who was evaluated by a video visit encounter for concerns as above. Patient identification was verified prior to start of the visit. A caregiver was present when appropriate. Due to this being a TeleHealth encounter (During [facility]-37 public health emergency), evaluation of the following organ systems was limited: Vitals/Constitutional/EENT/Resp/CV/GI//MS/Neuro/Skin/Heme-Lymph-Imm. Pursuant to the emergency declaration under the [address] [place] [address], [facility] waiver authority and the [employer] Act, this Virtual  Visit was conducted, with patient's (and/or legal guardian's) consent, to reduce the patient's risk of exposure to COVID-19 and provide necessary medical care. Services were provided through a video synchronous discussion virtually to substitute for in-person clinic visit. Patient was located at home, and provider was located in office. This visit was completed virtually using Doxy. jan Gutierrez MD

## 2021-02-02 NOTE — PATIENT INSTRUCTIONS
No further follow up is indicated at this time. Follow up with Dr. Zachary Mcclendon regarding stopping Keppra. A Healthy Lifestyle: Care Instructions Your Care Instructions A healthy lifestyle can help you feel good, stay at a healthy weight, and have plenty of energy for both work and play. A healthy lifestyle is something you can share with your whole family. A healthy lifestyle also can lower your risk for serious health problems, such as high blood pressure, heart disease, and diabetes. You can follow a few steps listed below to improve your health and the health of your family. Follow-up care is a key part of your treatment and safety. Be sure to make and go to all appointments, and call your doctor if you are having problems. It's also a good idea to know your test results and keep a list of the medicines you take. How can you care for yourself at home? · Do not eat too much sugar, fat, or fast foods. You can still have dessert and treats now and then. The goal is moderation. · Start small to improve your eating habits. Pay attention to portion sizes, drink less juice and soda pop, and eat more fruits and vegetables. ? Eat a healthy amount of food. A 3-ounce serving of meat, for example, is about the size of a deck of cards. Fill the rest of your plate with vegetables and whole grains. ? Limit the amount of soda and sports drinks you have every day. Drink more water when you are thirsty. ? Eat at least 5 servings of fruits and vegetables every day. It may seem like a lot, but it is not hard to reach this goal. A serving or helping is 1 piece of fruit, 1 cup of vegetables, or 2 cups of leafy, raw vegetables. Have an apple or some carrot sticks as an afternoon snack instead of a candy bar.  Try to have fruits and/or vegetables at every meal. 
 · Make exercise part of your daily routine. You may want to start with simple activities, such as walking, bicycling, or slow swimming. Try to be active 30 to 60 minutes every day. You do not need to do all 30 to 60 minutes all at once. For example, you can exercise 3 times a day for 10 or 20 minutes. Moderate exercise is safe for most people, but it is always a good idea to talk to your doctor before starting an exercise program. 
· Keep moving. Mow the lawn, work in the garden, or clean your house. Take the stairs instead of the elevator at work. 
· If you smoke, quit. People who smoke have an increased risk for heart attack, stroke, cancer, and other lung illnesses. Quitting is hard, but there are ways to boost your chance of quitting tobacco for good. 
? Use nicotine gum, patches, or lozenges. 
? Ask your doctor about stop-smoking programs and medicines. 
? Keep trying. 
In addition to reducing your risk of diseases in the future, you will notice some benefits soon after you stop using tobacco. If you have shortness of breath or asthma symptoms, they will likely get better within a few weeks after you quit. 
· Limit how much alcohol you drink. Moderate amounts of alcohol (up to 2 drinks a day for men, 1 drink a day for women) are okay. But drinking too much can lead to liver problems, high blood pressure, and other health problems. 
Family health 
If you have a family, there are many things you can do together to improve your health. 
· Eat meals together as a family as often as possible. 
· Eat healthy foods. This includes fruits, vegetables, lean meats and dairy, and whole grains. 
· Include your family in your fitness plan. Most people think of activities such as jogging or tennis as the way to fitness, but there are many ways you and your family can be more active. Anything that makes you breathe hard and gets your heart pumping is exercise. Here are some tips: 
 ? Walk to do errands or to take your child to school or the bus. 
? Go for a family bike ride after dinner instead of watching TV. Where can you learn more? Go to http://www.gray.com/ Enter Q426 in the search box to learn more about \"A Healthy Lifestyle: Care Instructions. \" Current as of: January 31, 2020               Content Version: 12.6 © 2006-2020 [employer]. Care instructions adapted under license by [employer] (which disclaims liability or warranty for this information). If you have questions about a medical condition or this instruction, always ask your healthcare professional. [address] any warranty or liability for your use of this information.

## 2021-07-07 ENCOUNTER — TRANSCRIBE ORDER (OUTPATIENT)
Dept: SCHEDULING | Age: 81
End: 2021-07-07

## 2021-07-07 DIAGNOSIS — S06.5XAA SDH (SUBDURAL HEMATOMA): Primary | ICD-10-CM
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
## 2022-03-19 PROBLEM — I10 ESSENTIAL HYPERTENSION: Status: ACTIVE | Noted: 2019-05-14

## 2022-03-19 PROBLEM — S06.5XAA SUBDURAL HEMATOMA (HCC): Status: ACTIVE | Noted: 2020-11-20

## 2022-06-30 ENCOUNTER — TRANSCRIBE ORDER (OUTPATIENT)
Dept: SCHEDULING | Age: 82
End: 2022-06-30

## 2022-06-30 DIAGNOSIS — R63.4 LOSS OF WEIGHT: Primary | ICD-10-CM

## 2022-06-30 DIAGNOSIS — R10.9 STOMACH ACHE: ICD-10-CM

## 2022-07-06 ENCOUNTER — HOSPITAL ENCOUNTER (OUTPATIENT)
Dept: GENERAL RADIOLOGY | Age: 82
Discharge: HOME | End: 2022-07-06
Attending: FAMILY MEDICINE
Payer: MEDICARE

## 2022-07-06 ENCOUNTER — HOSPITAL ENCOUNTER (OUTPATIENT)
Dept: ULTRASOUND IMAGING | Age: 82
Discharge: HOME | End: 2022-07-06
Attending: FAMILY MEDICINE
Payer: MEDICARE

## 2022-07-06 ENCOUNTER — TRANSCRIBE ORDER (OUTPATIENT)
Dept: REGISTRATION | Age: 82
End: 2022-07-06

## 2022-07-06 DIAGNOSIS — R63.4 LOSS OF WEIGHT: ICD-10-CM

## 2022-07-06 DIAGNOSIS — R63.4 WEIGHT LOSS: ICD-10-CM

## 2022-07-06 DIAGNOSIS — R10.9 ABDOMINAL PAIN: Primary | ICD-10-CM

## 2022-07-06 DIAGNOSIS — R10.9 STOMACH ACHE: ICD-10-CM

## 2022-07-06 DIAGNOSIS — R10.9 ABDOMINAL PAIN: ICD-10-CM

## 2022-07-06 PROCEDURE — 76700 US EXAM ABDOM COMPLETE: CPT

## 2022-07-06 PROCEDURE — 71046 X-RAY EXAM CHEST 2 VIEWS: CPT

## 2022-08-10 ENCOUNTER — TRANSCRIBE ORDER (OUTPATIENT)
Dept: SCHEDULING | Age: 82
End: 2022-08-10

## 2022-08-10 DIAGNOSIS — K59.00 CONSTIPATION: ICD-10-CM

## 2022-08-10 DIAGNOSIS — R63.4 LOSS OF WEIGHT: Primary | ICD-10-CM

## 2022-08-10 DIAGNOSIS — R10.30 LOWER ABDOMINAL PAIN: ICD-10-CM

## 2022-08-11 ENCOUNTER — HOSPITAL ENCOUNTER (OUTPATIENT)
Dept: CT IMAGING | Age: 82
Discharge: HOME | End: 2022-08-11
Attending: SPECIALIST
Payer: MEDICARE

## 2022-08-11 DIAGNOSIS — R10.30 LOWER ABDOMINAL PAIN: ICD-10-CM

## 2022-08-11 DIAGNOSIS — R63.4 LOSS OF WEIGHT: ICD-10-CM

## 2022-08-11 DIAGNOSIS — K59.00 CONSTIPATION: ICD-10-CM

## 2022-08-11 PROCEDURE — 74011000636 HC RX REV CODE- 636: Performed by: SPECIALIST

## 2022-08-11 PROCEDURE — 74177 CT ABD & PELVIS W/CONTRAST: CPT

## 2022-08-11 RX ADMIN — IOPAMIDOL 100 ML: 755 INJECTION, SOLUTION INTRAVENOUS at 14:23

## 2022-12-05 ENCOUNTER — ANESTHESIA (OUTPATIENT)
Dept: ENDOSCOPY | Age: 82
End: 2022-12-05
Payer: MEDICARE

## 2022-12-05 ENCOUNTER — APPOINTMENT (OUTPATIENT)
Dept: ULTRASOUND IMAGING | Age: 82
End: 2022-12-05
Attending: INTERNAL MEDICINE
Payer: MEDICARE

## 2022-12-05 ENCOUNTER — ANESTHESIA EVENT (OUTPATIENT)
Dept: ENDOSCOPY | Age: 82
End: 2022-12-05
Payer: MEDICARE

## 2022-12-05 ENCOUNTER — HOSPITAL ENCOUNTER (OUTPATIENT)
Age: 82
Setting detail: OUTPATIENT SURGERY
Discharge: HOME | End: 2022-12-05
Attending: INTERNAL MEDICINE | Admitting: INTERNAL MEDICINE
Payer: MEDICARE

## 2022-12-05 VITALS
WEIGHT: 160 LBS | SYSTOLIC BLOOD PRESSURE: 144 MMHG | HEART RATE: 62 BPM | DIASTOLIC BLOOD PRESSURE: 81 MMHG | BODY MASS INDEX: 22.9 KG/M2 | OXYGEN SATURATION: 98 % | RESPIRATION RATE: 14 BRPM | TEMPERATURE: 97.8 F | HEIGHT: 70 IN

## 2022-12-05 PROCEDURE — 74011250636 HC RX REV CODE- 250/636: Performed by: NURSE ANESTHETIST, CERTIFIED REGISTERED

## 2022-12-05 PROCEDURE — 77030010936 HC CLP LIG BSC -C: Performed by: INTERNAL MEDICINE

## 2022-12-05 PROCEDURE — 74011000250 HC RX REV CODE- 250: Performed by: NURSE ANESTHETIST, CERTIFIED REGISTERED

## 2022-12-05 PROCEDURE — 2709999900 HC NON-CHARGEABLE SUPPLY: Performed by: INTERNAL MEDICINE

## 2022-12-05 PROCEDURE — 77030021593 HC FCPS BIOP ENDOSC BSC -A: Performed by: INTERNAL MEDICINE

## 2022-12-05 PROCEDURE — 88305 TISSUE EXAM BY PATHOLOGIST: CPT

## 2022-12-05 PROCEDURE — 76040000019: Performed by: INTERNAL MEDICINE

## 2022-12-05 PROCEDURE — 76060000031 HC ANESTHESIA FIRST 0.5 HR: Performed by: INTERNAL MEDICINE

## 2022-12-05 DEVICE — WORKING LENGTH 235CM, WORKING CHANNEL 2.8MM
Type: IMPLANTABLE DEVICE | Site: ESOPHAGUS | Status: FUNCTIONAL
Brand: RESOLUTION 360 CLIP

## 2022-12-05 RX ORDER — SODIUM CHLORIDE 0.9 % (FLUSH) 0.9 %
5-40 SYRINGE (ML) INJECTION AS NEEDED
Status: DISCONTINUED | OUTPATIENT
Start: 2022-12-05 | End: 2022-12-05 | Stop reason: HOSPADM

## 2022-12-05 RX ORDER — SODIUM CHLORIDE 9 MG/ML
50 INJECTION, SOLUTION INTRAVENOUS CONTINUOUS
Status: DISCONTINUED | OUTPATIENT
Start: 2022-12-05 | End: 2022-12-05 | Stop reason: HOSPADM

## 2022-12-05 RX ORDER — EPINEPHRINE 0.1 MG/ML
1 INJECTION INTRACARDIAC; INTRAVENOUS
Status: DISCONTINUED | OUTPATIENT
Start: 2022-12-05 | End: 2022-12-05 | Stop reason: HOSPADM

## 2022-12-05 RX ORDER — SODIUM CHLORIDE 9 MG/ML
INJECTION, SOLUTION INTRAVENOUS
Status: DISCONTINUED | OUTPATIENT
Start: 2022-12-05 | End: 2022-12-05 | Stop reason: HOSPADM

## 2022-12-05 RX ORDER — NALOXONE HYDROCHLORIDE 0.4 MG/ML
0.4 INJECTION, SOLUTION INTRAMUSCULAR; INTRAVENOUS; SUBCUTANEOUS
Status: DISCONTINUED | OUTPATIENT
Start: 2022-12-05 | End: 2022-12-05 | Stop reason: HOSPADM

## 2022-12-05 RX ORDER — ATROPINE SULFATE 0.1 MG/ML
0.5 INJECTION INTRAVENOUS
Status: DISCONTINUED | OUTPATIENT
Start: 2022-12-05 | End: 2022-12-05 | Stop reason: HOSPADM

## 2022-12-05 RX ORDER — PROPOFOL 10 MG/ML
INJECTION, EMULSION INTRAVENOUS AS NEEDED
Status: DISCONTINUED | OUTPATIENT
Start: 2022-12-05 | End: 2022-12-05 | Stop reason: HOSPADM

## 2022-12-05 RX ORDER — LIDOCAINE HYDROCHLORIDE 20 MG/ML
INJECTION, SOLUTION EPIDURAL; INFILTRATION; INTRACAUDAL; PERINEURAL AS NEEDED
Status: DISCONTINUED | OUTPATIENT
Start: 2022-12-05 | End: 2022-12-05 | Stop reason: HOSPADM

## 2022-12-05 RX ORDER — FLUMAZENIL 0.1 MG/ML
0.2 INJECTION INTRAVENOUS
Status: DISCONTINUED | OUTPATIENT
Start: 2022-12-05 | End: 2022-12-05 | Stop reason: HOSPADM

## 2022-12-05 RX ORDER — DEXTROMETHORPHAN/PSEUDOEPHED 2.5-7.5/.8
1.2 DROPS ORAL
Status: DISCONTINUED | OUTPATIENT
Start: 2022-12-05 | End: 2022-12-05 | Stop reason: HOSPADM

## 2022-12-05 RX ADMIN — SODIUM CHLORIDE: 900 INJECTION, SOLUTION INTRAVENOUS at 15:03

## 2022-12-05 RX ADMIN — PROPOFOL 50 MG: 10 INJECTION, EMULSION INTRAVENOUS at 15:07

## 2022-12-05 RX ADMIN — PROPOFOL 50 MG: 10 INJECTION, EMULSION INTRAVENOUS at 15:15

## 2022-12-05 RX ADMIN — PROPOFOL 50 MG: 10 INJECTION, EMULSION INTRAVENOUS at 15:19

## 2022-12-05 RX ADMIN — PROPOFOL 50 MG: 10 INJECTION, EMULSION INTRAVENOUS at 15:23

## 2022-12-05 RX ADMIN — PROPOFOL 50 MG: 10 INJECTION, EMULSION INTRAVENOUS at 15:10

## 2022-12-05 RX ADMIN — LIDOCAINE HYDROCHLORIDE 60 MG: 20 INJECTION, SOLUTION EPIDURAL; INFILTRATION; INTRACAUDAL; PERINEURAL at 15:07

## 2022-12-05 NOTE — PROCEDURES
[address], [address]  [phone]                                Endoscopic Ultrasound    NAME:  Emmaline Bamberger   :   1940   MRN:   [id number]       Date/Time:  2022   Procedure Type: Linear Upper EUS       Indications: Pancreatic cyst    Pre-operative Diagnosis: see indication above    Post-operative Diagnosis:  See findings below    : Kiko Randhawa MD    Surgical Assistant: Endoscopy Technician-1: Latrice Marsh  Endoscopy RN-1: Anne Carias RN    Implants: none    Referring Provider: Carmel Tapia MD -Brooklyn Llanos MD    Anethesia/Sedation:  MAC anesthesia      Procedure Details   After infom consent was obtained for the procedure, with all risks and benefits of procedure explained the patient was taken to the endoscopy suite and placed in the left lateral decubitus position. Following sequential administration of sedation as per above, the linear echoendoscope was inserted into the mouth and advanced under direct vision to second portion of the duodenum. A careful inspection was made as the gastroscope was withdrawn, including a retroflexed view of the proximal stomach; findings and interventions are described below. Findings:     Endoscopic:   Esophagus: LA grade B esophagitis was noted at the GE junction located at 38 cm. Mild nodularity and superficial ulceration was noted as well. Sliding hiatal hernia was noted with diaphragmatic pinch at 40 cm   Stomach: normal    Duodenum/jejunum: normal    Ultrasound:   Esophagus: not examined   Stomach: not examined   Pancreas:     Areas examined: the entire gland    Parenchyma: -A thin-walled cystic lesion was noted in the head of the pancreas and measured about 15 mm x 9 mm in size. No wall thickness, mural nodule or associated mass was noted. No clear communication with the pancreatic duct or sidebranch was noted.   Patient is pancreas was normal    Pancreatic Duct: normal findings   Liver:     Parenchyma: not examined    Gallbladder:  Gallbladder with 1 stone    Bile Duct: the common bile duct normal in caliber and without any filling defect               Lymph Node: no adenopathy        Specimen Removed:   Biopsy of esophagus at the GE junction. Complications: None. EBL:  None. Interventions: Cold forceps biopsy of the esophagus at the GE junction.   1 resolution clip was applied at the GE junction for hemostasis post biopsy      Recommendations:   -Await pathology  -Continue current medications  -Start PPI (omeprazole 20 mg once daily before breakfast, E prescription sent to pharmacy)  -Follow up with Dr. Colin Garza  -Repeat MRI in 6 months to assess for stability of the pancreatic cyst    Bina Santizo MD  12/5/2022  3:35 PM

## 2022-12-05 NOTE — ANESTHESIA PREPROCEDURE EVALUATION
Relevant Problems   No relevant active problems       Anesthetic History   No history of anesthetic complications            Review of Systems / Medical History  Patient summary reviewed, nursing notes reviewed and pertinent labs reviewed    Pulmonary            Asthma        Neuro/Psych   Within defined limits           Cardiovascular    Hypertension              Exercise tolerance: >4 METS     GI/Hepatic/Renal     GERD           Endo/Other        Arthritis     Other Findings              Physical Exam    Airway  Mallampati: I  TM Distance: > 6 cm  Neck ROM: normal range of motion   Mouth opening: Normal     Cardiovascular    Rhythm: regular           Dental  No notable dental hx       Pulmonary  Breath sounds clear to auscultation               Abdominal         Other Findings            Anesthetic Plan    ASA: 2  Anesthesia type: MAC          Induction: Intravenous  Anesthetic plan and risks discussed with: Patient

## 2022-12-05 NOTE — DISCHARGE INSTRUCTIONS
118 [facility].  [address]  [id number]  1940    DISCOMFORT:  Sore throat- throat lozenges or warm salt water gargle  redness at IV site- apply warm compress to area; if redness or soreness persist- contact your physician  Gaseous discomfort- walking, belching will help relieve any discomfort    DIET  You may eat and drink after you leave. You may resume your regular diet - however -  remember your colon is empty and a heavy meal will produce gas. Avoid these foods:  vegetables, fried / greasy foods, carbonated drinks   You may not drink alcoholic beverages for at least 12 hours    ACTIVITY  You may resume your normal daily activities   Spend the remainder of the day resting -  avoid any strenuous activity. You may not operate a vehicle for 12 hours  You may not engage in an occupation involving machinery or appliances for rest of today  Avoid making any critical decisions for at least 24 hour    CALL M.D. ANY SIGN OF   Increasing pain, nausea, vomiting  Abdominal distension (swelling)  New increased bleeding (oral or rectal)  Fever (chills)  Pain in chest area  Bloody discharge from nose or mouth  Shortness of breath    Follow-up Instructions:   Call Dr. Yinka Malloy for any questions or problems. If we took a biopsy please call the office within 2 weeks to discuss your pathology results. Telephone # [phone]       ENDOSCOPY FINDINGS:   Esophagitis  Hiatal hernia  Pancreatic cyst  Gallstone  Liver cyst       Post-procedure recommendations:   -Await pathology  -Start PPI (omeprazole 20 mg once daily before breakfast, E prescription sent to pharmacy)  -Follow up with Dr. Leyt Armijo  -Repeat MRI in 6 months to assess for stability of the pancreatic cyst    Learning About Coronavirus (COVID-19)  Coronavirus (COVID-19): Overview  What is coronavirus (COVID-19)?   The coronavirus disease (COVID-19) is caused by a virus. It is an illness that was first found in [place], [place], in December 2019. It has since spread worldwide. The virus can cause fever, cough, and trouble breathing. In severe cases, it can cause pneumonia and make it hard to breathe without help. It can cause death. Coronaviruses are a large group of viruses. They cause the common cold. They also cause more serious illnesses like Middle East respiratory syndrome (MERS) and severe acute respiratory syndrome (SARS). COVID-19 is caused by a novel coronavirus. That means it's a new type that has not been seen in people before. This virus spreads person-to-person through droplets from coughing and sneezing. It can also spread when you are close to someone who is infected. And it can spread when you touch something that has the virus on it, such as a doorknob or a tabletop. What can you do to protect yourself from coronavirus (COVID-19)? The best way to protect yourself from getting sick is to: Avoid areas where there is an outbreak. Avoid contact with people who may be infected. Wash your hands often with soap or alcohol-based hand sanitizers. Avoid crowds and try to stay at least 6 feet away from other people. Wash your hands often, especially after you cough or sneeze. Use soap and water, and scrub for at least 20 seconds. If soap and water aren't available, use an alcohol-based hand . Avoid touching your mouth, nose, and eyes. What can you do to avoid spreading the virus to others? To help avoid spreading the virus to others:  Cover your mouth with a tissue when you cough or sneeze. Then throw the tissue in the trash. Use a disinfectant to clean things that you touch often. Stay home if you are sick or have been exposed to the virus. Don't go to school, work, or public areas. And don't use public transportation. If you are sick:  Leave your home only if you need to get medical care.  But call the doctor's office first so they know you're coming. And wear a face mask, if you have one. If you have a face mask, wear it whenever you're around other people. It can help stop the spread of the virus when you cough or sneeze. Clean and disinfect your home every day. Use household  and disinfectant wipes or sprays. Take special care to clean things that you grab with your hands. These include doorknobs, remote controls, phones, and handles on your refrigerator and microwave. And don't forget countertops, tabletops, bathrooms, and computer keyboards. When to call for help  Call 911 anytime you think you may need emergency care. For example, call if:  You have severe trouble breathing. (You can't talk at all.)  You have constant chest pain or pressure. You are severely dizzy or lightheaded. You are confused or can't think clearly. Your face and lips have a blue color. You pass out (lose consciousness) or are very hard to wake up. Call your doctor now if you develop symptoms such as:  Shortness of breath. Fever. Cough. If you need to get care, call ahead to the doctor's office for instructions before you go. Make sure you wear a face mask, if you have one, to prevent exposing other people to the virus. Where can you get the latest information? The following health organizations are tracking and studying this virus. Their websites contain the most up-to-date information. Marlene Vasquez also learn what to do if you think you may have been exposed to the virus. U.S. Centers for Disease Control and Prevention (CDC): The CDC provides updated news about the disease and travel advice. The website also tells you how to prevent the spread of infection. www.cdc.gov  World Health Organization [facility]): WHO offers information about the virus outbreaks. WHO also has travel advice. www.who.int  Current as of: April 1, 2020               Content Version: 12.4  © 2006-2020 Healthwise, Incorporated.    Care instructions adapted under license by your healthcare professional. If you have questions about a medical condition or this instruction, always ask your healthcare professional. [place] [address] any warranty or liability for your use of this information.
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
## 2022-12-05 NOTE — ANESTHESIA POSTPROCEDURE EVALUATION
Post-Anesthesia Evaluation and Assessment    Patient: Etta Fan MRN: [id number]  SSN: [id number]    YOB: 1940  Age: [de-identified] y.o. Sex: male      I have evaluated the patient and they are stable and ready for discharge from the PACU. Cardiovascular Function/Vital Signs  Visit Vitals  BP (!) 144/81   Pulse 62   Temp 36.6 °C (97.8 °F)   Resp 14   Ht 5' 10\" (1.778 m)   Wt 72.6 kg (160 lb)   SpO2 98%   BMI 22.96 kg/m²       Patient is status post MAC anesthesia for Procedure(s):  LINEAR ENDOSCOPIC ULTRASOUND (EUS)  ESOPHAGOGASTRODUODENOSCOPY (EGD)  ESOPHAGOGASTRODUODENAL (EGD) BIOPSY  RESOLUTION CLIP. Nausea/Vomiting: None    Postoperative hydration reviewed and adequate. Pain:  Pain Scale 1: Numeric (0 - 10) (12/05/22 1543)  Pain Intensity 1: 0 (12/05/22 1543)   Managed    Neurological Status: At baseline    Mental Status, Level of Consciousness: Alert and  oriented to person, place, and time    Pulmonary Status:   O2 Device: None (Room air) (12/05/22 1540)   Adequate oxygenation and airway patent    Complications related to anesthesia: None    Post-anesthesia assessment completed. No concerns    Signed By: James Arvizu MD     December 5, 2022              Procedure(s):  LINEAR ENDOSCOPIC ULTRASOUND (EUS)  ESOPHAGOGASTRODUODENOSCOPY (EGD)  ESOPHAGOGASTRODUODENAL (EGD) BIOPSY  RESOLUTION CLIP. MAC    <BSHSIANPOST>    INITIAL Post-op Vital signs:   Vitals Value Taken Time   /81 12/05/22 1600   Temp 36.6 °C (97.8 °F) 12/05/22 1543   Pulse 66 12/05/22 1602   Resp 14 12/05/22 1602   SpO2 97 % 12/05/22 1555   Vitals shown include unvalidated device data.

## 2022-12-05 NOTE — PROGRESS NOTES

## 2023-02-17 ENCOUNTER — TRANSCRIBE ORDER (OUTPATIENT)
Dept: SCHEDULING | Age: 83
End: 2023-02-17

## 2023-02-17 DIAGNOSIS — K21.9 GASTROESOPHAGEAL REFLUX DISEASE: ICD-10-CM

## 2023-02-17 DIAGNOSIS — K86.2 CYST OF PANCREAS: Primary | ICD-10-CM

## 2023-04-22 ENCOUNTER — TRANSCRIBE ORDERS (OUTPATIENT)
Facility: HOSPITAL | Age: 83
End: 2023-04-22

## 2023-04-22 DIAGNOSIS — K21.9 GASTROESOPHAGEAL REFLUX DISEASE WITHOUT ESOPHAGITIS: ICD-10-CM

## 2023-04-22 DIAGNOSIS — K86.2 CYST AND PSEUDOCYST OF PANCREAS: Primary | ICD-10-CM

## 2023-04-22 DIAGNOSIS — K86.3 CYST AND PSEUDOCYST OF PANCREAS: Primary | ICD-10-CM

## 2023-05-10 ENCOUNTER — HOSPITAL ENCOUNTER (OUTPATIENT)
Facility: HOSPITAL | Age: 83
End: 2023-05-10
Payer: MEDICARE

## 2023-05-10 DIAGNOSIS — K86.3 CYST AND PSEUDOCYST OF PANCREAS: ICD-10-CM

## 2023-05-10 DIAGNOSIS — K21.9 GASTROESOPHAGEAL REFLUX DISEASE WITHOUT ESOPHAGITIS: ICD-10-CM

## 2023-05-10 DIAGNOSIS — K86.2 CYST AND PSEUDOCYST OF PANCREAS: ICD-10-CM

## 2023-05-10 PROCEDURE — 74183 MRI ABD W/O CNTR FLWD CNTR: CPT

## 2023-05-10 PROCEDURE — A9579 GAD-BASE MR CONTRAST NOS,1ML: HCPCS

## 2023-05-10 PROCEDURE — 2580000003 HC RX 258: Performed by: SPECIALIST

## 2023-05-10 PROCEDURE — 6360000004 HC RX CONTRAST MEDICATION

## 2023-05-10 RX ORDER — 0.9 % SODIUM CHLORIDE 0.9 %
100 INTRAVENOUS SOLUTION INTRAVENOUS ONCE
Status: COMPLETED | OUTPATIENT
Start: 2023-05-10 | End: 2023-05-10

## 2023-05-10 RX ORDER — SODIUM CHLORIDE 0.9 % (FLUSH) 0.9 %
10 SYRINGE (ML) INJECTION ONCE
Status: COMPLETED | OUTPATIENT
Start: 2023-05-10 | End: 2023-05-10

## 2023-05-10 RX ADMIN — GADOTERIDOL 15 ML: 279.3 INJECTION, SOLUTION INTRAVENOUS at 11:30

## 2023-05-10 RX ADMIN — SODIUM CHLORIDE, PRESERVATIVE FREE 10 ML: 5 INJECTION INTRAVENOUS at 11:32

## 2023-05-10 RX ADMIN — SODIUM CHLORIDE 100 ML: 900 INJECTION, SOLUTION INTRAVENOUS at 11:32

## 2023-05-19 NOTE — MR AVS SNAPSHOT
Visit Information Date & Time Provider Department Dept. Phone Encounter #  
 1/5/2018  9:30 AM Kayley Paniagua MD [facility] [phone] [id number] Follow-up Instructions Return if symptoms worsen or fail to improve. Your Appointments 1/5/2018  9:30 AM  
SAME DAY with MD Loni Demarco [address]) Appt Note: Not feeling well/Tingling in fingers/High BP/$0CP KMP 01/05/18  
 [address] [address] [phone]  
  
   
 [address] 5/31/2018  9:00 AM  
ROUTINE CARE with Kayley Paniagua MD  
[address]) Appt Note: 6mth f/u;  bp, chol, ifg, asthma  
 [address] [facility] [address] [phone]  
  
   
 [address] Upcoming Health Maintenance Date Due  
 GLAUCOMA SCREENING Q2Y 1/31/2018* MEDICARE YEARLY EXAM 5/31/2018 COLONOSCOPY 2/18/2021 DTaP/Tdap/Td series (2 - Td) 5/25/2026 *Topic was postponed. The date shown is not the original due date. Allergies as of 1/5/2018  Review Complete On: 1/5/2018 By: Kayley Paniagua MD  
  
 Severity Noted Reaction Type Reactions Melon Flavor  07/29/2010    Swelling Facial swelling with watermelon and cantelope Current Immunizations  Reviewed on 5/30/2017 Name Date Influenza High Dose Vaccine PF 11/30/2017, 11/28/2016, 11/23/2015, 11/10/2014 Influenza Vaccine 11/8/2013 Influenza Vaccine Split 11/9/2012  9:13 AM, 10/7/2011 Influenza Vaccine Whole 10/13/2010 Pneumococcal Conjugate (PCV-13) 3/9/2015 Pneumococcal Vaccine (Unspecified Type) 3/9/2015 TD Vaccine 1/1/1999 ZZZ-RETIRED (DO NOT USE) Pneumococcal Vaccine (Unspecified Type) 1/1/2002, 1/1/1999 Not reviewed this visit You Were Diagnosed With   
  
 Codes Comments Acute cervical radiculopathy    -  Primary ICD-10-CM: M54.12 
ICD-9-CM: 723.4 Vitals BP Pulse Temp Resp Height(growth percentile) Weight(growth percentile) 130/70 90 97.8 °F (36.6 °C) (Oral) 14 5' 11\" (1.803 m) 180 lb 12.8 oz (82 kg) SpO2 BMI Smoking Status 97% 25.22 kg/m2 Former Smoker Vitals History BMI and BSA Data Body Mass Index Body Surface Area  
 25.22 kg/m 2 2.03 m 2 Preferred Pharmacy Pharmacy Name Phone [facility] [address], [address] [phone] Your Updated Medication List  
  
   
This list is accurate as of: 1/5/18  9:25 AM.  Always use your most recent med list.  
  
  
  
  
 albuterol 90 mcg/actuation inhaler Commonly known as:  PROVENTIL HFA, VENTOLIN HFA, PROAIR HFA Take 1 Puff by inhalation every four (4) hours as needed for Wheezing. ASMANEX TWISTHALER 220 mcg (60 doses) inhaler Generic drug:  mometasone Inhale 2 puffs by mouth  every day  
  
 finasteride 5 mg tablet Commonly known as:  PROSCAR Take 5 mg by mouth daily. methylPREDNISolone 4 mg tablet Commonly known as:  MEDROL DOSEPACK  
use as directed MULTIVITAMIN PO Take  by mouth daily. omeprazole 40 mg capsule Commonly known as:  PRILOSEC Take 40 mg by mouth daily. psyllium Powd Commonly known as:  METAMUCIL Take  by mouth daily. tamsulosin 0.4 mg capsule Commonly known as:  FLOMAX Take 0.4 mg by mouth nightly. VITAMIN D3 1,000 unit tablet Generic drug:  cholecalciferol Take  by mouth daily. ZyrTEC 10 mg Cap Generic drug:  Cetirizine Take 10 mg by mouth daily. Prescriptions Sent to Pharmacy Refills  
 methylPREDNISolone (MEDROL DOSEPACK) 4 mg tablet 0 Sig: use as directed Class: Normal  
 [facility]acy: [facility], [address] Ph #: [phone] Follow-up Instructions Return if symptoms worsen or fail to improve. Patient Instructions Pinched Nerve in the Neck: Care Instructions Your Care Instructions A pinched nerve in the neck happens when a vertebra or disc in the upper part of your spine is damaged. This damage can happen because of an injury. Or it can just happen with age. The changes caused by the damage may put pressure on a nearby nerve root, pinching it. This causes symptoms such as sharp pain in your neck, shoulder, arm, hand, or back. You may also have tingling or numbness. Sometimes it makes your arm weaker. The symptoms are usually worse when you turn your head or strain your neck. For many people, the symptoms get better over time and finally go away. Early treatment usually includes medicines for pain and swelling. Sometimes physical therapy and special exercises may help. Follow-up care is a key part of your treatment and safety. Be sure to make and go to all appointments, and call your doctor if you are having problems. It's also a good idea to know your test results and keep a list of the medicines you take. How can you care for yourself at home? · Be safe with medicines. Read and follow all instructions on the label. ¨ If the doctor gave you a prescription medicine for pain, take it as prescribed. ¨ If you are not taking a prescription pain medicine, ask your doctor if you can take an over-the-counter medicine. · Try using a heating pad on a low or medium setting for 15 to 20 minutes every 2 or 3 hours. Try a warm shower in place of one session with the heating pad. You can also buy single-use heat wraps that last up to 8 hours. · You can also try an ice pack for 10 to 15 minutes every 2 to 3 hours. There isn't strong evidence that either heat or ice will help. But you can try them to see if they help you. · Don't spend too long in one position. Take short breaks to move around and change positions. · Wear a seat belt and shoulder harness when you are in a car. · Sleep with a pillow under your head and neck that keeps your neck straight. · If you were given a neck brace (cervical collar) to limit neck motion, wear it as instructed for as many days as your doctor tells you to. Do not wear it longer than you were told to. Wearing a brace for too long can lead to neck stiffness and can weaken the neck muscles. · Follow your doctor's instructions for gentle neck-stretching exercises. · Do not smoke. Smoking can slow healing of your discs. If you need help quitting, talk to your doctor about stop-smoking programs and medicines. These can increase your chances of quitting for good. · Avoid strenuous work or exercise until your doctor says it is okay. When should you call for help? Call 911 anytime you think you may need emergency care. For example, call if: 
? · You are unable to move an arm or a leg at all. ?Call your doctor now or seek immediate medical care if: 
? · You have new or worse symptoms in your arms, legs, chest, belly, or buttocks. Symptoms may include: ¨ Numbness or tingling. ¨ Weakness. ¨ Pain. ? · You lose bladder or bowel control. ? Watch closely for changes in your health, and be sure to contact your doctor if: 
? · You are not getting better as expected. Where can you learn more? Go to http://sajan-jos.info/. Enter G373 in the search box to learn more about \"Pinched Nerve in the Neck: Care Instructions. \" Current as of: March 21, 2017 Content Version: 11.4 © 6763-1603 [employer]. Care instructions adapted under license by [employer] (which disclaims liability or warranty for this information). If you have questions about a medical condition or this instruction, always ask your healthcare professional. [address] any warranty or liability for your use of this information. Introducing \[facility] [place] [facility]\"" & HEALTH SERVICES! Eleni Sheriff introduces [employer] patient portal. Now you can access parts of your medical record, email your doctor's office, and request medication refills online. 1. In your internet browser, go to [email or web address] 2. Click on the First Time User? Click Here link in the Sign In box. You will see the New Member Sign Up page. 3. Enter your [employer] Access Code exactly as it appears below. You will not need to use this code after youve completed the sign-up process. If you do not sign up before the expiration date, you must request a new code. · [employer] Access Code: [email or web address] Expires: 2/28/2018  8:50 AM 
 
4. Enter the last four digits of your Social Security Number (xxxx) and Date of Birth (mm/dd/yyyy) as indicated and click Submit. You will be taken to the next sign-up page. 5. Create a [employer] ID. This will be your [employer] login ID and cannot be changed, so think of one that is secure and easy to remember. 6. Create a [employer] password. You can change your password at any time. 7. Enter your Password Reset Question and Answer. This can be used at a later time if you forget your password. 8. Enter your e-mail address. You will receive e-mail notification when new information is available in [address] 9. Click Sign Up. You can now view and download portions of your medical record. 10. Click the Download Summary menu link to download a portable copy of your medical information. If you have questions, please visit the Frequently Asked Questions section of the [employer] website. Remember, [employer] is NOT to be used for urgent needs. For medical emergencies, dial 911. Now available from your iPhone and Android! Please provide this summary of care documentation to your next provider. Your primary care clinician is listed as Felice Manning. If you have any questions after today's visit, please call [phone]. DISPLAY PLAN FREE TEXT

## 2024-06-07 ENCOUNTER — HOSPITAL ENCOUNTER (OUTPATIENT)
Facility: HOSPITAL | Age: 84
End: 2024-06-07
Attending: SPECIALIST
Payer: MEDICARE

## 2024-06-07 DIAGNOSIS — K21.9 GASTROESOPHAGEAL REFLUX DISEASE, UNSPECIFIED WHETHER ESOPHAGITIS PRESENT: ICD-10-CM

## 2024-06-07 DIAGNOSIS — K86.2 PANCREATIC CYST: ICD-10-CM

## 2024-06-07 PROCEDURE — 2580000003 HC RX 258: Performed by: SPECIALIST

## 2024-06-07 PROCEDURE — 74183 MRI ABD W/O CNTR FLWD CNTR: CPT

## 2024-06-07 PROCEDURE — A9579 GAD-BASE MR CONTRAST NOS,1ML: HCPCS | Performed by: SPECIALIST

## 2024-06-07 PROCEDURE — 6360000004 HC RX CONTRAST MEDICATION: Performed by: SPECIALIST

## 2024-06-07 RX ORDER — 0.9 % SODIUM CHLORIDE 0.9 %
500 INTRAVENOUS SOLUTION INTRAVENOUS ONCE
OUTPATIENT
Start: 2024-06-07 | End: 2024-06-07

## 2024-06-07 RX ORDER — SODIUM CHLORIDE 0.9 % (FLUSH) 0.9 %
5-40 SYRINGE (ML) INJECTION 2 TIMES DAILY
Status: DISCONTINUED | OUTPATIENT
Start: 2024-06-07 | End: 2024-06-11 | Stop reason: HOSPADM

## 2024-06-07 RX ORDER — 0.9 % SODIUM CHLORIDE 0.9 %
100 INTRAVENOUS SOLUTION INTRAVENOUS ONCE
Status: COMPLETED | OUTPATIENT
Start: 2024-06-07 | End: 2024-06-07

## 2024-06-07 RX ORDER — 0.9 % SODIUM CHLORIDE 0.9 %
100 INTRAVENOUS SOLUTION INTRAVENOUS ONCE
Status: CANCELLED | OUTPATIENT
Start: 2024-06-07 | End: 2024-06-07

## 2024-06-07 RX ADMIN — SODIUM CHLORIDE 100 ML: 900 INJECTION, SOLUTION INTRAVENOUS at 19:59

## 2024-06-07 RX ADMIN — SODIUM CHLORIDE, PRESERVATIVE FREE 10 ML: 5 INJECTION INTRAVENOUS at 19:57

## 2024-06-07 RX ADMIN — GADOTERIDOL 14 ML: 279.3 INJECTION, SOLUTION INTRAVENOUS at 19:56

## 2024-06-10 NOTE — PROGRESS NOTES
HPI  Mr. Yakelin Green is a 70y.o. year old male, he is seen today for possible bladder infection. Has had frequency and urgency for 3-4 days. No blood, no n/v/abd pain, no f/c. No new back pain. Sees urologist once per year - appt coming up soon. Treated for UTI in 10/2018 and symptoms resolved. Treated with keflex. Chief Complaint   Patient presents with    Bladder Infection     Room 2B// frequency and urgency x 3-4 days- denies burning         Prior to Admission medications    Medication Sig Start Date End Date Taking? Authorizing Provider   nitrofurantoin, macrocrystal-monohydrate, (MACROBID) 100 mg capsule Take 1 Cap by mouth two (2) times a day for 7 days. 2/5/20 2/12/20 Yes Rome Angelucci, MD   pantoprazole (PROTONIX) 20 mg tablet Take 1 Tab by mouth daily. 11/18/19  Yes Rome Angelucci, MD   tamsulosin (FLOMAX) 0.4 mg capsule Take 1 Cap by mouth nightly. 4/1/19  Yes Christiana Voing, ALBERT   hydroCHLOROthiazide (HYDRODIURIL) 12.5 mg tablet take 1 tablet by mouth once daily 2/11/19  Yes Christiana Voing, ALBERT   sertraline (ZOLOFT) 50 mg tablet Take  by mouth daily. Yes Provider, Historical   albuterol (PROVENTIL HFA, VENTOLIN HFA, PROAIR HFA) 90 mcg/actuation inhaler Take 1 Puff by inhalation every four (4) hours as needed for Wheezing. 11/30/17  Yes Rome Angelucci, MD   finasteride (PROSCAR) 5 mg tablet Take 5 mg by mouth daily. Yes Provider, Historical   cholecalciferol (VITAMIN D3) 1,000 unit tablet Take  by mouth daily. Yes Provider, Historical   psyllium (METAMUCIL) powd Take  by mouth daily. Yes Provider, Historical   ASMANEX TWISTHALER 220 mcg (60 doses) inhaler Inhale 2 puffs by mouth  every day  Patient taking differently: Inhale 2 puffs by mouth  every day prn 10/19/15  Yes Rome Angelucci, MD   Cetirizine (ZYRTEC) 10 mg cap Take 10 mg by mouth daily. Yes Provider, Historical   MULTIVITAMINS (MULTIVITAMIN PO) Take  by mouth daily.  7/22/10  Yes Provider, Historical Allergies   Allergen Reactions    Melon Flavor Swelling     Facial swelling with watermelon and cantelope         REVIEW OF SYSTEMS:  Per HPI    PHYSICAL EXAM:  Visit Vitals  /90   Pulse 78   Temp 98.1 °F (36.7 °C) (Oral)   Resp 14   Ht 5' 11\" (1.803 m)   Wt 184 lb 8 oz (83.7 kg)   SpO2 95%   BMI 25.73 kg/m²     Constitutional: Appears well-developed and well-nourished. No distress. HENT:   Head: Normocephalic and atraumatic. Eyes: No scleral icterus. Cardiovascular: Normal S1/S2, regular rhythm. No murmurs, rubs, or gallops. Pulmonary/Chest: Effort normal and breath sounds normal. No respiratory distress. No wheezes, rhonchi, or rales. Abdomen: Soft, NT/ND, +BS, no rebound or guarding, no masses, no HSM appreciated. Back: no cva tenderness  Ext: No edema. Neurological: Alert. Psychiatric: Normal mood and affect. Behavior is normal.     Lab Results   Component Value Date/Time    Sodium 143 11/20/2019 08:26 AM    Potassium 4.0 11/20/2019 08:26 AM    Chloride 101 11/20/2019 08:26 AM    CO2 27 11/20/2019 08:26 AM    Anion gap 8 10/24/2015 09:05 PM    Glucose 92 11/20/2019 08:26 AM    BUN 11 11/20/2019 08:26 AM    Creatinine 0.89 11/20/2019 08:26 AM    BUN/Creatinine ratio 12 11/20/2019 08:26 AM    GFR est AA 95 11/20/2019 08:26 AM    GFR est non-AA 82 11/20/2019 08:26 AM    Calcium 9.2 11/20/2019 08:26 AM    Bilirubin, total 0.5 11/20/2019 08:26 AM    AST (SGOT) 25 11/20/2019 08:26 AM    Alk.  phosphatase 52 11/20/2019 08:26 AM    Protein, total 6.3 11/20/2019 08:26 AM    Albumin 4.5 11/20/2019 08:26 AM    Globulin 2.6 10/24/2015 09:05 PM    A-G Ratio 2.5 (H) 11/20/2019 08:26 AM    ALT (SGPT) 34 11/20/2019 08:26 AM     Lab Results   Component Value Date/Time    Hemoglobin A1c 5.9 (H) 11/20/2019 08:26 AM    Hemoglobin A1c 5.8 (H) 05/14/2019 09:40 AM    Hemoglobin A1c 5.8 (H) 11/14/2018 09:03 AM      Lab Results   Component Value Date/Time    Cholesterol, total 181 11/20/2019 08:26 AM    HDL 37.1 Cholesterol 54 11/20/2019 08:26 AM    LDL, calculated 108 (H) 11/20/2019 08:26 AM    VLDL, calculated 19 11/20/2019 08:26 AM    Triglyceride 94 11/20/2019 08:26 AM    CHOL/HDL Ratio 3.5 07/29/2010 09:06 AM          ASSESSMENT/PLAN  Diagnoses and all orders for this visit:    1. Acute cystitis without hematuria  -     nitrofurantoin, macrocrystal-monohydrate, (MACROBID) 100 mg capsule; Take 1 Cap by mouth two (2) times a day for 7 days. Appears to have infection- treat as above,send cx and patient will discuss symptoms and infections with urology at his appt  2. Urinary frequency  -     AMB POC URINALYSIS DIP STICK AUTO W/O MICRO    3. Abnormal urinalysis  -     CULTURE, URINE          Health Maintenance Due   Topic Date Due    GLAUCOMA SCREENING Q2Y  08/28/2017               Reviewed plan of care. Patient has provided input and agrees with goals. The nurse provided the patient and/or family with advanced directive information if needed and encouraged the patient to provide a copy to the office when available.

## 2024-12-05 ENCOUNTER — TRANSCRIBE ORDERS (OUTPATIENT)
Facility: HOSPITAL | Age: 84
End: 2024-12-05

## 2024-12-05 DIAGNOSIS — K86.2 PANCREATIC CYST: Primary | ICD-10-CM

## 2024-12-22 ENCOUNTER — HOSPITAL ENCOUNTER (OUTPATIENT)
Facility: HOSPITAL | Age: 84
Discharge: HOME | End: 2024-12-25
Attending: SPECIALIST
Payer: MEDICARE

## 2024-12-22 DIAGNOSIS — K86.2 PANCREATIC CYST: ICD-10-CM

## 2024-12-22 PROCEDURE — 74183 MRI ABD W/O CNTR FLWD CNTR: CPT

## 2024-12-22 PROCEDURE — 6360000004 HC RX CONTRAST MEDICATION: Performed by: SPECIALIST

## 2024-12-22 PROCEDURE — 2580000003 HC RX 258: Performed by: SPECIALIST

## 2024-12-22 PROCEDURE — 2500000003 HC RX 250 WO HCPCS: Performed by: SPECIALIST

## 2024-12-22 PROCEDURE — A9579 GAD-BASE MR CONTRAST NOS,1ML: HCPCS | Performed by: SPECIALIST

## 2024-12-22 RX ORDER — SODIUM CHLORIDE 0.9 % (FLUSH) 0.9 %
10 SYRINGE (ML) INJECTION ONCE
Status: COMPLETED | OUTPATIENT
Start: 2024-12-22 | End: 2024-12-22

## 2024-12-22 RX ORDER — 0.9 % SODIUM CHLORIDE 0.9 %
100 INTRAVENOUS SOLUTION INTRAVENOUS ONCE
Status: COMPLETED | OUTPATIENT
Start: 2024-12-22 | End: 2024-12-22

## 2024-12-22 RX ADMIN — SODIUM CHLORIDE 100 ML: 900 INJECTION, SOLUTION INTRAVENOUS at 09:34

## 2024-12-22 RX ADMIN — SODIUM CHLORIDE, PRESERVATIVE FREE 10 ML: 5 INJECTION INTRAVENOUS at 09:34

## 2024-12-22 RX ADMIN — GADOTERIDOL 15 ML: 279.3 INJECTION, SOLUTION INTRAVENOUS at 09:34

## (undated) DEVICE — TUBING HYDR IRR --

## (undated) DEVICE — FCPS RAD JAW 4LC 240CM W/NDL -- BX/40